# Patient Record
Sex: MALE | Race: WHITE | URBAN - METROPOLITAN AREA
[De-identification: names, ages, dates, MRNs, and addresses within clinical notes are randomized per-mention and may not be internally consistent; named-entity substitution may affect disease eponyms.]

---

## 2018-10-11 ENCOUNTER — EMERGENCY (EMERGENCY)
Facility: HOSPITAL | Age: 83
LOS: 0 days | Discharge: HOME | End: 2018-10-12
Attending: EMERGENCY MEDICINE | Admitting: EMERGENCY MEDICINE
Payer: MEDICARE

## 2018-10-11 VITALS
SYSTOLIC BLOOD PRESSURE: 168 MMHG | DIASTOLIC BLOOD PRESSURE: 69 MMHG | WEIGHT: 149.91 LBS | TEMPERATURE: 98 F | HEIGHT: 70 IN | RESPIRATION RATE: 16 BRPM | HEART RATE: 45 BPM | OXYGEN SATURATION: 96 %

## 2018-10-11 DIAGNOSIS — M41.9 SCOLIOSIS, UNSPECIFIED: ICD-10-CM

## 2018-10-11 DIAGNOSIS — R00.1 BRADYCARDIA, UNSPECIFIED: ICD-10-CM

## 2018-10-11 DIAGNOSIS — S39.82XA OTHER SPECIFIED INJURIES OF LOWER BACK, INITIAL ENCOUNTER: ICD-10-CM
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
## 2018-10-11 DIAGNOSIS — S32.059A UNSPECIFIED FRACTURE OF FIFTH LUMBAR VERTEBRA, INITIAL ENCOUNTER FOR CLOSED FRACTURE: ICD-10-CM

## 2018-10-11 DIAGNOSIS — R05 COUGH: ICD-10-CM
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
## 2018-10-11 LAB
ALBUMIN SERPL ELPH-MCNC: 3.6 G/DL — SIGNIFICANT CHANGE UP (ref 3.3–5)
ALP SERPL-CCNC: 120 U/L — SIGNIFICANT CHANGE UP (ref 40–120)
ALT FLD-CCNC: 32 U/L — SIGNIFICANT CHANGE UP (ref 12–78)
ANION GAP SERPL CALC-SCNC: 7 MMOL/L — SIGNIFICANT CHANGE UP (ref 5–17)
APTT BLD: 28.7 SEC — SIGNIFICANT CHANGE UP (ref 27.5–37.4)
AST SERPL-CCNC: 22 U/L — SIGNIFICANT CHANGE UP (ref 15–37)
BILIRUB SERPL-MCNC: 0.7 MG/DL — SIGNIFICANT CHANGE UP (ref 0.2–1.2)
BUN SERPL-MCNC: 24 MG/DL — HIGH (ref 7–23)
CALCIUM SERPL-MCNC: 9 MG/DL — SIGNIFICANT CHANGE UP (ref 8.5–10.1)
CHLORIDE SERPL-SCNC: 101 MMOL/L — SIGNIFICANT CHANGE UP (ref 96–108)
CO2 SERPL-SCNC: 28 MMOL/L — SIGNIFICANT CHANGE UP (ref 22–31)
CREAT SERPL-MCNC: 0.96 MG/DL — SIGNIFICANT CHANGE UP (ref 0.5–1.3)
GLUCOSE SERPL-MCNC: 89 MG/DL — SIGNIFICANT CHANGE UP (ref 70–99)
HCT VFR BLD CALC: 46.3 % — SIGNIFICANT CHANGE UP (ref 39–50)
HGB BLD-MCNC: 15.4 G/DL — SIGNIFICANT CHANGE UP (ref 13–17)
INR BLD: 0.96 RATIO — SIGNIFICANT CHANGE UP (ref 0.88–1.16)
MAGNESIUM SERPL-MCNC: 2 MG/DL — SIGNIFICANT CHANGE UP (ref 1.6–2.6)
MCHC RBC-ENTMCNC: 31.4 PG — SIGNIFICANT CHANGE UP (ref 27–34)
MCHC RBC-ENTMCNC: 33.3 GM/DL — SIGNIFICANT CHANGE UP (ref 32–36)
MCV RBC AUTO: 94.5 FL — SIGNIFICANT CHANGE UP (ref 80–100)
NRBC # BLD: 0 /100 WBCS — SIGNIFICANT CHANGE UP (ref 0–0)
PLATELET # BLD AUTO: 230 K/UL — SIGNIFICANT CHANGE UP (ref 150–400)
POTASSIUM SERPL-MCNC: 4.7 MMOL/L — SIGNIFICANT CHANGE UP (ref 3.5–5.3)
POTASSIUM SERPL-SCNC: 4.7 MMOL/L — SIGNIFICANT CHANGE UP (ref 3.5–5.3)
PROT SERPL-MCNC: 7.4 GM/DL — SIGNIFICANT CHANGE UP (ref 6–8.3)
PROTHROM AB SERPL-ACNC: 10.4 SEC — SIGNIFICANT CHANGE UP (ref 9.8–12.7)
RBC # BLD: 4.9 M/UL — SIGNIFICANT CHANGE UP (ref 4.2–5.8)
RBC # FLD: 13 % — SIGNIFICANT CHANGE UP (ref 10.3–14.5)
SODIUM SERPL-SCNC: 136 MMOL/L — SIGNIFICANT CHANGE UP (ref 135–145)
TROPONIN I SERPL-MCNC: <0.015 NG/ML — SIGNIFICANT CHANGE UP (ref 0.01–0.04)
WBC # BLD: 5.88 K/UL — SIGNIFICANT CHANGE UP (ref 3.8–10.5)
WBC # FLD AUTO: 5.88 K/UL — SIGNIFICANT CHANGE UP (ref 3.8–10.5)

## 2018-10-11 PROCEDURE — 72125 CT NECK SPINE W/O DYE: CPT | Mod: 26

## 2018-10-11 PROCEDURE — 70450 CT HEAD/BRAIN W/O DYE: CPT | Mod: 26

## 2018-10-11 PROCEDURE — 99285 EMERGENCY DEPT VISIT HI MDM: CPT | Mod: 25

## 2018-10-11 PROCEDURE — 71260 CT THORAX DX C+: CPT | Mod: 26

## 2018-10-11 PROCEDURE — 93010 ELECTROCARDIOGRAM REPORT: CPT

## 2018-10-11 PROCEDURE — 71045 X-RAY EXAM CHEST 1 VIEW: CPT | Mod: 26

## 2018-10-11 PROCEDURE — 74177 CT ABD & PELVIS W/CONTRAST: CPT | Mod: 26

## 2018-10-11 NOTE — ED PROVIDER NOTE - MEDICAL DECISION MAKING DETAILS
82 y/o male with a PMHx of scoliosis, central tremor, short term memory problems, chronic cough presents to the ED s/p unwitnessed mechanical fall today now c/o new right lower back pain that radiates to groin. Workup for traumatic injury.

## 2018-10-11 NOTE — ED PROVIDER NOTE - NS_ ATTENDINGSCRIBEDETAILS _ED_A_ED_FT
The scribe's documentation has been prepared under my direction and personally reviewed by me in its entirety.  I confirm that the note above accurately reflects all my work, treatment, procedures, and decision making except where otherwise noted or amended by me.  Francisco Kilgore M.D.

## 2018-10-11 NOTE — ED ADULT TRIAGE NOTE - CHIEF COMPLAINT QUOTE
Pt fell, c/o back pain, denies head injury. Pt also c/o palpitations earlier today, EMS states pt has bradycardia in 40s.

## 2018-10-11 NOTE — ED ADULT NURSE NOTE - NSIMPLEMENTINTERV_GEN_ALL_ED
Implemented All Fall Risk Interventions:  [place] to call system. Call bell, personal items and telephone within reach. Instruct patient to call for assistance. Room bathroom lighting operational. Non-slip footwear when patient is off stretcher. Physically safe environment: no spills, clutter or unnecessary equipment. Stretcher in lowest position, wheels locked, appropriate side rails in place. Provide visual cue, wrist band, yellow gown, etc. Monitor gait and stability. Monitor for mental status changes and reorient to person, place, and time. Review medications for side effects contributing to fall risk. Reinforce activity limits and safety measures with patient and family.

## 2018-10-11 NOTE — ED PROVIDER NOTE - OBJECTIVE STATEMENT
82 y/o male with a PMHx of scoliosis, central tremor, short term memory problems, chronic cough presents to the ED s/p unwitnessed mechanical fall today now c/o new right lower back pain that radiates to groin. Pt slipped backwards while standing up from EZ chair. Pt's wife called EMS because pt expressed back pain that was not in its usual spot. Denies head injury or LOC. Pt states that earlier today he was experiencing palpitations, upon arrival to ED EMS states that pt is bradycardic. Pt has been taking prednisone for back pain. MRI done yesterday. Denies CP, SOB. On beta-blockers. Home meds- Topamax, propanolol, Mysoline, Bacid, pravastatin, Flomax, Xalatan.

## 2019-07-30 NOTE — ED ADULT NURSE NOTE - CAS ELECT INFOMATION PROVIDED
Rasagiline (By mouth)   Rasagiline ([id number]-lino)  Treats Parkinson disease. This medicine is an MAOI. Brand Name(s): Azilect   There may be other brand names for this medicine. When This Medicine Should Not Be Used: This medicine is not right for everyone. Do not use it if you had an allergic reaction to rasagiline. How to Use This Medicine:   Tablet  · Your doctor will tell you how much medicine to use. Do not use more than directed. · Missed dose: Skip the missed dose and go back to your regular dosing schedule. Do not double doses. · Store the medicine in a closed container at room temperature, away from heat, moisture, and direct light. Drugs and Foods to Avoid:   Ask your doctor or pharmacist before using any other medicine, including over-the-counter medicines, vitamins, and herbal products. · You must avoid many other medicines while you are using rasagiline. These medicines used together could cause serious health problems, including death. Ask your doctor before you use any other medicine. You may need to wait 1 to 5 weeks before you can use the other medicine. · Do not use rasagiline if you have used an MAOI within the past 14 days. Do not use this medicine if you are using Garret's wort, cyclobenzaprine, dextromethorphan, meperidine, methadone, propoxyphene, or tramadol. · Some other medicines that can interact with rasagiline include ciprofloxacin, metoclopramide, cough and cold medicines (such as dextromethorphan), medicine to treat depression or mental health issues (such as a TCA, SSRI, or SSNRI). · Avoid foods and drinks that are high in tyramine, because your blood pressure could get dangerously high. Your doctor should give you a complete list. In general, do not eat anything aged or fermented, such as most cheese, most alcohol, cured meat (such as salami), sauerkraut, and soy sauce. Check the expiration dates on packages.  Tyramine levels get higher as food gets older or if it has not been refrigerated properly. · Tell your doctor if you use anything else that makes you sleepy. Some examples are allergy medicine, narcotic pain medicine, and alcohol. Warnings While Using This Medicine:   · Tell your doctor if you are pregnant or breastfeeding, or if you have kidney disease, liver disease, high or low blood pressure, muscle problems, or a history of mental illness. · This medicine may cause the following problems:  ¨ Serotonin syndrome (when used with antidepressant medicine)  ¨ Low or high blood pressure  ¨ Unusual changes in mood or behavior, compulsive behavior, hallucinations  ¨ Possible increased risk of skin cancer  · This medicine may make you dizzy or drowsy. It may even cause you to fall asleep without warning. Do not drive or do anything that could be dangerous until you know how this medicine affects you. Stand up slowly if you feel dizzy or lightheaded. · Do not stop using this medicine suddenly. Your doctor will need to slowly decrease your dose before you stop it completely. · Your doctor will check your progress and the effects of this medicine at regular visits. Keep all appointments. · Keep all medicine out of the reach of children. Never share your medicine with anyone.   Possible Side Effects While Using This Medicine:   Call your doctor right away if you notice any of these side effects:  · Allergic reaction: Itching or hives, swelling in your face or hands, swelling or tingling in your mouth or throat, chest tightness, trouble breathing  · Anxiety, restlessness, fast heartbeat, fever, sweating, muscle spasms, twitching, nausea, vomiting, diarrhea, seeing or hearing things that are not there  · Extreme sleepiness or drowsiness  · Confusion, unusual changes in mood or behavior, behaviors you cannot control  · Lightheadedness, dizziness, or fainting  · Skin changes or growths  · Twitching or muscle movements you cannot control, tremors, problems with balance or walking  If you notice these less serious side effects, talk with your doctor:   · Joint or muscle pain  · Mild nausea or vomiting, constipation, upset stomach  · Weight loss  If you notice other side effects that you think are caused by this medicine, tell your doctor. Call your doctor for medical advice about side effects. You may report side effects to FDA at 7-220-GEB-8584  © 2017 [facility] Information is for End User's use only and may not be sold, redistributed or otherwise used for commercial purposes. The above information is an  only. It is not intended as medical advice for individual conditions or treatments. Talk to your doctor, nurse or pharmacist before following any medical regimen to see if it is safe and effective for you. DC instructions

## 2021-01-01 NOTE — ED ADULT NURSE NOTE - OBJECTIVE STATEMENT
Patient presents to ED following fall. Patient states he fell backwards, denies hitting head and LOC. Patient states he has been experiencing back pain related to scoliosis. Patient states since fall he has increase in pain on lower right back. Patient was recently prescribed prednisone but MD DCed today after patient was experiencing palpitations. Patient states he has experienced increased weakness and fatigue with back pain. Patient denies SOB, CP, fever, chills, NVD. Patient had MRI yesterday. Patient A&0x3, wife at bedside. Blood Typing (ABO + Rho D + Direct Jenna), Cord Blood (11.17.21 @ 05:58)    Rh Interpretation: Positive    Direct Jenna IgG: Positive    ABO Interpretation: B

## 2021-08-22 ENCOUNTER — TRANSCRIPTION ENCOUNTER (OUTPATIENT)
Age: 86
End: 2021-08-22

## 2021-08-22 ENCOUNTER — INPATIENT (INPATIENT)
Facility: HOSPITAL | Age: 86
LOS: 4 days | Discharge: NURSING, REHAB OR LONG-TERM CARE | DRG: 480 | End: 2021-08-27
Attending: FAMILY MEDICINE | Admitting: STUDENT IN AN ORGANIZED HEALTH CARE EDUCATION/TRAINING PROGRAM
Payer: MEDICARE

## 2021-08-22 VITALS
OXYGEN SATURATION: 91 % | SYSTOLIC BLOOD PRESSURE: 159 MMHG | TEMPERATURE: 99 F | RESPIRATION RATE: 18 BRPM | HEART RATE: 103 BPM | WEIGHT: 164.91 LBS | HEIGHT: 70 IN | DIASTOLIC BLOOD PRESSURE: 69 MMHG
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
## 2021-08-22 DIAGNOSIS — S72.002A FRACTURE OF UNSPECIFIED PART OF NECK OF LEFT FEMUR, INITIAL ENCOUNTER FOR CLOSED FRACTURE: ICD-10-CM

## 2021-08-22 DIAGNOSIS — E78.5 HYPERLIPIDEMIA, UNSPECIFIED: ICD-10-CM

## 2021-08-22 DIAGNOSIS — J44.9 CHRONIC OBSTRUCTIVE PULMONARY DISEASE, UNSPECIFIED: ICD-10-CM

## 2021-08-22 DIAGNOSIS — S72.009A FRACTURE OF UNSPECIFIED PART OF NECK OF UNSPECIFIED FEMUR, INITIAL ENCOUNTER FOR CLOSED FRACTURE: ICD-10-CM

## 2021-08-22 DIAGNOSIS — F03.90 UNSPECIFIED DEMENTIA WITHOUT BEHAVIORAL DISTURBANCE: ICD-10-CM

## 2021-08-22 DIAGNOSIS — Z90.49 ACQUIRED ABSENCE OF OTHER SPECIFIED PARTS OF DIGESTIVE TRACT: Chronic | ICD-10-CM

## 2021-08-22 DIAGNOSIS — K76.89 OTHER SPECIFIED DISEASES OF LIVER: ICD-10-CM

## 2021-08-22 DIAGNOSIS — Z29.9 ENCOUNTER FOR PROPHYLACTIC MEASURES, UNSPECIFIED: ICD-10-CM

## 2021-08-22 DIAGNOSIS — G25.0 ESSENTIAL TREMOR: ICD-10-CM

## 2021-08-22 DIAGNOSIS — R00.1 BRADYCARDIA, UNSPECIFIED: ICD-10-CM

## 2021-08-22 LAB
ALBUMIN SERPL ELPH-MCNC: 2.9 G/DL — LOW (ref 3.3–5)
ALP SERPL-CCNC: 132 U/L — HIGH (ref 40–120)
ALT FLD-CCNC: 26 U/L — SIGNIFICANT CHANGE UP (ref 12–78)
ANION GAP SERPL CALC-SCNC: 3 MMOL/L — LOW (ref 5–17)
APPEARANCE UR: ABNORMAL
APTT BLD: 31.7 SEC — SIGNIFICANT CHANGE UP (ref 27.5–35.5)
AST SERPL-CCNC: 18 U/L — SIGNIFICANT CHANGE UP (ref 15–37)
BACTERIA # UR AUTO: ABNORMAL
BASOPHILS # BLD AUTO: 0.03 K/UL — SIGNIFICANT CHANGE UP (ref 0–0.2)
BASOPHILS NFR BLD AUTO: 0.4 % — SIGNIFICANT CHANGE UP (ref 0–2)
BILIRUB SERPL-MCNC: 0.5 MG/DL — SIGNIFICANT CHANGE UP (ref 0.2–1.2)
BILIRUB UR-MCNC: NEGATIVE — SIGNIFICANT CHANGE UP
BLD GP AB SCN SERPL QL: SIGNIFICANT CHANGE UP
BUN SERPL-MCNC: 26 MG/DL — HIGH (ref 7–23)
CALCIUM SERPL-MCNC: 8.4 MG/DL — LOW (ref 8.5–10.1)
CHLORIDE SERPL-SCNC: 110 MMOL/L — HIGH (ref 96–108)
CK SERPL-CCNC: 46 U/L — SIGNIFICANT CHANGE UP (ref 26–308)
CO2 SERPL-SCNC: 27 MMOL/L — SIGNIFICANT CHANGE UP (ref 22–31)
COLOR SPEC: YELLOW — SIGNIFICANT CHANGE UP
COMMENT - URINE: SIGNIFICANT CHANGE UP
COMMENT - URINE: SIGNIFICANT CHANGE UP
CREAT SERPL-MCNC: 0.85 MG/DL — SIGNIFICANT CHANGE UP (ref 0.5–1.3)
DIFF PNL FLD: ABNORMAL
EOSINOPHIL # BLD AUTO: 0.32 K/UL — SIGNIFICANT CHANGE UP (ref 0–0.5)
EOSINOPHIL NFR BLD AUTO: 4.7 % — SIGNIFICANT CHANGE UP (ref 0–6)
EPI CELLS # UR: SIGNIFICANT CHANGE UP
GLUCOSE SERPL-MCNC: 87 MG/DL — SIGNIFICANT CHANGE UP (ref 70–99)
GLUCOSE UR QL: NEGATIVE — SIGNIFICANT CHANGE UP
HCT VFR BLD CALC: 44.8 % — SIGNIFICANT CHANGE UP (ref 39–50)
HGB BLD-MCNC: 14.6 G/DL — SIGNIFICANT CHANGE UP (ref 13–17)
IMM GRANULOCYTES NFR BLD AUTO: 0.9 % — SIGNIFICANT CHANGE UP (ref 0–1.5)
INR BLD: 1.05 RATIO — SIGNIFICANT CHANGE UP (ref 0.88–1.16)
KETONES UR-MCNC: ABNORMAL
LEUKOCYTE ESTERASE UR-ACNC: ABNORMAL
LYMPHOCYTES # BLD AUTO: 1.22 K/UL — SIGNIFICANT CHANGE UP (ref 1–3.3)
LYMPHOCYTES # BLD AUTO: 18.1 % — SIGNIFICANT CHANGE UP (ref 13–44)
MCHC RBC-ENTMCNC: 30.2 PG — SIGNIFICANT CHANGE UP (ref 27–34)
MCHC RBC-ENTMCNC: 32.6 GM/DL — SIGNIFICANT CHANGE UP (ref 32–36)
MCV RBC AUTO: 92.8 FL — SIGNIFICANT CHANGE UP (ref 80–100)
MONOCYTES # BLD AUTO: 0.93 K/UL — HIGH (ref 0–0.9)
MONOCYTES NFR BLD AUTO: 13.8 % — SIGNIFICANT CHANGE UP (ref 2–14)
NEUTROPHILS # BLD AUTO: 4.19 K/UL — SIGNIFICANT CHANGE UP (ref 1.8–7.4)
NEUTROPHILS NFR BLD AUTO: 62.1 % — SIGNIFICANT CHANGE UP (ref 43–77)
NITRITE UR-MCNC: POSITIVE
NRBC # BLD: 0 /100 WBCS — SIGNIFICANT CHANGE UP (ref 0–0)
PH UR: 6.5 — SIGNIFICANT CHANGE UP (ref 5–8)
PLATELET # BLD AUTO: 233 K/UL — SIGNIFICANT CHANGE UP (ref 150–400)
POTASSIUM SERPL-MCNC: 4.7 MMOL/L — SIGNIFICANT CHANGE UP (ref 3.5–5.3)
POTASSIUM SERPL-SCNC: 4.7 MMOL/L — SIGNIFICANT CHANGE UP (ref 3.5–5.3)
PROT SERPL-MCNC: 7.2 G/DL — SIGNIFICANT CHANGE UP (ref 6–8.3)
PROT UR-MCNC: 30 MG/DL
PROTHROM AB SERPL-ACNC: 12.3 SEC — SIGNIFICANT CHANGE UP (ref 10.6–13.6)
RBC # BLD: 4.83 M/UL — SIGNIFICANT CHANGE UP (ref 4.2–5.8)
RBC # FLD: 13.2 % — SIGNIFICANT CHANGE UP (ref 10.3–14.5)
RBC CASTS # UR COMP ASSIST: ABNORMAL /HPF (ref 0–4)
SODIUM SERPL-SCNC: 140 MMOL/L — SIGNIFICANT CHANGE UP (ref 135–145)
SP GR SPEC: 1.01 — SIGNIFICANT CHANGE UP (ref 1.01–1.02)
UROBILINOGEN FLD QL: NEGATIVE — SIGNIFICANT CHANGE UP
WBC # BLD: 6.75 K/UL — SIGNIFICANT CHANGE UP (ref 3.8–10.5)
WBC # FLD AUTO: 6.75 K/UL — SIGNIFICANT CHANGE UP (ref 3.8–10.5)
WBC UR QL: ABNORMAL

## 2021-08-22 PROCEDURE — 70450 CT HEAD/BRAIN W/O DYE: CPT | Mod: 26,MH

## 2021-08-22 PROCEDURE — 73502 X-RAY EXAM HIP UNI 2-3 VIEWS: CPT | Mod: 26,LT

## 2021-08-22 PROCEDURE — 93010 ELECTROCARDIOGRAM REPORT: CPT

## 2021-08-22 PROCEDURE — 99223 1ST HOSP IP/OBS HIGH 75: CPT | Mod: GC

## 2021-08-22 PROCEDURE — 71045 X-RAY EXAM CHEST 1 VIEW: CPT | Mod: 26

## 2021-08-22 PROCEDURE — 71260 CT THORAX DX C+: CPT | Mod: 26,ME

## 2021-08-22 PROCEDURE — 99285 EMERGENCY DEPT VISIT HI MDM: CPT

## 2021-08-22 PROCEDURE — 72125 CT NECK SPINE W/O DYE: CPT | Mod: 26,MH

## 2021-08-22 PROCEDURE — G1004: CPT

## 2021-08-22 PROCEDURE — 73552 X-RAY EXAM OF FEMUR 2/>: CPT | Mod: 26,LT

## 2021-08-22 PROCEDURE — 74177 CT ABD & PELVIS W/CONTRAST: CPT | Mod: 26,ME

## 2021-08-22 RX ORDER — CEFTRIAXONE 500 MG/1
1000 INJECTION, POWDER, FOR SOLUTION INTRAMUSCULAR; INTRAVENOUS ONCE
Refills: 0 | Status: COMPLETED | OUTPATIENT
Start: 2021-08-22 | End: 2021-08-22

## 2021-08-22 RX ORDER — ONDANSETRON 8 MG/1
4 TABLET, FILM COATED ORAL ONCE
Refills: 0 | Status: COMPLETED | OUTPATIENT
Start: 2021-08-22 | End: 2021-08-22

## 2021-08-22 RX ORDER — MORPHINE SULFATE 50 MG/1
2 CAPSULE, EXTENDED RELEASE ORAL ONCE
Refills: 0 | Status: DISCONTINUED | OUTPATIENT
Start: 2021-08-22 | End: 2021-08-22

## 2021-08-22 RX ORDER — LATANOPROST 0.05 MG/ML
1 SOLUTION/ DROPS OPHTHALMIC; TOPICAL AT BEDTIME
Refills: 0 | Status: DISCONTINUED | OUTPATIENT
Start: 2021-08-22 | End: 2021-08-23

## 2021-08-22 RX ORDER — CEFTRIAXONE 500 MG/1
1000 INJECTION, POWDER, FOR SOLUTION INTRAMUSCULAR; INTRAVENOUS EVERY 24 HOURS
Refills: 0 | Status: DISCONTINUED | OUTPATIENT
Start: 2021-08-23 | End: 2021-08-23

## 2021-08-22 RX ORDER — DONEPEZIL HYDROCHLORIDE 10 MG/1
5 TABLET, FILM COATED ORAL AT BEDTIME
Refills: 0 | Status: DISCONTINUED | OUTPATIENT
Start: 2021-08-22 | End: 2021-08-23

## 2021-08-22 RX ORDER — LACTOBACILLUS ACIDOPHILUS 100MM CELL
1 CAPSULE ORAL DAILY
Refills: 0 | Status: DISCONTINUED | OUTPATIENT
Start: 2021-08-22 | End: 2021-08-23

## 2021-08-22 RX ORDER — CEFTRIAXONE 500 MG/1
INJECTION, POWDER, FOR SOLUTION INTRAMUSCULAR; INTRAVENOUS
Refills: 0 | Status: DISCONTINUED | OUTPATIENT
Start: 2021-08-22 | End: 2021-08-23

## 2021-08-22 RX ORDER — TOPIRAMATE 25 MG
25 TABLET ORAL
Refills: 0 | Status: DISCONTINUED | OUTPATIENT
Start: 2021-08-22 | End: 2021-08-23

## 2021-08-22 RX ORDER — PRIMIDONE 250 MG/1
50 TABLET ORAL
Refills: 0 | Status: DISCONTINUED | OUTPATIENT
Start: 2021-08-22 | End: 2021-08-23

## 2021-08-22 RX ORDER — TIMOLOL 0.5 %
1 DROPS OPHTHALMIC (EYE) DAILY
Refills: 0 | Status: DISCONTINUED | OUTPATIENT
Start: 2021-08-22 | End: 2021-08-23

## 2021-08-22 RX ORDER — TAMSULOSIN HYDROCHLORIDE 0.4 MG/1
0.4 CAPSULE ORAL AT BEDTIME
Refills: 0 | Status: DISCONTINUED | OUTPATIENT
Start: 2021-08-22 | End: 2021-08-23

## 2021-08-22 RX ORDER — SODIUM CHLORIDE 9 MG/ML
1000 INJECTION INTRAMUSCULAR; INTRAVENOUS; SUBCUTANEOUS ONCE
Refills: 0 | Status: COMPLETED | OUTPATIENT
Start: 2021-08-22 | End: 2021-08-22

## 2021-08-22 RX ADMIN — ONDANSETRON 4 MILLIGRAM(S): 8 TABLET, FILM COATED ORAL at 19:55

## 2021-08-22 RX ADMIN — SODIUM CHLORIDE 1000 MILLILITER(S): 9 INJECTION INTRAMUSCULAR; INTRAVENOUS; SUBCUTANEOUS at 19:57

## 2021-08-22 RX ADMIN — SODIUM CHLORIDE 1000 MILLILITER(S): 9 INJECTION INTRAMUSCULAR; INTRAVENOUS; SUBCUTANEOUS at 22:02

## 2021-08-22 RX ADMIN — MORPHINE SULFATE 2 MILLIGRAM(S): 50 CAPSULE, EXTENDED RELEASE ORAL at 19:56

## 2021-08-22 RX ADMIN — MORPHINE SULFATE 2 MILLIGRAM(S): 50 CAPSULE, EXTENDED RELEASE ORAL at 22:00

## 2021-08-22 RX ADMIN — MORPHINE SULFATE 2 MILLIGRAM(S): 50 CAPSULE, EXTENDED RELEASE ORAL at 22:02

## 2021-08-22 NOTE — H&P ADULT - NSHPPHYSICALEXAM_GEN_ALL_CORE
Vital Signs Last 24 Hrs  T(C): 36.5 (22 Aug 2021 23:11), Max: 37.5 (22 Aug 2021 20:11)  T(F): 97.7 (22 Aug 2021 23:11), Max: 99.5 (22 Aug 2021 20:11)  HR: 48 (22 Aug 2021 23:11) (44 - 103)  BP: 169/73 (22 Aug 2021 23:11) (155/71 - 169/73)  BP(mean): --  RR: 18 (22 Aug 2021 23:11) (18 - 18)  SpO2: 94% (22 Aug 2021 23:11) (91% - 98%)    Physical Exam:  General: pleasant, in pain with movement   HEENT: Normocephallic Atraumatic, PERRLA, EOMI bl, moist mucous membranes   Neck: Supple, nontender, no cervical lymphadenopathy  Neurology: A&Ox2, confused in the setting of dementia, no focal neurological deficits: CNII-XII intact  Respiratory: Clear To Auscultation B/L, No Wheezes, rhonchi or rales  CV: Regular Rate and Rhythm, +S1/S2, no murmurs, rubs or gallops  Abdominal: Soft, Non-Tender, Non-Distended +Bowel Soundsx4  MSK: left hip and left leg TTP, decreased ROM in left hip flexion and extension, left ribs 6-9 TTP   Extremities: No Clubbing, cyanosis or edema, + peripheral pulses: cap refill <2 secs LE bilaterally  Skin: no visible eccymosis of left hip/leg/ribs T(C): 36.5 (22 Aug 2021 23:11), Max: 37.5 (22 Aug 2021 20:11)  T(F): 97.7 (22 Aug 2021 23:11), Max: 99.5 (22 Aug 2021 20:11)  HR: 48 (22 Aug 2021 23:11) (44 - 103)  BP: 169/73 (22 Aug 2021 23:11) (155/71 - 169/73)  RR: 18 (22 Aug 2021 23:11) (18 - 18)  SpO2: 94% (22 Aug 2021 23:11) (91% - 98%)    General: pleasant, in pain with movement   HEENT: Normocephalic Atraumatic, PERRLA, EOMI bl, moist mucous membranes   Neck: Supple, nontender, no cervical lymphadenopathy  Neurology: A&Ox2, confused in the setting of dementia, no focal neurological deficits: CNII-XII intact  Respiratory: coarse, decreased breath sounds bilaterally  CV: Regular Rate and Rhythm, +S1/S2, no murmurs, rubs or gallops  Abdominal: Soft, Non-Tender, Non-Distended +Bowel Soundsx4  MSK: left hip and left leg TTP, decreased ROM in left hip flexion and extension, left ribs 6-9 TTP   Extremities: No Clubbing, cyanosis or edema, + peripheral pulses: cap refill <2 secs LE bilaterally  Skin: no visible ecchymosis of left hip/leg/ribs

## 2021-08-22 NOTE — H&P ADULT - ATTENDING COMMENTS
87 y/o M PMHx of dementia, essential tremor, COPD not on home O2, severe scoliosis, HLD, hx of partial colon resection, admit for comminuted intertrochanteric fracture of the proximal left femur s/p suspected mechanical fall.    admit to medicine  plan for operative fixation in AM w/ Dr. Henriquez  on rocephin for UTI, follow up culture data  cardio Dr. Mathis called for cardiac optimization; f/u 2D ECHO  pulm Dr. Bolton called for hx of COPD and likely chronic abnormal lung sounds  CT with mucoid impaction w/ likely chronic aspiration - coarse BS on exam  No absolute medical contraindication to proceeding w/ surgery with routine monitoring.  DR. GARCIA to assume care 8/23/21    Agree with H&P as outlined above, edited where appropriate.

## 2021-08-22 NOTE — H&P ADULT - NSICDXPASTMEDICALHX_GEN_ALL_CORE_FT
PAST MEDICAL HISTORY:  Chronic cough     COPD, severe     HLD (hyperlipidemia)     Memory loss dementia    Occasional tremors severe essential tremors    Osteoporosis     Scoliosis

## 2021-08-22 NOTE — H&P ADULT - NSHPREVIEWOFSYSTEMS_GEN_ALL_CORE
CONSTITUTIONAL: denies fever, chills, fatigue, weakness  HEENT: denies blurred visions, sore throat  SKIN: denies new lesions, rash  CARDIOVASCULAR: denies chest pain, chest pressure, palpitations  RESPIRATORY: denies shortness of breath, sputum production  GASTROINTESTINAL: denies nausea, vomiting, diarrhea, abdominal pain  GENITOURINARY: denies dysuria, discharge  NEUROLOGICAL: denies numbness, headache, focal weakness  MUSCULOSKELETAL: admits to left leg and hip pain  HEMATOLOGIC: denies gross bleeding, bruising  LYMPHATICS: denies enlarged lymph nodes, extremity swelling unable to obtain ROS due to mental status  admits to left leg and hip pain, denies all other complaints

## 2021-08-22 NOTE — ED ADULT NURSE NOTE - NSIMPLEMENTINTERV_GEN_ALL_ED
Implemented All Fall with Harm Risk Interventions:  [place] to call system. Call bell, personal items and telephone within reach. Instruct patient to call for assistance. Room bathroom lighting operational. Non-slip footwear when patient is off stretcher. Physically safe environment: no spills, clutter or unnecessary equipment. Stretcher in lowest position, wheels locked, appropriate side rails in place. Provide visual cue, wrist band, yellow gown, etc. Monitor gait and stability. Monitor for mental status changes and reorient to person, place, and time. Review medications for side effects contributing to fall risk. Reinforce activity limits and safety measures with patient and family. Provide visual clues: red socks.

## 2021-08-22 NOTE — H&P ADULT - ASSESSMENT
86 M PMHX dementia, essential tremor, COPD not on home O2, severe scoliosis, HLD, hx of partial colon resection, admit for mechanical fall, r/o cardiac etiology.  85 y/o M PMHx of dementia, essential tremor, COPD not on home O2, severe scoliosis, HLD, hx of partial colon resection, admit for comminuted intertrochanteric fracture of the proximal left femur s/p suspected mechanical fall.

## 2021-08-22 NOTE — ED ADULT NURSE NOTE - OBJECTIVE STATEMENT
patient came in ED from home, BIBA with c/o left hip / groin pain s/p unwitnessed fall. patient stated he was in his room, normally walks with a cane lost his balance and fell. denies head injury. denies LOC. as per patient spouse called 911. alert and oriented X 4. non-labored respiration noted. + moist sounding cough. abdomen nondistended, nontender. left lower leg noted bent and internally rotated. +bruise bilateral knee. +1 pitting edema bilateral feet.

## 2021-08-22 NOTE — ED PROVIDER NOTE - CARE PLAN
Principal Discharge DX:	Hip fracture, left, closed, initial encounter  Secondary Diagnosis:	Rib contusion, left, initial encounter  Secondary Diagnosis:	Fall, initial encounter   1

## 2021-08-22 NOTE — ED ADULT NURSE NOTE - NSICDXPASTMEDICALHX_GEN_ALL_CORE_FT
PAST MEDICAL HISTORY:  Chronic cough     COPD, mild     Memory loss     Occasional tremors     Osteoporosis     Scoliosis

## 2021-08-22 NOTE — H&P ADULT - PROBLEM SELECTOR PLAN 5
-continue home pravastatin -hx of severe essential tremor  -continue home topamax, Primidone,   -hold home propanolol given sinus shanti and syncope with possible cardiac etiology

## 2021-08-22 NOTE — ED PROVIDER NOTE - OBJECTIVE STATEMENT
87 yo M p/w  mechanical fall today at home, landed on L side. Pt co L rib pain and L hip pain and deformity. pt unable to stand after fall. NO head inj / loc. No HA/n/v/dizzy. no neck / back pain. No SOB. pt states inc pain with deep insp.  NO recent illness. Pt fully vaccinated for covid. no agg/allev factors. No other inj or co. No other inj or co.

## 2021-08-22 NOTE — H&P ADULT - PROBLEM SELECTOR PLAN 1
-fall suspect in setting of sinus bradycardia cardiac etiology vs mechanical, comminuted intertrochanteric fracture of the proximal left femur  -CT abd and pelvis: Comminuted intertrochanteric fracture of the proximal left femur  -bradycardia in setting of propanolol for essential tremors, hold home propanolol   -monitor on tele   -NPO for OR tomorrow   -check orthostatics  -echo, carotid dopplers  -lipid panel, TSH, A1c  -fall precautions   -PT   -Dr. Henriquez consulted, plan for OR tomorrow  -consult cardio Dr. Mercado's group comminuted intertrochanteric fracture of the proximal left femur  -s/p suspected mechanical fall although pt is a poor historian and is bradycardic  -CT abd and pelvis: Comminuted intertrochanteric fracture of the proximal left femur  -bradycardia likely worsened by propanolol for essential tremors, hold home propanolol   -monitor on remote telemetry  -NPO for OR tomorrow   -fall precautions   -Dr. Henriquez consulted, plan for OR tomorrow  -Cardio consult Dr. Mercado's group for cardiac optimization, no absolute medical contraindications

## 2021-08-22 NOTE — H&P ADULT - PROBLEM SELECTOR PLAN 2
-sinus shanti   -suspect sinus shanti in stenting of propanolol use for essential tremors   -EKG rate 48 bpm, no ischemic changes  -consult cardio -sinus shanti   -suspect sinus shanti in setting of propanolol use for essential tremors   -EKG rate 48 bpm, no ischemic changes  -check 2D ECHO  -consult cardio

## 2021-08-22 NOTE — H&P ADULT - HISTORY OF PRESENT ILLNESS
86 M PMHX dementia, essential tremor, COPD not on home O2, severe scoliosis, here for mechanical fall at 18:30. Pt was walking from the bathroom to the kitchen and tripped. Occurred on carpeted floor, using his cane, landed on left side. No head trauma, no LOC. Pt denies palpitations, CP, SOB, dizziness, lightheadedness prior to fall. As per wife, pt has been his usual self. Pt has slow heart rate at rest, on propanolol for essential tremor.     Ed vitals afebrile, , bp 159/69, RR 18, 91% on RA.  Labs significant for alk p 132.   CT abdomen CT chest: Comminuted intertrochanteric fracture of the proximal left femur. Mucoid impacted airways with chronic aspiration in the right lower lobe. Chronic stable similar to prior imaging: simple hepatic cysts largest measuring 5.7 x 5.2 cm in size. Liver hemangioma 2.3 x 1.9 cm. 6 mm nonobstructing calculus in the lower pole of the left kidney and midpole of the left kidney. Diffuse bladder wall thickening of the of the urinary bladder most prominent at the posterior wall, and numerous bladder diverticulum again demonstrated. Small calcification at the posterior left bladder wall again demonstrated.  EKG sinus shanti rate 48 bpm.  86 M PMHX dementia, essential tremor, COPD not on home O2, severe scoliosis, HLD, hx of partial colon resection, here for mechanical fall at 18:30. Pt was walking from the bathroom to the kitchen and tripped. Occurred on carpeted floor, using his cane, landed on left side. No head trauma, no LOC. Pt denies palpitations, CP, SOB, dizziness, lightheadedness prior to fall. As per wife, pt has been his usual self. Pt has slow heart rate at rest, on propanolol for essential tremor.     Ed vitals afebrile, , bp 159/69, RR 18, 91% on RA. Labs significant for alk p 132.   CT abdomen CT chest: Comminuted intertrochanteric fracture of the proximal left femur. Mucoid impacted airways with chronic aspiration in the right lower lobe. Chronic stable similar to prior imaging: simple hepatic cysts largest measuring 5.7 x 5.2 cm in size. Liver hemangioma 2.3 x 1.9 cm. 6 mm nonobstructing calculus in the lower pole of the left kidney and midpole of the left kidney. Diffuse bladder wall thickening of the of the urinary bladder most prominent at the posterior wall, and numerous bladder diverticulum again demonstrated. Small calcification at the posterior left bladder wall again demonstrated.  EKG sinus shanti rate 48 bpm, no ischemic changes.  86 M PMHX dementia, essential tremor, COPD not on home O2, severe scoliosis, HLD, hx of partial colon resection, here for mechanical fall at 18:30. Pt was walking from the bathroom to the kitchen and tripped. Occurred on carpeted floor, using his cane, landed on left side. No head trauma, no LOC. Pt denies palpitations, CP, SOB, dizziness, lightheadedness prior to fall. As per wife, pt has been his usual self. Pt has slow heart rate at rest, on propanolol for essential tremor. Patient is a poor historian and history is limited.    In the ED,  VS: Tmax 99.5 , bp 159/69, RR 18, 91% on RA. Labs significant for alk p 132.   CT C/A/P: Comminuted intertrochanteric fracture of the proximal left femur. Mucoid impacted airways with chronic aspiration in the right lower lobe. Chronic stable similar to prior imaging: simple hepatic cysts largest measuring 5.7 x 5.2 cm in size. Liver hemangioma 2.3 x 1.9 cm. 6 mm non-obstructing calculus in the lower pole of the left kidney and midpole of the left kidney. Diffuse bladder wall thickening of the of the urinary bladder most prominent at the posterior wall, and numerous bladder diverticulum again demonstrated. Small calcification at the posterior left bladder wall again demonstrated.  EKG sinus shanti rate 48 bpm, no ischemic changes.

## 2021-08-22 NOTE — H&P ADULT - PROBLEM SELECTOR PLAN 4
-hx of severe essential tremor  -continue home topamax, Primidone,   -hold home propanolol given sinus shanti and syncope with possible cardiac etiology -hx of dementia  -as per wife pt may have sundowning   -will get speech and swallow day after surgery to assess for diet reccs   -continue home Aricept -hx of dementia  -as per wife pt may have sundowning   -will get speech and swallow day after surgery to assess for diet recs  -continue home Aricept

## 2021-08-22 NOTE — H&P ADULT - PROBLEM SELECTOR PLAN 7
-multiple chronic findings on imaging   -Chronic stable similar to prior imaging: simple hepatic cysts largest measuring 5.7 x 5.2 cm in size.   -Liver hemangioma 2.3 x 1.9 cm.   -6 mm nonobstructing calculus in the lower pole of the left kidney and midpole of the left kidney.   -Diffuse bladder wall thickening of the of the urinary bladder most prominent at the posterior wall, and numerous bladder diverticulum again demonstrated.   -Small calcification at the posterior left bladder wall again demonstrated.  -f/u outpatient -severe COPD   -former smoker 50 years x 1-2 ppd  -CT chest mucoid impacted airways with chronic aspiration in the right lower lobe  -monitor -COPD not on home O2  -former smoker 50 years x 1-2 ppd  -CT chest mucoid impacted airways with chronic aspiration in the right lower lobe  -may benefit from pulm VENUS Bolton

## 2021-08-22 NOTE — H&P ADULT - NSHPSOCIALHISTORY_GEN_ALL_CORE
former smoker 50 years 1-2 ppd, quit 20 years ago  no etoh or other drug use   lives at home with wife, performs ADLs  dementia wife helps with his meds  full code  COVID vaccines 2 of 2: moderna

## 2021-08-22 NOTE — ED PROVIDER NOTE - ENMT, MLM
Airway patent, Nasal mucosa clear. Mouth with normal mucosa. Throat has no vesicles, no oropharyngeal exudates and uvula is midline. MM Moist. neck supple. no meningeal signs. no ext signs of head trauma.

## 2021-08-22 NOTE — ED PROVIDER NOTE - RESPIRATORY, MLM
Breath sounds clear and equal bilaterally. nl resp effort. no w/r/r. No acc muscle use. Mild tend to Lat lower ant ax riibs. No crepitus. no obj deformity.

## 2021-08-22 NOTE — H&P ADULT - NSICDXFAMILYHX_GEN_ALL_CORE_FT
FAMILY HISTORY:  Father  Still living? No  FH: colon cancer, Age at diagnosis: Age Unknown    Mother  Still living? No  FHx: dementia, Age at diagnosis: Age Unknown    Sibling  Still living? No  FH: Parkinson's disease, Age at diagnosis: Age Unknown  FH: stroke, Age at diagnosis: Age Unknown

## 2021-08-22 NOTE — H&P ADULT - PROBLEM SELECTOR PLAN 3
-hx of dementia  -as per wife pt may have sundowning   -continue home Aricept -hx of dementia  -as per wife pt may have sundowning   -will get speech and swallow day after surgery to assess for diet reccs   -continue home Aricept UA bacteria too numerous to count, 26-50 wbcs, +nitrite, moderate leuk esterase   start ceftriaxone   f/u urine cx, blood cx UA bacteria too numerous to count, 26-50 wbcs, +nitrite, moderate leuk esterase   -fall may have been a consequence of UTI  -start ceftriaxone q24  -f/u urine cx, blood cx

## 2021-08-22 NOTE — H&P ADULT - PROBLEM SELECTOR PLAN 8
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   -Liver hemangioma 2.3 x 1.9 cm.   -6 mm nonobstructing calculus in the lower pole of the left kidney and midpole of the left kidney.   -Diffuse bladder wall thickening of the of the urinary bladder most prominent at the posterior wall, and numerous bladder diverticulum again demonstrated.   -Small calcification at the posterior left bladder wall again demonstrated.  -f/u outpatient

## 2021-08-22 NOTE — ED PROVIDER NOTE - PROGRESS NOTE DETAILS
Dw Pt and wife re all findings, need for admission. Des Snyder, [facility], Dr Henriquez will consult. Des Elias ([facility] will see pt to admit.

## 2021-08-22 NOTE — H&P ADULT - PROBLEM SELECTOR PLAN 6
-severe COPD   -former smoker 50 years x 1-2 ppd  -CT chest mucoid impacted airways with chronic aspiration in the right lower lobe  -monitor -continue home pravastatin

## 2021-08-22 NOTE — H&P ADULT - PROBLEM SELECTOR PLAN 9
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 Henriquez    BPH - continue home flomax     Glaucoma - continue home timolol and Latanoprost eye drops BPH - continue home flomax   monitor for post-op retention

## 2021-08-22 NOTE — H&P ADULT - PROBLEM SELECTOR PLAN 10
VTE ppx: SCDs, plan for OR w/ Dr. Henriquez    Glaucoma - continue home timolol and Latanoprost eye drops

## 2021-08-23 DIAGNOSIS — J44.9 CHRONIC OBSTRUCTIVE PULMONARY DISEASE, UNSPECIFIED: ICD-10-CM
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
## 2021-08-23 DIAGNOSIS — N39.0 URINARY TRACT INFECTION, SITE NOT SPECIFIED: ICD-10-CM

## 2021-08-23 DIAGNOSIS — Z74.09 OTHER REDUCED MOBILITY: ICD-10-CM

## 2021-08-23 DIAGNOSIS — G89.18 OTHER ACUTE POSTPROCEDURAL PAIN: ICD-10-CM

## 2021-08-23 DIAGNOSIS — N40.0 BENIGN PROSTATIC HYPERPLASIA WITHOUT LOWER URINARY TRACT SYMPTOMS: ICD-10-CM

## 2021-08-23 DIAGNOSIS — S72.142A DISPLACED INTERTROCHANTERIC FRACTURE OF LEFT FEMUR, INITIAL ENCOUNTER FOR CLOSED FRACTURE: ICD-10-CM

## 2021-08-23 PROBLEM — M41.9 SCOLIOSIS, UNSPECIFIED: Chronic | Status: ACTIVE | Noted: 2018-10-11

## 2021-08-23 PROBLEM — R05 COUGH: Chronic | Status: ACTIVE | Noted: 2018-10-11

## 2021-08-23 LAB
A1C WITH ESTIMATED AVERAGE GLUCOSE RESULT: 5.5 % — SIGNIFICANT CHANGE UP (ref 4–5.6)
ALBUMIN SERPL ELPH-MCNC: 2.7 G/DL — LOW (ref 3.3–5)
ALP SERPL-CCNC: 120 U/L — SIGNIFICANT CHANGE UP (ref 40–120)
ALT FLD-CCNC: 23 U/L — SIGNIFICANT CHANGE UP (ref 12–78)
ANION GAP SERPL CALC-SCNC: 5 MMOL/L — SIGNIFICANT CHANGE UP (ref 5–17)
ANION GAP SERPL CALC-SCNC: 9 MMOL/L — SIGNIFICANT CHANGE UP (ref 5–17)
APTT BLD: 30.9 SEC — SIGNIFICANT CHANGE UP (ref 27.5–35.5)
AST SERPL-CCNC: 16 U/L — SIGNIFICANT CHANGE UP (ref 15–37)
BASOPHILS # BLD AUTO: 0.01 K/UL — SIGNIFICANT CHANGE UP (ref 0–0.2)
BASOPHILS NFR BLD AUTO: 0.1 % — SIGNIFICANT CHANGE UP (ref 0–2)
BILIRUB SERPL-MCNC: 0.6 MG/DL — SIGNIFICANT CHANGE UP (ref 0.2–1.2)
BUN SERPL-MCNC: 22 MG/DL — SIGNIFICANT CHANGE UP (ref 7–23)
BUN SERPL-MCNC: 23 MG/DL — SIGNIFICANT CHANGE UP (ref 7–23)
CALCIUM SERPL-MCNC: 7.9 MG/DL — LOW (ref 8.5–10.1)
CALCIUM SERPL-MCNC: 8.2 MG/DL — LOW (ref 8.5–10.1)
CHLORIDE SERPL-SCNC: 106 MMOL/L — SIGNIFICANT CHANGE UP (ref 96–108)
CHLORIDE SERPL-SCNC: 108 MMOL/L — SIGNIFICANT CHANGE UP (ref 96–108)
CHOLEST SERPL-MCNC: 134 MG/DL — SIGNIFICANT CHANGE UP
CO2 SERPL-SCNC: 26 MMOL/L — SIGNIFICANT CHANGE UP (ref 22–31)
CO2 SERPL-SCNC: 26 MMOL/L — SIGNIFICANT CHANGE UP (ref 22–31)
CREAT SERPL-MCNC: 0.68 MG/DL — SIGNIFICANT CHANGE UP (ref 0.5–1.3)
CREAT SERPL-MCNC: 0.78 MG/DL — SIGNIFICANT CHANGE UP (ref 0.5–1.3)
EOSINOPHIL # BLD AUTO: 0.01 K/UL — SIGNIFICANT CHANGE UP (ref 0–0.5)
EOSINOPHIL NFR BLD AUTO: 0.1 % — SIGNIFICANT CHANGE UP (ref 0–6)
ESTIMATED AVERAGE GLUCOSE: 111 MG/DL — SIGNIFICANT CHANGE UP (ref 68–114)
GLUCOSE SERPL-MCNC: 108 MG/DL — HIGH (ref 70–99)
GLUCOSE SERPL-MCNC: 96 MG/DL — SIGNIFICANT CHANGE UP (ref 70–99)
HCT VFR BLD CALC: 35.9 % — LOW (ref 39–50)
HCT VFR BLD CALC: 40.6 % — SIGNIFICANT CHANGE UP (ref 39–50)
HDLC SERPL-MCNC: 51 MG/DL — SIGNIFICANT CHANGE UP
HGB BLD-MCNC: 12 G/DL — LOW (ref 13–17)
HGB BLD-MCNC: 13.4 G/DL — SIGNIFICANT CHANGE UP (ref 13–17)
IMM GRANULOCYTES NFR BLD AUTO: 0.5 % — SIGNIFICANT CHANGE UP (ref 0–1.5)
INR BLD: 1.06 RATIO — SIGNIFICANT CHANGE UP (ref 0.88–1.16)
LIPID PNL WITH DIRECT LDL SERPL: 72 MG/DL — SIGNIFICANT CHANGE UP
LYMPHOCYTES # BLD AUTO: 0.84 K/UL — LOW (ref 1–3.3)
LYMPHOCYTES # BLD AUTO: 10.4 % — LOW (ref 13–44)
MCHC RBC-ENTMCNC: 30.6 PG — SIGNIFICANT CHANGE UP (ref 27–34)
MCHC RBC-ENTMCNC: 30.9 PG — SIGNIFICANT CHANGE UP (ref 27–34)
MCHC RBC-ENTMCNC: 33 GM/DL — SIGNIFICANT CHANGE UP (ref 32–36)
MCHC RBC-ENTMCNC: 33.4 GM/DL — SIGNIFICANT CHANGE UP (ref 32–36)
MCV RBC AUTO: 92.5 FL — SIGNIFICANT CHANGE UP (ref 80–100)
MCV RBC AUTO: 92.7 FL — SIGNIFICANT CHANGE UP (ref 80–100)
MONOCYTES # BLD AUTO: 1 K/UL — HIGH (ref 0–0.9)
MONOCYTES NFR BLD AUTO: 12.4 % — SIGNIFICANT CHANGE UP (ref 2–14)
NEUTROPHILS # BLD AUTO: 6.16 K/UL — SIGNIFICANT CHANGE UP (ref 1.8–7.4)
NEUTROPHILS NFR BLD AUTO: 76.5 % — SIGNIFICANT CHANGE UP (ref 43–77)
NON HDL CHOLESTEROL: 83 MG/DL — SIGNIFICANT CHANGE UP
NRBC # BLD: 0 /100 WBCS — SIGNIFICANT CHANGE UP (ref 0–0)
NRBC # BLD: 0 /100 WBCS — SIGNIFICANT CHANGE UP (ref 0–0)
PLATELET # BLD AUTO: 149 K/UL — LOW (ref 150–400)
PLATELET # BLD AUTO: 176 K/UL — SIGNIFICANT CHANGE UP (ref 150–400)
POTASSIUM SERPL-MCNC: 3.8 MMOL/L — SIGNIFICANT CHANGE UP (ref 3.5–5.3)
POTASSIUM SERPL-MCNC: 4.1 MMOL/L — SIGNIFICANT CHANGE UP (ref 3.5–5.3)
POTASSIUM SERPL-SCNC: 3.8 MMOL/L — SIGNIFICANT CHANGE UP (ref 3.5–5.3)
POTASSIUM SERPL-SCNC: 4.1 MMOL/L — SIGNIFICANT CHANGE UP (ref 3.5–5.3)
PROT SERPL-MCNC: 6.6 G/DL — SIGNIFICANT CHANGE UP (ref 6–8.3)
PROTHROM AB SERPL-ACNC: 12.4 SEC — SIGNIFICANT CHANGE UP (ref 10.6–13.6)
RBC # BLD: 3.88 M/UL — LOW (ref 4.2–5.8)
RBC # BLD: 4.38 M/UL — SIGNIFICANT CHANGE UP (ref 4.2–5.8)
RBC # FLD: 13.2 % — SIGNIFICANT CHANGE UP (ref 10.3–14.5)
RBC # FLD: 13.2 % — SIGNIFICANT CHANGE UP (ref 10.3–14.5)
SARS-COV-2 RNA SPEC QL NAA+PROBE: SIGNIFICANT CHANGE UP
SODIUM SERPL-SCNC: 139 MMOL/L — SIGNIFICANT CHANGE UP (ref 135–145)
SODIUM SERPL-SCNC: 141 MMOL/L — SIGNIFICANT CHANGE UP (ref 135–145)
TRIGL SERPL-MCNC: 52 MG/DL — SIGNIFICANT CHANGE UP
TSH SERPL-MCNC: 1.35 UIU/ML — SIGNIFICANT CHANGE UP (ref 0.36–3.74)
WBC # BLD: 11.15 K/UL — HIGH (ref 3.8–10.5)
WBC # BLD: 8.06 K/UL — SIGNIFICANT CHANGE UP (ref 3.8–10.5)
WBC # FLD AUTO: 11.15 K/UL — HIGH (ref 3.8–10.5)
WBC # FLD AUTO: 8.06 K/UL — SIGNIFICANT CHANGE UP (ref 3.8–10.5)

## 2021-08-23 PROCEDURE — 93306 TTE W/DOPPLER COMPLETE: CPT | Mod: 26

## 2021-08-23 PROCEDURE — 99222 1ST HOSP IP/OBS MODERATE 55: CPT

## 2021-08-23 RX ORDER — OXYCODONE HYDROCHLORIDE 5 MG/1
5 TABLET ORAL
Refills: 0 | Status: DISCONTINUED | OUTPATIENT
Start: 2021-08-23 | End: 2021-08-23

## 2021-08-23 RX ORDER — ACETAMINOPHEN 500 MG
650 TABLET ORAL EVERY 6 HOURS
Refills: 0 | Status: DISCONTINUED | OUTPATIENT
Start: 2021-08-23 | End: 2021-08-27

## 2021-08-23 RX ORDER — SENNA PLUS 8.6 MG/1
2 TABLET ORAL AT BEDTIME
Refills: 0 | Status: DISCONTINUED | OUTPATIENT
Start: 2021-08-23 | End: 2021-08-27

## 2021-08-23 RX ORDER — BUDESONIDE AND FORMOTEROL FUMARATE DIHYDRATE 160; 4.5 UG/1; UG/1
2 AEROSOL RESPIRATORY (INHALATION)
Refills: 0 | Status: DISCONTINUED | OUTPATIENT
Start: 2021-08-23 | End: 2021-08-27

## 2021-08-23 RX ORDER — IPRATROPIUM/ALBUTEROL SULFATE 18-103MCG
3 AEROSOL WITH ADAPTER (GRAM) INHALATION EVERY 6 HOURS
Refills: 0 | Status: DISCONTINUED | OUTPATIENT
Start: 2021-08-23 | End: 2021-08-23

## 2021-08-23 RX ORDER — TIOTROPIUM BROMIDE 18 UG/1
1 CAPSULE ORAL; RESPIRATORY (INHALATION) DAILY
Refills: 0 | Status: DISCONTINUED | OUTPATIENT
Start: 2021-08-23 | End: 2021-08-23

## 2021-08-23 RX ORDER — MAGNESIUM HYDROXIDE 400 MG/1
30 TABLET, CHEWABLE ORAL DAILY
Refills: 0 | Status: DISCONTINUED | OUTPATIENT
Start: 2021-08-23 | End: 2021-08-27

## 2021-08-23 RX ORDER — ONDANSETRON 8 MG/1
4 TABLET, FILM COATED ORAL EVERY 6 HOURS
Refills: 0 | Status: DISCONTINUED | OUTPATIENT
Start: 2021-08-23 | End: 2021-08-27

## 2021-08-23 RX ORDER — SODIUM CHLORIDE 9 MG/ML
1000 INJECTION, SOLUTION INTRAVENOUS
Refills: 0 | Status: DISCONTINUED | OUTPATIENT
Start: 2021-08-23 | End: 2021-08-24

## 2021-08-23 RX ORDER — SODIUM CHLORIDE 9 MG/ML
1000 INJECTION, SOLUTION INTRAVENOUS
Refills: 0 | Status: DISCONTINUED | OUTPATIENT
Start: 2021-08-23 | End: 2021-08-23

## 2021-08-23 RX ORDER — LATANOPROST 0.05 MG/ML
1 SOLUTION/ DROPS OPHTHALMIC; TOPICAL AT BEDTIME
Refills: 0 | Status: DISCONTINUED | OUTPATIENT
Start: 2021-08-23 | End: 2021-08-27

## 2021-08-23 RX ORDER — TIOTROPIUM BROMIDE 18 UG/1
1 CAPSULE ORAL; RESPIRATORY (INHALATION) DAILY
Refills: 0 | Status: DISCONTINUED | OUTPATIENT
Start: 2021-08-23 | End: 2021-08-27

## 2021-08-23 RX ORDER — TRAMADOL HYDROCHLORIDE 50 MG/1
50 TABLET ORAL
Refills: 0 | Status: DISCONTINUED | OUTPATIENT
Start: 2021-08-23 | End: 2021-08-23

## 2021-08-23 RX ORDER — OXYCODONE HYDROCHLORIDE 5 MG/1
10 TABLET ORAL EVERY 4 HOURS
Refills: 0 | Status: DISCONTINUED | OUTPATIENT
Start: 2021-08-23 | End: 2021-08-27

## 2021-08-23 RX ORDER — SENNA PLUS 8.6 MG/1
2 TABLET ORAL AT BEDTIME
Refills: 0 | Status: DISCONTINUED | OUTPATIENT
Start: 2021-08-23 | End: 2021-08-23

## 2021-08-23 RX ORDER — OXYCODONE HYDROCHLORIDE 5 MG/1
5 TABLET ORAL EVERY 4 HOURS
Refills: 0 | Status: DISCONTINUED | OUTPATIENT
Start: 2021-08-23 | End: 2021-08-27

## 2021-08-23 RX ORDER — PRIMIDONE 250 MG/1
50 TABLET ORAL
Refills: 0 | Status: DISCONTINUED | OUTPATIENT
Start: 2021-08-23 | End: 2021-08-27

## 2021-08-23 RX ORDER — RIVAROXABAN 15 MG-20MG
10 KIT ORAL DAILY
Refills: 0 | Status: DISCONTINUED | OUTPATIENT
Start: 2021-08-24 | End: 2021-08-27

## 2021-08-23 RX ORDER — FOLIC ACID 0.8 MG
1 TABLET ORAL DAILY
Refills: 0 | Status: DISCONTINUED | OUTPATIENT
Start: 2021-08-23 | End: 2021-08-27

## 2021-08-23 RX ORDER — TOPIRAMATE 25 MG
25 TABLET ORAL
Refills: 0 | Status: DISCONTINUED | OUTPATIENT
Start: 2021-08-23 | End: 2021-08-27

## 2021-08-23 RX ORDER — CEFAZOLIN SODIUM 1 G
2000 VIAL (EA) INJECTION ONCE
Refills: 0 | Status: DISCONTINUED | OUTPATIENT
Start: 2021-08-23 | End: 2021-08-23

## 2021-08-23 RX ORDER — CEFAZOLIN SODIUM 1 G
2000 VIAL (EA) INJECTION EVERY 8 HOURS
Refills: 0 | Status: COMPLETED | OUTPATIENT
Start: 2021-08-23 | End: 2021-08-24

## 2021-08-23 RX ORDER — MORPHINE SULFATE 50 MG/1
2 CAPSULE, EXTENDED RELEASE ORAL EVERY 4 HOURS
Refills: 0 | Status: DISCONTINUED | OUTPATIENT
Start: 2021-08-23 | End: 2021-08-23

## 2021-08-23 RX ORDER — HYDROMORPHONE HYDROCHLORIDE 2 MG/ML
0.5 INJECTION INTRAMUSCULAR; INTRAVENOUS; SUBCUTANEOUS
Refills: 0 | Status: DISCONTINUED | OUTPATIENT
Start: 2021-08-23 | End: 2021-08-23

## 2021-08-23 RX ORDER — LACTOBACILLUS ACIDOPHILUS 100MM CELL
1 CAPSULE ORAL DAILY
Refills: 0 | Status: DISCONTINUED | OUTPATIENT
Start: 2021-08-23 | End: 2021-08-27

## 2021-08-23 RX ORDER — PANTOPRAZOLE SODIUM 20 MG/1
40 TABLET, DELAYED RELEASE ORAL
Refills: 0 | Status: DISCONTINUED | OUTPATIENT
Start: 2021-08-23 | End: 2021-08-27

## 2021-08-23 RX ORDER — TIMOLOL 0.5 %
1 DROPS OPHTHALMIC (EYE) DAILY
Refills: 0 | Status: DISCONTINUED | OUTPATIENT
Start: 2021-08-23 | End: 2021-08-27

## 2021-08-23 RX ORDER — ASCORBIC ACID 60 MG
500 TABLET,CHEWABLE ORAL
Refills: 0 | Status: DISCONTINUED | OUTPATIENT
Start: 2021-08-23 | End: 2021-08-27

## 2021-08-23 RX ORDER — BUDESONIDE AND FORMOTEROL FUMARATE DIHYDRATE 160; 4.5 UG/1; UG/1
2 AEROSOL RESPIRATORY (INHALATION)
Refills: 0 | Status: DISCONTINUED | OUTPATIENT
Start: 2021-08-23 | End: 2021-08-23

## 2021-08-23 RX ORDER — TAMSULOSIN HYDROCHLORIDE 0.4 MG/1
0.4 CAPSULE ORAL AT BEDTIME
Refills: 0 | Status: DISCONTINUED | OUTPATIENT
Start: 2021-08-23 | End: 2021-08-27

## 2021-08-23 RX ADMIN — PRIMIDONE 50 MILLIGRAM(S): 250 TABLET ORAL at 05:33

## 2021-08-23 RX ADMIN — Medication 25 MILLIGRAM(S): at 05:33

## 2021-08-23 RX ADMIN — PRIMIDONE 50 MILLIGRAM(S): 250 TABLET ORAL at 00:28

## 2021-08-23 RX ADMIN — LATANOPROST 1 DROP(S): 0.05 SOLUTION/ DROPS OPHTHALMIC; TOPICAL at 23:01

## 2021-08-23 RX ADMIN — HYDROMORPHONE HYDROCHLORIDE 0.5 MILLIGRAM(S): 2 INJECTION INTRAMUSCULAR; INTRAVENOUS; SUBCUTANEOUS at 19:40

## 2021-08-23 RX ADMIN — TRAMADOL HYDROCHLORIDE 50 MILLIGRAM(S): 50 TABLET ORAL at 13:36

## 2021-08-23 RX ADMIN — Medication 25 MILLIGRAM(S): at 00:28

## 2021-08-23 RX ADMIN — SENNA PLUS 2 TABLET(S): 8.6 TABLET ORAL at 23:01

## 2021-08-23 RX ADMIN — SODIUM CHLORIDE 75 MILLILITER(S): 9 INJECTION, SOLUTION INTRAVENOUS at 10:26

## 2021-08-23 RX ADMIN — Medication 3 MILLILITER(S): at 04:23

## 2021-08-23 RX ADMIN — Medication 3 MILLILITER(S): at 13:07

## 2021-08-23 RX ADMIN — MORPHINE SULFATE 2 MILLIGRAM(S): 50 CAPSULE, EXTENDED RELEASE ORAL at 05:48

## 2021-08-23 RX ADMIN — Medication 3 MILLILITER(S): at 06:51

## 2021-08-23 RX ADMIN — Medication 25 MILLIGRAM(S): at 23:01

## 2021-08-23 RX ADMIN — TAMSULOSIN HYDROCHLORIDE 0.4 MILLIGRAM(S): 0.4 CAPSULE ORAL at 23:01

## 2021-08-23 RX ADMIN — CEFTRIAXONE 1000 MILLIGRAM(S): 500 INJECTION, POWDER, FOR SOLUTION INTRAMUSCULAR; INTRAVENOUS at 00:30

## 2021-08-23 RX ADMIN — MORPHINE SULFATE 2 MILLIGRAM(S): 50 CAPSULE, EXTENDED RELEASE ORAL at 05:33

## 2021-08-23 RX ADMIN — TAMSULOSIN HYDROCHLORIDE 0.4 MILLIGRAM(S): 0.4 CAPSULE ORAL at 00:29

## 2021-08-23 RX ADMIN — HYDROMORPHONE HYDROCHLORIDE 0.5 MILLIGRAM(S): 2 INJECTION INTRAMUSCULAR; INTRAVENOUS; SUBCUTANEOUS at 19:25

## 2021-08-23 RX ADMIN — DONEPEZIL HYDROCHLORIDE 5 MILLIGRAM(S): 10 TABLET, FILM COATED ORAL at 00:29

## 2021-08-23 RX ADMIN — PRIMIDONE 50 MILLIGRAM(S): 250 TABLET ORAL at 23:01

## 2021-08-23 RX ADMIN — SODIUM CHLORIDE 75 MILLILITER(S): 9 INJECTION, SOLUTION INTRAVENOUS at 23:01

## 2021-08-23 RX ADMIN — TRAMADOL HYDROCHLORIDE 50 MILLIGRAM(S): 50 TABLET ORAL at 12:47

## 2021-08-23 NOTE — CONSULT NOTE ADULT - SUBJECTIVE AND OBJECTIVE BOX
Pt is an 85 yo M with a left subtrochanteric femoral shaft fracture, BIBEMS, after sustaining a mechanical fall while leaving the bathroom last night. Pt states he tripped on a rug. Pt denies head strike, LOC and other injury. Pt has constant, nonradiated pain over the proximal femur.     Pt ambulates occasionally with a cane. Pt lives at home with wife.     PMH: Dementia, essential tremor, COPD, Scoliosis, HLD, partial colon resection    VITAL SIGNS:  T(C): 36.7 (21 @ 07:23), Max: 37.5 (21 @ 20:11)  HR: 56 (21 @ 07:23) (44 - 103)  BP: 121/59 (21 @ 07:23) (121/59 - 169/73)  RR: 18 (21 @ 07:23) (17 - 18)  SpO2: 100% (21 @ 07:23) (91% - 100%)    LABS:                        13.4   8.06  )-----------( 176      ( 23 Aug 2021 06:55 )             40.6         141  |  106  |  23  ----------------------------<  96  4.1   |  26  |  0.68    Ca    8.2<L>      23 Aug 2021 06:55    TPro  6.6  /  Alb  2.7<L>  /  TBili  0.6  /  DBili  x   /  AST  16  /  ALT  23  /  AlkPhos  120  08    PT/INR - ( 23 Aug 2021 08:41 )   PT: 12.4 sec;   INR: 1.06 ratio         PTT - ( 23 Aug 2021 08:41 )  PTT:30.9 sec  Urinalysis Basic - ( 22 Aug 2021 23:19 )    Color: Yellow / Appearance: Slightly Turbid / S.010 / pH: x  Gluc: x / Ketone: Small  / Bili: Negative / Urobili: Negative   Blood: x / Protein: 30 mg/dL / Nitrite: Positive   Leuk Esterase: Moderate / RBC: 3-5 /HPF / WBC 26-50   Sq Epi: x / Non Sq Epi: Occasional / Bacteria: TNTC    PE:   GEN: NAD, resting in bed    LLE: Skin intact, no gross deformity,   TTP over the bony prominences of the proximal femur  NTTP over knee/ankle/foot  painless active ROM of the ankle/foot,   L2-S1 SILT  motor grossly intact throughout hip TA/EHL/FHL/GSC  Unable to assess flexors/quads/hams 2/2 pain  + DP pulses  compartments soft and compressible b/l  calves nontender b/l    RLE:  No tenderness over mary prominences no pain with log roll, no pain on axial loading    SPINE: Skin intact, no bony tenderness or step-offs appreciated throughout cervical/thoracic/lumbar/sacral spine    SECONDARY EXAM: Benign, Skin intact, SILT throughout, motor grossly intact throughout, no other orthopedic injuries at this time, compartments soft and compressible.     Imaging reviewed: Nondisplaced Left subtrochanteric femur fx

## 2021-08-23 NOTE — CONSULT NOTE ADULT - PROBLEM SELECTOR PROBLEM 1
Fracture of femur, intertrochanteric, left, closed, initial encounter
COPD (chronic obstructive pulmonary disease)

## 2021-08-23 NOTE — ED ADULT NURSE REASSESSMENT NOTE - NS ED NURSE REASSESS COMMENT FT1
spouse took bilateral hearing aid and dentures home.
Received pt alert with dementia pt knows himself. Pt denies SOB abd pain and chest pain. Pt is on stretcher. Pt is now in echo. Calling for report. Safety/Comfort maintained. Will continue to monitor. Freq rounding performed.

## 2021-08-23 NOTE — CHART NOTE - NSCHARTNOTEFT_GEN_A_CORE
Called to speak with patient's wife, Felicia Landrum, about care plan for the patient and procedure. Wife is HCP and make's patient's decisions. The best way to reach her is [phone]. Additional number given by wife: [phone]

## 2021-08-23 NOTE — BRIEF OPERATIVE NOTE - NSICDXBRIEFPOSTOP_GEN_ALL_CORE_FT
POST-OP DIAGNOSIS:  Fracture of femur, intertrochanteric, left, closed, initial encounter 23-Aug-2021 18:46:03  Orlando Mancini

## 2021-08-23 NOTE — PROGRESS NOTE ADULT - SUBJECTIVE AND OBJECTIVE BOX
Orthopaedic PA    Procedure: s/p Intramedullary nailing of L IT fracture  Surgeon: Florence    86y Male comfortable, somnolent and without complaints.     Denies chest pain, shortness of breath, palpitations, nausea, vomiting, dizziness, fevers, chills    PE:  Vital Signs Last 24 Hrs  T(C): 36.4 (23 Aug 2021 21:05), Max: 37.1 (23 Aug 2021 12:00)  T(F): 97.5 (23 Aug 2021 21:05), Max: 98.7 (23 Aug 2021 12:00)  HR: 60 (23 Aug 2021 21:05) (56 - 80)  BP: 127/71 (23 Aug 2021 21:05) (100/74 - 152/66)  BP(mean): --  RR: 16 (23 Aug 2021 21:05) (10 - 20)  SpO2: 91% (23 Aug 2021 21:05) (91% - 100%)  General:  A + O x 3 in NAD    Left Hip Aquacel Dressing: C/D/I     Lower Extremity Exam:         5/5 Tibialis Anterior ( dorsiflexion )      5/5 Gastrocsoleus ( plantarflexion )      5/5 Extensor Hallucis Longus ( toe extension )      5/5  Flexor Hallucis Longus ( toe flexion)            Sensation intact to Light touch L2-S1    +2 DP/PT Pulses  Compartments soft and compressible  No calf tenderness bilaterally                          12.0   11.15 )-----------( 149      ( 23 Aug 2021 19:23 )             35.9       08-23    139  |  108  |  22  ----------------------------<  108<H>  3.8   |  26  |  0.78    Ca    7.9<L>      23 Aug 2021 19:23    TPro  6.6  /  Alb  2.7<L>  /  TBili  0.6  /  DBili  x   /  AST  16  /  ALT  23  /  AlkPhos  120  08-23

## 2021-08-23 NOTE — CONSULT NOTE ADULT - ASSESSMENT
85 yo M s/p MF w/ Left Subtrochanteric femur fx    -Plan or OR today  -NPO for OR  -Hold AC for OR   -DVT ppx w/ SCDs  -Strict bedrest  -Please continue to document Medical/Pulm/Cardio clearance/optimization for OR  -Medical management appreciated 87 yo M s/p MF w/ Left Subtrochanteric femur fx    -Medical management appreciated - Please continue to document Medical/Pulm/Cardio clearance/optimization for OR  -Imaging reviewed w L Subtroch Femur Fx  -NPO for OR except medications; IVF while NPO  -Hold DVT ppx for OR today; SCDs okay  -Strict bedrest/NWB  -Preop labs/EKG/CXR/COVID obtained and reviewed  -Plan for OR today with [facility] attending for IMN  -Discussed with on call attending Dr. Winters who agrees with plan

## 2021-08-23 NOTE — SWALLOW BEDSIDE ASSESSMENT ADULT - COMMENTS
Per charting, the patient is a "86 M PMHX dementia, essential tremor, COPD not on home O2, severe scoliosis, HLD, hx of partial colon resection, here for mechanical fall at 18:30. Pt was walking from the bathroom to the kitchen and tripped. Occurred on carpeted floor, using his cane, landed on left side. No head trauma, no LOC. Pt denies palpitations, CP, SOB, dizziness, lightheadedness prior to fall. As per wife, pt has been his usual self. Pt has slow heart rate at rest, on propanolol for essential tremor. Patient is a poor historian and history is limited."    CT CHEST 8/22: "Comminuted intertrochanteric fracture of the proximal left femur. Mucoid impacted airways with chronic aspiration in the right lower lobe."    Consult received and chart reviewed. Patient seen at bedside this afternoon for an assessment of swallow function, at which time he was awake/alert and oriented X3. The patient was able to follow directives, answer yes/no questions and communicate wants/needs. He reported wearing dentures at home, however they are not present in the hospital.

## 2021-08-23 NOTE — CONSULT NOTE ADULT - ASSESSMENT
Pt. with left hip fx.  COPD with some mucus plugging.   Suggest chest percussion, inhalers.  DVT prophylaxis.  Moderate pulmonary risk for surgery. Pt. with left hip fx.  COPD with some mucus plugging.   Has UTI.   Dementia  Suggest chest percussion, inhalers.  DVT prophylaxis.  Moderate pulmonary risk for surgery.

## 2021-08-23 NOTE — CONSULT NOTE ADULT - ASSESSMENT
Patient is a 86 year old male with PMH of dementia, essential tremor, COPD (not on home O2), severe scoliosis, HLD, hx of partial colon resection. He was BIBA s/p suspected mechanical fall and was found to have left proximal femur fracture, is being evaluated for Sinus shanti and surgical clearance    # Sinus Bradycardia:   - Patent is S/P mechanical fall and was found to have left proxima femur fracture  - Admission EKG shows sinus Shanti with HR of 48 with no ischemic changes.  - Patient remained asymptomatic  - chart review shows that patient was Sinus shanti back in 2018, likely chronic   - ECHO: preliminary read shows very limited acoustic window, technically difficult study,   - Further cardiac work up depending on clinical course  - Continue to monitor routine hemodynamics  - On Propranolol 80mg bid for intentional termers  - Will recommend to resume Propranolol pre op  - Will follow    # PreOp Latrell:  - Patient is scheduled for left femur fracture repair later today  - No significant cardiac history   - Unknown functional capacity, likely limited as patient uses cane at home for ambulation   - RCRI score 3.9%  - On Propranolol 80mg bid for intentional termers  - Will recommend to resume Propranolol pre op  - Patient is medically optimized and clinically stable and is intermediate risk for this intermediate risk procedure   - May proceed with the surgery with routine hemodynamic monitoring   - will continue to follow.    Patient is a 86 year old male with PMH of dementia, essential tremor, COPD (not on home O2), severe scoliosis, HLD, hx of partial colon resection. He was BIBA s/p suspected mechanical fall and was found to have left proximal femur fracture, is being evaluated for Sinus shanti and surgical clearance    # Sinus Bradycardia:   - Patent is S/P mechanical fall and was found to have left proxima femur fracture  - Admission EKG shows sinus Shanti with HR of 48 with no ischemic changes.  - Patient remained asymptomatic  - chart review shows that patient was Sinus shanti back in 2018, likely chronic   - ECHO: preliminary read shows very limited acoustic window, technically difficult study,   - Further cardiac work up depending on clinical course  - Continue to monitor routine hemodynamics  - On Propranolol 80mg bid for intention tremors  - Will recommend to resume Propranolol pre op  - Will follow    # PreOp Latrell:  - Patient is scheduled for left femur fracture repair later today  - No significant cardiac history   - Unknown functional capacity, likely limited as patient uses cane at home for ambulation   - RCRI score 3.9%  - On Propranolol 80mg bid for intentional termers  - Will recommend to resume Propranolol pre op  - Patient is medically optimized and clinically stable and is intermediate risk for this intermediate risk procedure   - May proceed with the surgery with routine hemodynamic monitoring   - will continue to follow.

## 2021-08-23 NOTE — CONSULT NOTE ADULT - SUBJECTIVE AND OBJECTIVE BOX
Physical Medicine and Rehabilitation Initial Evaluation    Patients acute care records reviewed and are summarized as follows:     Patient is an 86M with past medical history including COPD, dementia, essential tremor, scoliosis, HLD who is admitted to acute care following a mechanical fall during which he sustained a comminuted intertrochanteric fracture of the left femur. Patient now s/p intramedullary nailing of the left femur, with subsequent postoperative pain.    Radiological studies reviewed, including CT of the head as well as CT of the abd/pelvis which reveals no acute intracranial pathology, and positive left intertrochanteric comminuted fx of proximal femur.    Medical studies/laboratory studies reviewed, includin.0   1115 )-----------( 149      ( 23 Aug 2021 19:23 )             35.9   08-23    139  |  108  |  22  ----------------------------<  108<H>  3.8   |  26  |  0.78    Ca    7.9<L>      23 Aug 2021 19:23    TPro  6.6  /  Alb  2.7<L>  /  TBili  0.6  /  DBili  x   /  AST  16  /  ALT  23  /  AlkPhos  120  08-23    Collateral history obtained from patients wife Felicia as patient not able to provide sufficient history at this time. Patients prior functional ability was modified independent with ambulation with single point cane, required assistance with lower body dressing, was able to shower independently. Patient not yet to formally assessed by PT, anticipate tomorrow however given his history of tremor, COPD, dementia, which at baseline patient can have short term memory difficulty, and at times is confused with regard to orientation/time of day etc, I anticipate he is likely to be a candidate for subacute rehabilitation.     ROS: Patient not able to provide full ROS at this time     PM, Social, Family Hx: as above in HPI, Family history reviewed and found to be non pertinent to patients current disposition, denies history of alcohol, illicit drug use,  pack year history of cigarette use.    Physical Exam:   Vitals: Vital Signs Last 24 Hrs  T(C): 36.4 (23 Aug 2021 20:37), Max: 37.1 (23 Aug 2021 12:00)  T(F): 97.5 (23 Aug 2021 20:37), Max: 98.7 (23 Aug 2021 12:00)  HR: 65 (23 Aug 2021 20:37) (47 - 80)  BP: 120/58 (23 Aug 2021 20:37) (100/74 - 169/73)  BP(mean): --  RR: 12 (23 Aug 2021 20:37) (10 - 20)  SpO2: 100% (23 Aug 2021 20:37) (92% - 100%)    Constitutional: Gen: In no acute distress, cooperative with exam and questioning   Eyes: PERRL, no erythema of conjunctivae  Ears/Nose/Mouth/Throat: Mucous membranes moist, no thrush, no rhinorrhea  CV: Regular rate and rhythm, S1 S2, trace bilateral lower extremity pitting peripheral edema, pedal pulses intact  Resp: Good respiratory effort, Good air movement all lung fields  GI: Nontender, normoactive bowel sounds  Neuro: Cranial Nerves II-XII intact, sensation intact to light touch in peripheral upper and lower extremities  MSK: No cyanosis or clubbing of nails, strength 4- to 4/5 in bilateral upper and lower extremities with exception of left hip limited by pain/swelling, ROM full in all extremities passively with exception of L hip not fully tested due to pain  Neck: No midline tenderness to palpation, supple  Skin: No rashes on limbs, normal temperature on palpation  Psychiatric: Drowsy, oriented to self

## 2021-08-23 NOTE — SWALLOW BEDSIDE ASSESSMENT ADULT - SWALLOW EVAL: DIAGNOSIS
1. The patient demonstrated a mild oral dysphagia for puree, honey thick liquid, nectar thick liquid and thin liquid textures marked by adequate bolus collection, delayed transfer and posterior transport. 2. Mild pharyngeal dysphagia for all consistencies trialed marked by suspected delayed initiation of pharyngeal swallow trigger with + hyolaryngeal elevation noted upon digital palpation without evidence of airway penetration.

## 2021-08-23 NOTE — BRIEF OPERATIVE NOTE - NSICDXBRIEFPREOP_GEN_ALL_CORE_FT
PRE-OP DIAGNOSIS:  Fracture of femur, intertrochanteric, left, closed, initial encounter 23-Aug-2021 18:45:03  Orlando Mancini

## 2021-08-23 NOTE — CONSULT NOTE ADULT - ATTENDING COMMENTS
Optimized for the OR from a cardiac point of view with no evidence of active ischemic heart disease, decompensated heart failure, severe obstructive valvular disease, or uncontrolled arrhythmia.

## 2021-08-23 NOTE — CONSULT NOTE ADULT - SUBJECTIVE AND OBJECTIVE BOX
CHARTING IN Progress      [facility]         Leonard Mercado, Shaquille, Deana, Beau Mathis Savella        [phone] (office)    CHIEF COMPLAINT: Patient is a 86y old  Male who presents with a chief complaint of left hip fracture (22 Aug 2021 22:57)      HPI:  86 M PMHX dementia, essential tremor, COPD not on home O2, severe scoliosis, HLD, hx of partial colon resection, here for mechanical fall at 18:30. Pt was walking from the bathroom to the kitchen and tripped. Occurred on carpeted floor, using his cane, landed on left side. No head trauma, no LOC. Pt denies palpitations, CP, SOB, dizziness, lightheadedness prior to fall. As per wife, pt has been his usual self. Pt has slow heart rate at rest, on propanolol for essential tremor. Patient is a poor historian and history is limited.    In the ED,  VS: Tmax 99.5 , bp 159/69, RR 18, 91% on RA. Labs significant for alk p 132.   CT C/A/P: Comminuted intertrochanteric fracture of the proximal left femur. Mucoid impacted airways with chronic aspiration in the right lower lobe. Chronic stable similar to prior imaging: simple hepatic cysts largest measuring 5.7 x 5.2 cm in size. Liver hemangioma 2.3 x 1.9 cm. 6 mm non-obstructing calculus in the lower pole of the left kidney and midpole of the left kidney. Diffuse bladder wall thickening of the of the urinary bladder most prominent at the posterior wall, and numerous bladder diverticulum again demonstrated. Small calcification at the posterior left bladder wall again demonstrated.  EKG sinus shanti rate 48 bpm, no ischemic changes.  (22 Aug 2021 22:57)      PAST MEDICAL & SURGICAL HISTORY:  Scoliosis    Chronic cough    Osteoporosis    Occasional tremors  severe essential tremors    Memory loss  dementia    COPD, severe    HLD (hyperlipidemia)    H/O partial resection of colon        SOCIAL HISTORY: No active tobacco, alcohol or illicit drug use    FAMILY HISTORY:  FHx: dementia (Mother)    FH: colon cancer (Father)    FH: Parkinson&#x27;s disease (Sibling)    FH: stroke (Sibling)        Outpatient medications:    MEDICATIONS  (STANDING):  albuterol/ipratropium for Nebulization 3 milliLiter(s) Nebulizer every 6 hours  cefTRIAXone   IVPB 1000 milliGRAM(s) IV Intermittent every 24 hours  cefTRIAXone   IVPB      donepezil 5 milliGRAM(s) Oral at bedtime  lactated ringers. 1000 milliLiter(s) (75 mL/Hr) IV Continuous <Continuous>  lactobacillus acidophilus 1 Tablet(s) Oral daily  latanoprost 0.005% Ophthalmic Solution 1 Drop(s) Both EYES at bedtime  primidone 50 milliGRAM(s) Oral two times a day  senna 2 Tablet(s) Oral at bedtime  tamsulosin 0.4 milliGRAM(s) Oral at bedtime  timolol 0.5% Solution 1 Drop(s) Both EYES daily  topiramate 25 milliGRAM(s) Oral two times a day    MEDICATIONS  (PRN):  morphine  - Injectable 2 milliGRAM(s) IV Push every 4 hours PRN Severe Pain (7 - 10)      Allergies    azithromycin (Rash)    Intolerances        REVIEW OF SYSTEMS: Is negative for eye, ENT, GI, , allergic, dermatologic, musculoskeletal and neurologic, except as described above.    VITAL SIGNS:   Vital Signs Last 24 Hrs  T(C): 36.7 (23 Aug 2021 07:23), Max: 37.5 (22 Aug 2021 20:11)  T(F): 98 (23 Aug 2021 07:23), Max: 99.5 (22 Aug 2021 20:11)  HR: 56 (23 Aug 2021 07:23) (44 - 103)  BP: 121/59 (23 Aug 2021 07:23) (121/59 - 169/73)  BP(mean): --  RR: 18 (23 Aug 2021 07:23) (17 - 18)  SpO2: 100% (23 Aug 2021 07:23) (91% - 100%)    I&O's Summary      PHYSICAL EXAM:  Constitutional: NAD, awake and alert, well-developed  Eyes:  EOMI, no oral cyanosis, conjunctivae clear, anicteric  Pulmonary: Non-labored, breath sounds are clear bilaterally, no wheezing, rales or rhonchi  Cardiovascular:  irregular S1 and S2, in af, tachy normal rate. No murmur.  No rubs, gallops or clicks  Gastrointestinal: Bowel Sounds present, soft, nontender  Lymph: No peripheral edema   Neurological: Alert, strength and sensitivity are grossly intact  Skin: No obvious lesions/rashes  Psych:  Mood & affect appropriate    LABS: All Labs Reviewed:                        13.4   8.06  )-----------( 176      ( 23 Aug 2021 06:55 )             40.6                         14.6   6.75  )-----------( 233      ( 22 Aug 2021 20:15 )             44.8     23 Aug 2021 06:55    141    |  106    |  23     ----------------------------<  96     4.1     |  26     |  0.68   22 Aug 2021 20:15    140    |  110    |  26     ----------------------------<  87     4.7     |  27     |  0.85     Ca    8.2        23 Aug 2021 06:55  Ca    8.4        22 Aug 2021 20:15    TPro  6.6    /  Alb  2.7    /  TBili  0.6    /  DBili  x      /  AST  16     /  ALT  23     /  AlkPhos  120    23 Aug 2021 06:55  TPro  7.2    /  Alb  2.9    /  TBili  0.5    /  DBili  x      /  AST  18     /  ALT  26     /  AlkPhos  132    22 Aug 2021 20:15    PT/INR - ( 22 Aug 2021 20:15 )   PT: 12.3 sec;   INR: 1.05 ratio         PTT - ( 22 Aug 2021 20:15 )  PTT:31.7 sec  CARDIAC MARKERS ( 22 Aug 2021 20:15 )  x     / x     / 46 U/L / x     / x          Blood Culture:     08-23 @ 06:55  TSH: 1.35      RADIOLOGY:    EKG:                                        CHARTING IN Progress      [facility]         Leonard Mercado, Shaquille, Deana, Delfina, Mitchell Yanez        [phone] (office)    CHIEF COMPLAINT: Patient is a 86y old  Male who presents with a chief complaint of left hip fracture (22 Aug 2021 22:57)      HPI:  86 M PMHX dementia, essential tremor, COPD not on home O2, severe scoliosis, HLD, hx of partial colon resection, here for mechanical fall at 18:30. Pt was walking from the bathroom to the kitchen and tripped. Occurred on carpeted floor, using his cane, landed on left side. No head trauma, no LOC. Pt denies palpitations, CP, SOB, dizziness, lightheadedness prior to fall. As per wife, pt has been his usual self. Pt has slow heart rate at rest, on propanolol for essential tremor. Patient is a poor historian and history is limited.    In the ED,  VS: Tmax 99.5 , bp 159/69, RR 18, 91% on RA. Labs significant for alk p 132.   CT C/A/P: Comminuted intertrochanteric fracture of the proximal left femur. Mucoid impacted airways with chronic aspiration in the right lower lobe. Chronic stable similar to prior imaging: simple hepatic cysts largest measuring 5.7 x 5.2 cm in size. Liver hemangioma 2.3 x 1.9 cm. 6 mm non-obstructing calculus in the lower pole of the left kidney and midpole of the left kidney. Diffuse bladder wall thickening of the of the urinary bladder most prominent at the posterior wall, and numerous bladder diverticulum again demonstrated. Small calcification at the posterior left bladder wall again demonstrated.  EKG sinus shanti rate 48 bpm, no ischemic changes.  (22 Aug 2021 22:57)    Interval HPI: Patient is a 86 year old male with PMH of dementia, essential tremor, COPD (not on home O2), severe scoliosis, HLD, hx of partial colon resection. He was BIBA s/p suspected mechanical fall; pt states he was walking from the bathroom to the kitchen when he tripped. Pt landed on his left side injuring his L rib cage and L proximal femur; pt denies head trauma, LOC. Pt seen and evaluated at bedside at 8:15 today. HR at 57 bpm at time of exam. Pt rates pain of left hip at 8/10 and denies any pain of L ribs. EKG HR 48 bpm, no ischemic changes. Pt denies palpitations, CP, SOB, dizziness, lightheadedness. Pt denies any cardiac Hx and reports he has never seen a Cardiologist. Pt takes propranolol for essential tremor. Of note, pt presented to the [place] ED in October 2018 when marked sinus bradycardia of 43 bpm was noted on EKG.         PAST MEDICAL & SURGICAL HISTORY:  Scoliosis    Chronic cough    Osteoporosis    Occasional tremors  severe essential tremors    Memory loss  dementia    COPD, severe    HLD (hyperlipidemia)    H/O partial resection of colon        SOCIAL HISTORY: No active tobacco, alcohol or illicit drug use    FAMILY HISTORY:  FHx: dementia (Mother)    FH: colon cancer (Father)    FH: Parkinson&#x27;s disease (Sibling)    FH: stroke (Sibling)        Outpatient medications:    MEDICATIONS  (STANDING):  albuterol/ipratropium for Nebulization 3 milliLiter(s) Nebulizer every 6 hours  cefTRIAXone   IVPB 1000 milliGRAM(s) IV Intermittent every 24 hours  cefTRIAXone   IVPB      donepezil 5 milliGRAM(s) Oral at bedtime  lactated ringers. 1000 milliLiter(s) (75 mL/Hr) IV Continuous <Continuous>  lactobacillus acidophilus 1 Tablet(s) Oral daily  latanoprost 0.005% Ophthalmic Solution 1 Drop(s) Both EYES at bedtime  primidone 50 milliGRAM(s) Oral two times a day  senna 2 Tablet(s) Oral at bedtime  tamsulosin 0.4 milliGRAM(s) Oral at bedtime  timolol 0.5% Solution 1 Drop(s) Both EYES daily  topiramate 25 milliGRAM(s) Oral two times a day    MEDICATIONS  (PRN):  morphine  - Injectable 2 milliGRAM(s) IV Push every 4 hours PRN Severe Pain (7 - 10)      Allergies    azithromycin (Rash)    Intolerances        REVIEW OF SYSTEMS: Is negative for eye, ENT, GI, , allergic, dermatologic, musculoskeletal and neurologic, except as described above.    VITAL SIGNS:   Vital Signs Last 24 Hrs  T(C): 36.7 (23 Aug 2021 07:23), Max: 37.5 (22 Aug 2021 20:11)  T(F): 98 (23 Aug 2021 07:23), Max: 99.5 (22 Aug 2021 20:11)  HR: 56 (23 Aug 2021 07:23) (44 - 103)  BP: 121/59 (23 Aug 2021 07:23) (121/59 - 169/73)  BP(mean): --  RR: 18 (23 Aug 2021 07:23) (17 - 18)  SpO2: 100% (23 Aug 2021 07:23) (91% - 100%)    I&O's Summary      PHYSICAL EXAM:  Constitutional: NAD, awake and alert, well-developed  Eyes:  EOMI, no oral cyanosis, conjunctivae clear, anicteric  Pulmonary: Non-labored, breath sounds are clear bilaterally, no wheezing, rales or rhonchi  Cardiovascular:  irregular S1 and S2, in af, tachy normal rate. No murmur.  No rubs, gallops or clicks  Gastrointestinal: Bowel Sounds present, soft, nontender  Lymph: No peripheral edema   Neurological: Alert, strength and sensitivity are grossly intact  Skin: No obvious lesions/rashes  Psych:  Mood & affect appropriate    LABS: All Labs Reviewed:                        13.4   8.06  )-----------( 176      ( 23 Aug 2021 06:55 )             40.6                         14.6   6.75  )-----------( 233      ( 22 Aug 2021 20:15 )             44.8     23 Aug 2021 06:55    141    |  106    |  23     ----------------------------<  96     4.1     |  26     |  0.68   22 Aug 2021 20:15    140    |  110    |  26     ----------------------------<  87     4.7     |  27     |  0.85     Ca    8.2        23 Aug 2021 06:55  Ca    8.4        22 Aug 2021 20:15    TPro  6.6    /  Alb  2.7    /  TBili  0.6    /  DBili  x      /  AST  16     /  ALT  23     /  AlkPhos  120    23 Aug 2021 06:55  TPro  7.2    /  Alb  2.9    /  TBili  0.5    /  DBili  x      /  AST  18     /  ALT  26     /  AlkPhos  132    22 Aug 2021 20:15    PT/INR - ( 22 Aug 2021 20:15 )   PT: 12.3 sec;   INR: 1.05 ratio         PTT - ( 22 Aug 2021 20:15 )  PTT:31.7 sec  CARDIAC MARKERS ( 22 Aug 2021 20:15 )  x     / x     / 46 U/L / x     / x          Blood Culture:     08-23 @ 06:55  TSH: 1.35      RADIOLOGY:    EKG:                      [facility]         Leonard Mercado, Shaquille, Deana, Delfina, Beau, Mitchell        [phone] (office)    CHIEF COMPLAINT: Patient is a 86y old  Male who presents with a chief complaint of left hip fracture (22 Aug 2021 22:57)      HPI:  86 M PMHX dementia, essential tremor, COPD not on home O2, severe scoliosis, HLD, hx of partial colon resection, here for mechanical fall at 18:30. Pt was walking from the bathroom to the kitchen and tripped. Occurred on carpeted floor, using his cane, landed on left side. No head trauma, no LOC. Pt denies palpitations, CP, SOB, dizziness, lightheadedness prior to fall. As per wife, pt has been his usual self. Pt has slow heart rate at rest, on propanolol for essential tremor. Patient is a poor historian and history is limited.    In the ED,  VS: Tmax 99.5 , bp 159/69, RR 18, 91% on RA. Labs significant for alk p 132.   CT C/A/P: Comminuted intertrochanteric fracture of the proximal left femur. Mucoid impacted airways with chronic aspiration in the right lower lobe. Chronic stable similar to prior imaging: simple hepatic cysts largest measuring 5.7 x 5.2 cm in size. Liver hemangioma 2.3 x 1.9 cm. 6 mm non-obstructing calculus in the lower pole of the left kidney and midpole of the left kidney. Diffuse bladder wall thickening of the of the urinary bladder most prominent at the posterior wall, and numerous bladder diverticulum again demonstrated. Small calcification at the posterior left bladder wall again demonstrated.  EKG sinus shanti rate 48 bpm, no ischemic changes.  (22 Aug 2021 22:57)    Interval HPI: Patient is a 86 year old male with PMH of dementia, essential tremor, COPD (not on home O2), severe scoliosis, HLD, hx of partial colon resection. He was BIBA s/p suspected mechanical fall; pt states he was walking from the bathroom to the kitchen when he tripped. Pt landed on his left side injuring his L rib cage and L proximal femur; pt denies head trauma, LOC. Pt seen and evaluated at bedside at 8:15 today. HR at 57 bpm at time of exam. Pt rates pain of left hip at 8/10 and denies any pain of L ribs. EKG HR 48 bpm, no ischemic changes. Pt denies palpitations, CP, SOB, dizziness, lightheadedness. Pt denies any cardiac Hx and reports he has never seen a Cardiologist. Pt takes propranolol for essential tremor. Of note, pt presented to the [place] ED in October 2018 when marked sinus bradycardia of 43 bpm was noted on EKG.         PAST MEDICAL & SURGICAL HISTORY:  Scoliosis    Chronic cough    Osteoporosis    Occasional tremors  severe essential tremors    Memory loss  dementia    COPD, severe    HLD (hyperlipidemia)    H/O partial resection of colon      SOCIAL HISTORY: No active tobacco, alcohol or illicit drug use    FAMILY HISTORY:  FHx: dementia (Mother)    FH: colon cancer (Father)    FH: Parkinson&#x27;s disease (Sibling)    FH: stroke (Sibling)        Outpatient medications:    MEDICATIONS  (STANDING):  albuterol/ipratropium for Nebulization 3 milliLiter(s) Nebulizer every 6 hours  cefTRIAXone   IVPB 1000 milliGRAM(s) IV Intermittent every 24 hours  cefTRIAXone   IVPB      donepezil 5 milliGRAM(s) Oral at bedtime  lactated ringers. 1000 milliLiter(s) (75 mL/Hr) IV Continuous <Continuous>  lactobacillus acidophilus 1 Tablet(s) Oral daily  latanoprost 0.005% Ophthalmic Solution 1 Drop(s) Both EYES at bedtime  primidone 50 milliGRAM(s) Oral two times a day  senna 2 Tablet(s) Oral at bedtime  tamsulosin 0.4 milliGRAM(s) Oral at bedtime  timolol 0.5% Solution 1 Drop(s) Both EYES daily  topiramate 25 milliGRAM(s) Oral two times a day    MEDICATIONS  (PRN):  morphine  - Injectable 2 milliGRAM(s) IV Push every 4 hours PRN Severe Pain (7 - 10)      Allergies    azithromycin (Rash)    Intolerances        REVIEW OF SYSTEMS:  CONSTITUTIONAL: denies fever, chills, fatigue, weakness  HEENT: denies blurred vision, sore throat  SKIN: denies new lesions, rash  CARDIOVASCULAR: denies chest pain, chest pressure, palpitations  RESPIRATORY: admits to shortness of breath and sputum production  GASTROINTESTINAL: denies nausea, vomiting, diarrhea, abdominal pain  GENITOURINARY: denies dysuria, discharge  NEUROLOGICAL: denies numbness, headache, focal weakness  MUSCULOSKELETAL: admits new joint pain, muscle aches, left leg pain  HEMATOLOGIC: denies gross bleeding, bruising  LYMPHATICS: denies enlarged lymph nodes, extremity swelling  PSYCHIATRIC: denies recent changes in anxiety, depression  ENDOCRINOLOGIC: denies sweating, cold or heat intolerance    VITAL SIGNS:   Vital Signs Last 24 Hrs  T(C): 36.7 (23 Aug 2021 07:23), Max: 37.5 (22 Aug 2021 20:11)  T(F): 98 (23 Aug 2021 07:23), Max: 99.5 (22 Aug 2021 20:11)  HR: 56 (23 Aug 2021 07:23) (44 - 103)  BP: 121/59 (23 Aug 2021 07:23) (121/59 - 169/73)  BP(mean): --  RR: 18 (23 Aug 2021 07:23) (17 - 18)  SpO2: 100% (23 Aug 2021 07:23) (91% - 100%)    I&O's Summary      PHYSICAL EXAM:  Constitutional: Well developed male who is laying in be d and is in NAD  Eyes:  EOMI, no oral cyanosis, conjunctivae clear, anicteric  Pulmonary: Non-labored, breath sounds with B/L crackles and congestion, no wheezing, rales  Cardiovascular: RRR, Normal S1 and S2,  No murmur.  No rubs, gallops or clicks  Gastrointestinal: Bowel Sounds present, soft, nontender  Lymph: Mild left LE peripheral edema   Neurological: Alert, strength and sensitivity are grossly intact  Skin: No obvious lesions/rashes  Psych:  Mood & affect appropriate    LABS: All Labs Reviewed:                        13.4   8.06  )-----------( 176      ( 23 Aug 2021 06:55 )             40.6                         14.6   6.75  )-----------( 233      ( 22 Aug 2021 20:15 )             44.8     23 Aug 2021 06:55    141    |  106    |  23     ----------------------------<  96     4.1     |  26     |  0.68   22 Aug 2021 20:15    140    |  110    |  26     ----------------------------<  87     4.7     |  27     |  0.85     Ca    8.2        23 Aug 2021 06:55  Ca    8.4        22 Aug 2021 20:15    TPro  6.6    /  Alb  2.7    /  TBili  0.6    /  DBili  x      /  AST  16     /  ALT  23     /  AlkPhos  120    23 Aug 2021 06:55  TPro  7.2    /  Alb  2.9    /  TBili  0.5    /  DBili  x      /  AST  18     /  ALT  26     /  AlkPhos  132    22 Aug 2021 20:15    PT/INR - ( 22 Aug 2021 20:15 )   PT: 12.3 sec;   INR: 1.05 ratio         PTT - ( 22 Aug 2021 20:15 )  PTT:31.7 sec  CARDIAC MARKERS ( 22 Aug 2021 20:15 )  x     / x     / 46 U/L / x     / x          Blood Culture:     08-23 @ 06:55  TSH: 1.35      RADIOLOGY:    EKG:

## 2021-08-23 NOTE — PROGRESS NOTE ADULT - ASSESSMENT
87 y/o M PMHx of dementia, essential tremor, COPD not on home O2, severe scoliosis, HLD, hx of partial colon resection, admit for comminuted intertrochanteric fracture of the proximal left femur s/p suspected mechanical fall.

## 2021-08-23 NOTE — CONSULT NOTE ADULT - PROBLEM SELECTOR PROBLEM 2
Patient confirms she is not breast feeding. Advised script was sent to [facility] per request. Patient is agreeable and verbalizes complete understanding.  
Fall, subsequent encounter
Sinus bradycardia

## 2021-08-23 NOTE — CONSULT NOTE ADULT - SUBJECTIVE AND OBJECTIVE BOX
PULMONARY/CRITICAL CARE  CLEARANCE      Patient is a 86y old  Male who presents with a chief complaint of left hip fracture (23 Aug 2021 08:25)    BRIEF HOSPITAL COURSE: ***86 M PMHX dementia, essential tremor, COPD not on home O2, severe scoliosis, HLD, hx of partial colon resection, here for mechanical fall at 18:30. Pt was walking from the bathroom to the kitchen and tripped. Occurred on carpeted floor, using his cane, landed on left side. No head trauma, no LOC. Pt denies palpitations, CP, SOB, dizziness, lightheadedness prior to fall. As per wife, pt has been his usual self. Pt has slow heart rate at rest, on propanolol for essential tremor. Patient is a poor historian and history is limited.    In the ED,  VS: Tmax 99.5 , bp 159/69, RR 18, 91% on RA. Labs significant for alk p 132.   CT C/A/P: Comminuted intertrochanteric fracture of the proximal left femur. Mucoid impacted airways with chronic aspiration in the right lower lobe. Chronic stable similar to prior imaging: simple hepatic cysts largest measuring 5.7 x 5.2 cm in size. Liver hemangioma 2.3 x 1.9 cm. 6 mm non-obstructing calculus in the lower pole of the left kidney and midpole of the left kidney. Diffuse bladder wall thickening of the of the urinary bladder most prominent at the posterior wall, and numerous bladder diverticulum again demonstrated. Small calcification at the posterior left bladder wall again demonstrated.  EKG sinus shanti rate 48 bpm, no ischemic changes.  (22 Aug 2021 22:57)    Interval HPI: Patient is a 86 year old male with PMH of dementia, essential tremor, COPD (not on home O2), severe scoliosis, HLD, hx of partial colon resection. He was BIBA s/p suspected mechanical fall; pt states he was walking from the bathroom to the kitchen when he tripped. Pt landed on his left side injuring his L rib cage and L proximal femur; pt denies head trauma, LOC. Pt seen and evaluated at bedside at 8:15 today. HR at 57 bpm at time of exam. Pt rates pain of left hip at 8/10 and denies any pain of L ribs. EKG HR 48 bpm, no ischemic changes. Pt denies palpitations, CP, SOB, dizziness, lightheadedness. Pt denies any cardiac Hx and reports he has never seen a Cardiologist. Pt takes propranolol for essential tremor. Of note, pt presented to the [facility] ED in 2018 when marked sinus bradycardia of 43 bpm was noted on EKG.       Events last 24 hours: ***    PAST MEDICAL & SURGICAL HISTORY:  Scoliosis    Chronic cough    Osteoporosis    Occasional tremors  severe essential tremors    Memory loss  dementia    COPD, severe    HLD (hyperlipidemia)    H/O partial resection of colon      Allergies    azithromycin (Rash)    Intolerances      FAMILY HISTORY: distant smoker, no etoh  FHx: dementia (Mother)    FH: colon cancer (Father)    FH: Parkinson&#x27;s disease (Sibling)    FH: stroke (Sibling)        Review of Systems:  CONSTITUTIONAL: No fever, chills, or fatigue  EYES: No eye pain, visual disturbances, or discharge  ENMT:  No difficulty hearing, tinnitus, vertigo; No sinus or throat pain  NECK: No pain or stiffness  RESPIRATORY: No cough, wheezing, chills or hemoptysis; No shortness of breath  CARDIOVASCULAR: No chest pain, palpitations, dizziness, or leg swelling  GASTROINTESTINAL: No abdominal or epigastric pain. No nausea, vomiting, or hematemesis; No diarrhea or constipation. No melena or hematochezia.  GENITOURINARY: No dysuria, frequency, hematuria, or incontinence  NEUROLOGICAL: No headaches, memory loss, loss of strength, numbness, or tremors  SKIN: No itching, burning, rashes, or lesions   MUSCULOSKELETAL: left hip extremity pain  PSYCHIATRIC: No depression, anxiety, mood swings, or difficulty sleeping      Medications:  cefTRIAXone   IVPB 1000 milliGRAM(s) IV Intermittent every 24 hours  cefTRIAXone   IVPB        tamsulosin 0.4 milliGRAM(s) Oral at bedtime    albuterol/ipratropium for Nebulization 3 milliLiter(s) Nebulizer every 6 hours    donepezil 5 milliGRAM(s) Oral at bedtime  morphine  - Injectable 2 milliGRAM(s) IV Push every 4 hours PRN  primidone 50 milliGRAM(s) Oral two times a day  topiramate 25 milliGRAM(s) Oral two times a day        senna 2 Tablet(s) Oral at bedtime        lactated ringers. 1000 milliLiter(s) IV Continuous <Continuous>      latanoprost 0.005% Ophthalmic Solution 1 Drop(s) Both EYES at bedtime  timolol 0.5% Solution 1 Drop(s) Both EYES daily    lactobacillus acidophilus 1 Tablet(s) Oral daily          ICU Vital Signs Last 24 Hrs  T(C): 36.7 (23 Aug 2021 07:23), Max: 37.5 (22 Aug 2021 20:11)  T(F): 98 (23 Aug 2021 07:23), Max: 99.5 (22 Aug 2021 20:11)  HR: 56 (23 Aug 2021 07:23) (44 - 103)  BP: 121/59 (23 Aug 2021 07:23) (121/59 - 169/73)  BP(mean): --  ABP: --  ABP(mean): --  RR: 18 (23 Aug 2021 07:23) (17 - 18)  SpO2: 100% (23 Aug 2021 07:23) (91% - 100%)    Vital Signs Last 24 Hrs  T(C): 36.7 (23 Aug 2021 07:23), Max: 37.5 (22 Aug 2021 20:11)  T(F): 98 (23 Aug 2021 07:23), Max: 99.5 (22 Aug 2021 20:11)  HR: 56 (23 Aug 2021 07:23) (44 - 103)  BP: 121/59 (23 Aug 2021 07:23) (121/59 - 169/73)  BP(mean): --  RR: 18 (23 Aug 2021 07:23) (17 - 18)  SpO2: 100% (23 Aug 2021 07:23) (91% - 100%)        I&O's Detail        LABS:                        13.4   8.06  )-----------( 176      ( 23 Aug 2021 06:55 )             40.6     08-23    141  |  106  |  23  ----------------------------<  96  4.1   |  26  |  0.68    Ca    8.2<L>      23 Aug 2021 06:55    TPro  6.6  /  Alb  2.7<L>  /  TBili  0.6  /  DBili  x   /  AST  16  /  ALT  23  /  AlkPhos  120  08-23      CARDIAC MARKERS ( 22 Aug 2021 20:15 )  x     / x     / 46 U/L / x     / x          CAPILLARY BLOOD GLUCOSE        PT/INR - ( 23 Aug 2021 08:41 )   PT: 12.4 sec;   INR: 1.06 ratio         PTT - ( 23 Aug 2021 08:41 )  PTT:30.9 sec  Urinalysis Basic - ( 22 Aug 2021 23:19 )    Color: Yellow / Appearance: Slightly Turbid / S.010 / pH: x  Gluc: x / Ketone: Small  / Bili: Negative / Urobili: Negative   Blood: x / Protein: 30 mg/dL / Nitrite: Positive   Leuk Esterase: Moderate / RBC: 3-5 /HPF / WBC 26-50   Sq Epi: x / Non Sq Epi: Occasional / Bacteria: TNTC      CULTURES:      Physical Examination:    General: No acute distress.  elderly male lying uncomfortably in bed    HEENT: Pupils equal, reactive to light.  Symmetric.    PULM: few rhonchi bilat good excursion no change percussion    CVS: Regular rate and rhythm, no murmurs, rubs, or gallops    ABD: Soft, nondistended, nontender, normoactive bowel sounds, no masses    EXT: No edema, tender left hip    SKIN: Warm and well perfused, no rashes noted.    NEURO: Alert, moves all ext, confused, interactive, nonfocal    RADIOLOGY: ***< from: CT Chest w/ IV Cont (21 @ 21:33) >  EXAM:  CT ABDOMEN AND PELVIS IC                          EXAM:  CT CHEST IC                            PROCEDURE DATE:  2021          INTERPRETATION:  CLINICAL INFORMATION: Abdominal trauma    COMPARISON: Comparison to numerous prior examinations most recent on 2021    CONTRAST/COMPLICATIONS:  IV Contrast: Omnipaque 350 (accession [id number]), IV contrast documented in associated exam (accession [id number])  90 cc administered (accession [id number]), 0 cc administered (accession [id number]) 10 cc discarded (accession [id number]), 0 cc discarded (accession [id number])  Oral Contrast: NONE  Complications: None reported at time of study completion    PROCEDURE:  CT of the Chest, Abdomen and Pelvis was performed.  Sagittal and coronal reformats were performed.    FINDINGS:  CHEST:  LUNGS AND LARGE AIRWAYS: Bronchial thickening the bronchus intermedius extending into the right lower lobe bronchus. Secretions identified at the lower trachea and left mainstem bronchus. Centrilobular emphysema. Mucoid impacted airways in the right lower lobe. Dependent subsegmental atelectasis in both lobes.  PLEURA: No pleural effusion.  VESSELS: Atherosclerotic changes of the aorta and coronary arteries. Aberrant left subclavian artery.  HEART: Heart size is normal. No pericardial effusion.  MEDIASTINUM AND EMMA: No lymphadenopathy.  CHEST WALL AND LOWER NECK: Within normal limits.    ABDOMEN AND PELVIS:  LIVER: Multiple simple hepatic cysts largest measuring 5.7 x 5.2 cm in size. Heterogeneous hypoattenuating lesion is again demonstrated at the inferior aspect of segment VI measuring 2.3 x 1.9 cm, stable from prior examination likely representing hemangioma.  BILE DUCTS: Normal caliber.  GALLBLADDER: Within normal limits.  SPLEEN: Within normal limits.  PANCREAS: Within normal limits.  ADRENALS: Within normal limits.  KIDNEYS/URETERS: Symmetrical enhancement without hydronephrosis. 6 mm nonobstructing calculus in the lower pole of the left kidney. 6 mm nonobstructing calculus in the midpole of the left kidney.    BLADDER: Diffuse wall thickening of the of the urinary bladder most prominent at the posterior wall, and numerous bladder diverticulum again demonstrated. Small calcification at the posterior left bladder wall again demonstrated.  REPRODUCTIVE ORGANS: Fiducial markers versus surgical anchors identified within the prostate.    BOWEL: No bowel obstruction. Appendix is not visualized. No evidence of inflammation in the pericecal region.There is diverticulosis without evidence of diverticulitis.  PERITONEUM: No ascites.  VESSELS: Atherosclerotic changes.  RETROPERITONEUM/LYMPH NODES: No lymphadenopathy.  ABDOMINAL WALL: Within normal limits.  BONES: Comminuted intertrochanteric fracture of the proximal left femur. Degenerative changes and severe levoscoliosis.    IMPRESSION:    Comminuted intertrochanteric fracture of the proximal left femur.    Mucoid impacted airways with chronic aspiration in the right lower lobe.    Other chronic ancillary findings as above without change from prior exams.        --- End of Report ---            KENY FARLEY   This document has been electronically signed. Aug 22 2021 10:22PM    < end of copied text >      CRITICAL CARE TIME SPENT: ***

## 2021-08-23 NOTE — CONSULT NOTE ADULT - ASSESSMENT
86M s/p fall and subsequent L intertrochanteric femur fx now s/p IM nailing    Functionally patient previously independent however given his multiple medical comorbidities, dementia, essential tremor, patient likely will be a subacute candidate. Will follow and reassess after formal PT evaluation.  Postoperative pain recommend a standing tylenol schedule 500mg TID, oxycodone 2.5mg PRN Q6-Q8H, lidocaine patch flanking surgical site  Cryotherapy, Incentive spirometry  Aspiration precautions, SLP recommended puree and thin liquids  Spoke with patients wife Felicia regarding potential rehabilitation settings, factors influencing favorable rehabilitation outcomes. She is understanding that he will likely require an inpatient rehabilitation setting following discharge.

## 2021-08-23 NOTE — SWALLOW BEDSIDE ASSESSMENT ADULT - SLP PATIENT PROFILE REVIEW
Patient called and stated she has an appointment with Darcy Casanova tomorrow and wants to know if she can possible see [facility] after her visit.
yes

## 2021-08-23 NOTE — PROGRESS NOTE ADULT - SUBJECTIVE AND OBJECTIVE BOX
Patient is a 86y old  Male who presents with a chief complaint of left hip fracture (23 Aug 2021 10:09)      INTERVAL /OVERNIGHT EVENTS: alert awake confused    MEDICATIONS  (STANDING):  albuterol/ipratropium for Nebulization 3 milliLiter(s) Nebulizer every 6 hours  budesonide 160 MICROgram(s)/formoterol 4.5 MICROgram(s) Inhaler 2 Puff(s) Inhalation two times a day  cefTRIAXone   IVPB 1000 milliGRAM(s) IV Intermittent every 24 hours  cefTRIAXone   IVPB      lactated ringers. 1000 milliLiter(s) (75 mL/Hr) IV Continuous <Continuous>  lactobacillus acidophilus 1 Tablet(s) Oral daily  latanoprost 0.005% Ophthalmic Solution 1 Drop(s) Both EYES at bedtime  primidone 50 milliGRAM(s) Oral two times a day  senna 2 Tablet(s) Oral at bedtime  tamsulosin 0.4 milliGRAM(s) Oral at bedtime  timolol 0.5% Solution 1 Drop(s) Both EYES daily  tiotropium 18 MICROgram(s) Capsule 1 Capsule(s) Inhalation daily  topiramate 25 milliGRAM(s) Oral two times a day    MEDICATIONS  (PRN):  morphine  - Injectable 2 milliGRAM(s) IV Push every 4 hours PRN Severe Pain (7 - 10)  oxyCODONE    IR 5 milliGRAM(s) Oral four times a day PRN Moderate Pain (4 - 6)  traMADol 50 milliGRAM(s) Oral four times a day PRN Severe Pain (7 - 10)      Allergies    azithromycin (Rash)    Intolerances        REVIEW OF SYSTEMS:  denies    Vital Signs Last 24 Hrs  T(C): 36.6 (23 Aug 2021 12:00), Max: 37.5 (22 Aug 2021 20:11)  T(F): 97.9 (23 Aug 2021 12:00), Max: 99.5 (22 Aug 2021 20:11)  HR: 57 (23 Aug 2021 10:00) (44 - 103)  BP: 152/66 (23 Aug 2021 12:00) (121/59 - 169/73)  BP(mean): --  RR: 19 (23 Aug 2021 12:00) (17 - 19)  SpO2: 92% (23 Aug 2021 12:00) (91% - 100%)    PHYSICAL EXAM:  GENERAL: NAD, well-groomed, well-developed  HEAD:  Atraumatic, Normocephalic  EYES: EOMI, PERRLA, conjunctiva and sclera clear  ENMT: No tonsillar erythema, exudates, or enlargement; Moist mucous membranes, Good dentition, No lesions  NECK: Supple, No JVD, Normal thyroid  NERVOUS SYSTEM:  Alert & awake; Motor Strength 5/5 B/L upper and lower extremities; DTRs 2+ intact and symmetric  CHEST/LUNG: Clear to auscultation bilaterally; No rales, rhonchi, wheezing, or rubs  HEART: Regular rate and rhythm; No murmurs, rubs, or gallops  ABDOMEN: Soft, Nontender, Nondistended; Bowel sounds present  EXTREMITIES:  2+ Peripheral Pulses, No clubbing, cyanosis, or edema  LYMPH: No lymphadenopathy noted  SKIN: No rashes or lesions    LABS:                        13.4   8.06  )-----------( 176      ( 23 Aug 2021 06:55 )             40.6     23 Aug 2021 06:55    141    |  106    |  23     ----------------------------<  96     4.1     |  26     |  0.68     Ca    8.2        23 Aug 2021 06:55    TPro  6.6    /  Alb  2.7    /  TBili  0.6    /  DBili  x      /  AST  16     /  ALT  23     /  AlkPhos  120    23 Aug 2021 06:55    PT/INR - ( 23 Aug 2021 08:41 )   PT: 12.4 sec;   INR: 1.06 ratio         PTT - ( 23 Aug 2021 08:41 )  PTT:30.9 sec  Urinalysis Basic - ( 22 Aug 2021 23:19 )    Color: Yellow / Appearance: Slightly Turbid / S.010 / pH: x  Gluc: x / Ketone: Small  / Bili: Negative / Urobili: Negative   Blood: x / Protein: 30 mg/dL / Nitrite: Positive   Leuk Esterase: Moderate / RBC: 3-5 /HPF / WBC 26-50   Sq Epi: x / Non Sq Epi: Occasional / Bacteria: TNTC      CAPILLARY BLOOD GLUCOSE          RADIOLOGY & ADDITIONAL TESTS:    Notes Reviewed:  [x ] YES  [ ] NO    Care Discussed with Consultants/Other Providers [ x] YES  [ ] NO

## 2021-08-24 ENCOUNTER — TRANSCRIPTION ENCOUNTER (OUTPATIENT)
Age: 86
End: 2021-08-24

## 2021-08-24 DIAGNOSIS — F03.90 UNSPECIFIED DEMENTIA WITHOUT BEHAVIORAL DISTURBANCE: ICD-10-CM

## 2021-08-24 LAB
AMMONIA BLD-MCNC: 38 UMOL/L — HIGH (ref 11–32)
ANION GAP SERPL CALC-SCNC: 8 MMOL/L — SIGNIFICANT CHANGE UP (ref 5–17)
BUN SERPL-MCNC: 19 MG/DL — SIGNIFICANT CHANGE UP (ref 7–23)
CALCIUM SERPL-MCNC: 8.2 MG/DL — LOW (ref 8.5–10.1)
CHLORIDE SERPL-SCNC: 103 MMOL/L — SIGNIFICANT CHANGE UP (ref 96–108)
CO2 SERPL-SCNC: 28 MMOL/L — SIGNIFICANT CHANGE UP (ref 22–31)
COVID-19 SPIKE DOMAIN AB INTERP: POSITIVE
COVID-19 SPIKE DOMAIN ANTIBODY RESULT: 109 U/ML — HIGH
CREAT SERPL-MCNC: 0.82 MG/DL — SIGNIFICANT CHANGE UP (ref 0.5–1.3)
GLUCOSE SERPL-MCNC: 120 MG/DL — HIGH (ref 70–99)
HCT VFR BLD CALC: 38.1 % — LOW (ref 39–50)
HGB BLD-MCNC: 12.6 G/DL — LOW (ref 13–17)
MAGNESIUM SERPL-MCNC: 1.8 MG/DL — SIGNIFICANT CHANGE UP (ref 1.6–2.6)
MCHC RBC-ENTMCNC: 30 PG — SIGNIFICANT CHANGE UP (ref 27–34)
MCHC RBC-ENTMCNC: 33.1 GM/DL — SIGNIFICANT CHANGE UP (ref 32–36)
MCV RBC AUTO: 90.7 FL — SIGNIFICANT CHANGE UP (ref 80–100)
NRBC # BLD: 0 /100 WBCS — SIGNIFICANT CHANGE UP (ref 0–0)
PHOSPHATE SERPL-MCNC: 2.6 MG/DL — SIGNIFICANT CHANGE UP (ref 2.5–4.5)
PLATELET # BLD AUTO: 179 K/UL — SIGNIFICANT CHANGE UP (ref 150–400)
POTASSIUM SERPL-MCNC: 3.8 MMOL/L — SIGNIFICANT CHANGE UP (ref 3.5–5.3)
POTASSIUM SERPL-SCNC: 3.8 MMOL/L — SIGNIFICANT CHANGE UP (ref 3.5–5.3)
RBC # BLD: 4.2 M/UL — SIGNIFICANT CHANGE UP (ref 4.2–5.8)
RBC # FLD: 13.2 % — SIGNIFICANT CHANGE UP (ref 10.3–14.5)
SARS-COV-2 IGG+IGM SERPL QL IA: 109 U/ML — HIGH
SARS-COV-2 IGG+IGM SERPL QL IA: POSITIVE
SODIUM SERPL-SCNC: 139 MMOL/L — SIGNIFICANT CHANGE UP (ref 135–145)
WBC # BLD: 11.33 K/UL — HIGH (ref 3.8–10.5)
WBC # FLD AUTO: 11.33 K/UL — HIGH (ref 3.8–10.5)

## 2021-08-24 PROCEDURE — 99233 SBSQ HOSP IP/OBS HIGH 50: CPT

## 2021-08-24 PROCEDURE — 93010 ELECTROCARDIOGRAM REPORT: CPT

## 2021-08-24 PROCEDURE — 71045 X-RAY EXAM CHEST 1 VIEW: CPT | Mod: 26

## 2021-08-24 RX ORDER — PIPERACILLIN AND TAZOBACTAM 4; .5 G/20ML; G/20ML
3.38 INJECTION, POWDER, LYOPHILIZED, FOR SOLUTION INTRAVENOUS EVERY 8 HOURS
Refills: 0 | Status: DISCONTINUED | OUTPATIENT
Start: 2021-08-25 | End: 2021-08-25

## 2021-08-24 RX ORDER — LANOLIN ALCOHOL/MO/W.PET/CERES
3 CREAM (GRAM) TOPICAL AT BEDTIME
Refills: 0 | Status: DISCONTINUED | OUTPATIENT
Start: 2021-08-24 | End: 2021-08-24

## 2021-08-24 RX ORDER — FUROSEMIDE 40 MG
40 TABLET ORAL ONCE
Refills: 0 | Status: COMPLETED | OUTPATIENT
Start: 2021-08-24 | End: 2021-08-24

## 2021-08-24 RX ORDER — LANOLIN ALCOHOL/MO/W.PET/CERES
3 CREAM (GRAM) TOPICAL ONCE
Refills: 0 | Status: COMPLETED | OUTPATIENT
Start: 2021-08-24 | End: 2021-08-24

## 2021-08-24 RX ORDER — PIPERACILLIN AND TAZOBACTAM 4; .5 G/20ML; G/20ML
3.38 INJECTION, POWDER, LYOPHILIZED, FOR SOLUTION INTRAVENOUS ONCE
Refills: 0 | Status: COMPLETED | OUTPATIENT
Start: 2021-08-24 | End: 2021-08-24

## 2021-08-24 RX ADMIN — Medication 500 MILLIGRAM(S): at 17:47

## 2021-08-24 RX ADMIN — Medication 100 MILLIGRAM(S): at 13:08

## 2021-08-24 RX ADMIN — BUDESONIDE AND FORMOTEROL FUMARATE DIHYDRATE 2 PUFF(S): 160; 4.5 AEROSOL RESPIRATORY (INHALATION) at 06:42

## 2021-08-24 RX ADMIN — OXYCODONE HYDROCHLORIDE 10 MILLIGRAM(S): 5 TABLET ORAL at 06:42

## 2021-08-24 RX ADMIN — TIOTROPIUM BROMIDE 1 CAPSULE(S): 18 CAPSULE ORAL; RESPIRATORY (INHALATION) at 06:42

## 2021-08-24 RX ADMIN — PRIMIDONE 50 MILLIGRAM(S): 250 TABLET ORAL at 17:47

## 2021-08-24 RX ADMIN — Medication 25 MILLIGRAM(S): at 06:42

## 2021-08-24 RX ADMIN — Medication 3 MILLIGRAM(S): at 21:38

## 2021-08-24 RX ADMIN — Medication 1 TABLET(S): at 11:00

## 2021-08-24 RX ADMIN — PIPERACILLIN AND TAZOBACTAM 200 GRAM(S): 4; .5 INJECTION, POWDER, LYOPHILIZED, FOR SOLUTION INTRAVENOUS at 21:38

## 2021-08-24 RX ADMIN — Medication 500 MILLIGRAM(S): at 06:42

## 2021-08-24 RX ADMIN — BUDESONIDE AND FORMOTEROL FUMARATE DIHYDRATE 2 PUFF(S): 160; 4.5 AEROSOL RESPIRATORY (INHALATION) at 17:57

## 2021-08-24 RX ADMIN — Medication 1 DROP(S): at 11:00

## 2021-08-24 RX ADMIN — PANTOPRAZOLE SODIUM 40 MILLIGRAM(S): 20 TABLET, DELAYED RELEASE ORAL at 06:42

## 2021-08-24 RX ADMIN — Medication 40 MILLIGRAM(S): at 09:25

## 2021-08-24 RX ADMIN — OXYCODONE HYDROCHLORIDE 10 MILLIGRAM(S): 5 TABLET ORAL at 14:45

## 2021-08-24 RX ADMIN — Medication 1 MILLIGRAM(S): at 11:00

## 2021-08-24 RX ADMIN — PRIMIDONE 50 MILLIGRAM(S): 250 TABLET ORAL at 06:42

## 2021-08-24 RX ADMIN — Medication 25 MILLIGRAM(S): at 17:47

## 2021-08-24 RX ADMIN — OXYCODONE HYDROCHLORIDE 10 MILLIGRAM(S): 5 TABLET ORAL at 13:47

## 2021-08-24 RX ADMIN — Medication 100 MILLIGRAM(S): at 06:43

## 2021-08-24 RX ADMIN — SENNA PLUS 2 TABLET(S): 8.6 TABLET ORAL at 21:39

## 2021-08-24 RX ADMIN — TAMSULOSIN HYDROCHLORIDE 0.4 MILLIGRAM(S): 0.4 CAPSULE ORAL at 21:39

## 2021-08-24 RX ADMIN — RIVAROXABAN 10 MILLIGRAM(S): KIT at 11:00

## 2021-08-24 RX ADMIN — LATANOPROST 1 DROP(S): 0.05 SOLUTION/ DROPS OPHTHALMIC; TOPICAL at 21:39

## 2021-08-24 NOTE — DISCHARGE NOTE PROVIDER - CARE PROVIDER_API CALL
Carlos Henriquez  ORTHOPAEDIC SURGERY  [address] [place] [address]  Phone: [phone]  Fax: [phone]  Follow Up Time:    Carlos Henriquez  ORTHOPAEDIC SURGERY  [address] [place] [address]  Phone: [phone]  Fax: [phone]  Follow Up Time:     Felipe Nichols)  Internal Medicine  [address] [place] [address]  Phone: [phone]  Fax: [phone]  Follow Up Time:     Tone Bolton)  Critical Care Medicine; Internal Medicine; Pulmonary Disease  [address] [place] [address]  Phone: [phone]  Fax: [phone]  Follow Up Time:

## 2021-08-24 NOTE — PROGRESS NOTE ADULT - SUBJECTIVE AND OBJECTIVE BOX
The patient was interviewed and evaluated  86y Male s/p left hip orif    T(C): 36.9 (08-24-21 @ 10:29), Max: 37.1 (08-23-21 @ 12:00)  HR: 86 (08-24-21 @ 10:29) (60 - 108)  BP: 117/68 (08-24-21 @ 10:29) (100/74 - 137/57)  RR: 18 (08-24-21 @ 10:29) (10 - 20)  SpO2: 90% (08-24-21 @ 10:29) (90% - 100%)  Wt(kg): --    Pt seen, no anesthesia complications or complaints noted or reported.   No Nausea and pain controlled.   Had RRT called this am for tachycardia and hypotension which spontaneously broke; further workup per primary team

## 2021-08-24 NOTE — OCCUPATIONAL THERAPY INITIAL EVALUATION ADULT - ADDITIONAL COMMENTS
pt is a poor historian, h/o dementia. Pt is a poor historian, h/o dementia. Per care coordinator note, pt lives with spouse in a townhouse with no steps. Pt was independent with ADLs and functional mobility without AD PTA.

## 2021-08-24 NOTE — PROGRESS NOTE ADULT - ASSESSMENT
86 year old male with PMH of dementia, essential tremor, COPD (not on home O2), severe scoliosis, HLD, hx of partial colon resection. He was BIBA s/p suspected mechanical fall and was found to have left proximal femur fracture, is being evaluated for Sinus shanti and surgical clearance    SVT  -  S/P mechanical fall and was found to have left proxima femur fracture  - Admission EKG shows sinus Shanti with HR of 48 with no ischemic changes.  - this am with SVT spontaneously broke   -please resume home propanolol 80mg Po BID  -continue tele monitoring   - ECHO: preliminary read shows very limited acoustic window, technically difficult study, fu offical read   -diffuse rhonchi on exam , check chest xray , bnp, consider speech and swallow eval ? aspiration PNA  -give lasix 40mg IV today and reassess daily     Ortho  -post op intramedullary nailing of femur  -fu ortho recs     Monitor and replete electrolytes. Keep K>4.0 and Mg>2.0.  Brandie Burkett FNP-C  Cardiology NP  [id number] [phone]

## 2021-08-24 NOTE — OCCUPATIONAL THERAPY INITIAL EVALUATION ADULT - ADL RETRAINING, OT EVAL
Patient will dress upper body with minimal assistance within 1 week. Patient will dress lower body with maximal assistance, AE as needed within 1 week.

## 2021-08-24 NOTE — DISCHARGE NOTE PROVIDER - HOSPITAL COURSE
admitted for fall with hip fracture  underwent IMN  post op hypoxia - likely copd exacerbation  periop tachycardia - beta blocker dose adjusted  Dc after ORTHO clearance

## 2021-08-24 NOTE — PROGRESS NOTE ADULT - SUBJECTIVE AND OBJECTIVE BOX
PULMONARY/CRITICAL CARE    DOS 21  Tolerated surgery. Denies sob. Confused.   Patient is a 86y old  Male who presents with a chief complaint of left hip fracture (23 Aug 2021 08:25)    BRIEF HOSPITAL COURSE: ***86 M PMHX dementia, essential tremor, COPD not on home O2, severe scoliosis, HLD, hx of partial colon resection, here for mechanical fall at 18:30. Pt was walking from the bathroom to the kitchen and tripped. Occurred on carpeted floor, using his cane, landed on left side. No head trauma, no LOC. Pt denies palpitations, CP, SOB, dizziness, lightheadedness prior to fall. As per wife, pt has been his usual self. Pt has slow heart rate at rest, on propanolol for essential tremor. Patient is a poor historian and history is limited.    In the ED,  VS: Tmax 99.5 , bp 159/69, RR 18, 91% on RA. Labs significant for alk p 132.   CT C/A/P: Comminuted intertrochanteric fracture of the proximal left femur. Mucoid impacted airways with chronic aspiration in the right lower lobe. Chronic stable similar to prior imaging: simple hepatic cysts largest measuring 5.7 x 5.2 cm in size. Liver hemangioma 2.3 x 1.9 cm. 6 mm non-obstructing calculus in the lower pole of the left kidney and midpole of the left kidney. Diffuse bladder wall thickening of the of the urinary bladder most prominent at the posterior wall, and numerous bladder diverticulum again demonstrated. Small calcification at the posterior left bladder wall again demonstrated.  EKG sinus shanti rate 48 bpm, no ischemic changes.  (22 Aug 2021 22:57)    Interval HPI: Patient is a 86 year old male with PMH of dementia, essential tremor, COPD (not on home O2), severe scoliosis, HLD, hx of partial colon resection. He was BIBA s/p suspected mechanical fall; pt states he was walking from the bathroom to the kitchen when he tripped. Pt landed on his left side injuring his L rib cage and L proximal femur; pt denies head trauma, LOC. Pt seen and evaluated at bedside at 8:15 today. HR at 57 bpm at time of exam. Pt rates pain of left hip at 8/10 and denies any pain of L ribs. EKG HR 48 bpm, no ischemic changes. Pt denies palpitations, CP, SOB, dizziness, lightheadedness. Pt denies any cardiac Hx and reports he has never seen a Cardiologist. Pt takes propranolol for essential tremor. Of note, pt presented to the [facility] ED in 2018 when marked sinus bradycardia of 43 bpm was noted on EKG.       Events last 24 hours: ***    PAST MEDICAL & SURGICAL HISTORY:  Scoliosis    Chronic cough    Osteoporosis    Occasional tremors  severe essential tremors    Memory loss  dementia    COPD, severe    HLD (hyperlipidemia)    H/O partial resection of colon      Allergies    azithromycin (Rash)    Intolerances      FAMILY HISTORY: distant smoker, no etoh  FHx: dementia (Mother)    FH: colon cancer (Father)    FH: Parkinson&#x27;s disease (Sibling)    FH: stroke (Sibling)          Medications:  cefTRIAXone   IVPB 1000 milliGRAM(s) IV Intermittent every 24 hours  cefTRIAXone   IVPB        tamsulosin 0.4 milliGRAM(s) Oral at bedtime    albuterol/ipratropium for Nebulization 3 milliLiter(s) Nebulizer every 6 hours    donepezil 5 milliGRAM(s) Oral at bedtime  morphine  - Injectable 2 milliGRAM(s) IV Push every 4 hours PRN  primidone 50 milliGRAM(s) Oral two times a day  topiramate 25 milliGRAM(s) Oral two times a day        senna 2 Tablet(s) Oral at bedtime        lactated ringers. 1000 milliLiter(s) IV Continuous <Continuous>      latanoprost 0.005% Ophthalmic Solution 1 Drop(s) Both EYES at bedtime  timolol 0.5% Solution 1 Drop(s) Both EYES daily    lactobacillus acidophilus 1 Tablet(s) Oral daily          ICU Vital Signs Last 24 Hrs  T(C): 36.7 (23 Aug 2021 07:23), Max: 37.5 (22 Aug 2021 20:11)  T(F): 98 (23 Aug 2021 07:23), Max: 99.5 (22 Aug 2021 20:11)  HR: 56 (23 Aug 2021 07:23) (44 - 103)  BP: 121/59 (23 Aug 2021 07:23) (121/59 - 169/73)  BP(mean): --  ABP: --  ABP(mean): --  RR: 18 (23 Aug 2021 07:23) (17 - 18)  SpO2: 100% (23 Aug 2021 07:23) (91% - 100%)    Vital Signs Last 24 Hrs  T(C): 36.7 (23 Aug 2021 07:23), Max: 37.5 (22 Aug 2021 20:11)  T(F): 98 (23 Aug 2021 07:23), Max: 99.5 (22 Aug 2021 20:11)  HR: 56 (23 Aug 2021 07:23) (44 - 103)  BP: 121/59 (23 Aug 2021 07:23) (121/59 - 169/73)  BP(mean): --  RR: 18 (23 Aug 2021 07:23) (17 - 18)  SpO2: 100% (23 Aug 2021 07:23) (91% - 100%)        I&O's Detail        LABS:                        13.4   8.06  )-----------( 176      ( 23 Aug 2021 06:55 )             40.6     08-23    141  |  106  |  23  ----------------------------<  96  4.1   |  26  |  0.68    Ca    8.2<L>      23 Aug 2021 06:55    TPro  6.6  /  Alb  2.7<L>  /  TBili  0.6  /  DBili  x   /  AST  16  /  ALT  23  /  AlkPhos  120  08-23      CARDIAC MARKERS ( 22 Aug 2021 20:15 )  x     / x     / 46 U/L / x     / x          CAPILLARY BLOOD GLUCOSE        PT/INR - ( 23 Aug 2021 08:41 )   PT: 12.4 sec;   INR: 1.06 ratio         PTT - ( 23 Aug 2021 08:41 )  PTT:30.9 sec  Urinalysis Basic - ( 22 Aug 2021 23:19 )    Color: Yellow / Appearance: Slightly Turbid / S.010 / pH: x  Gluc: x / Ketone: Small  / Bili: Negative / Urobili: Negative   Blood: x / Protein: 30 mg/dL / Nitrite: Positive   Leuk Esterase: Moderate / RBC: 3-5 /HPF / WBC 26-50   Sq Epi: x / Non Sq Epi: Occasional / Bacteria: TNTC      CULTURES:      Physical Examination:    General: No acute distress.  elderly male lying uncomfortably in bed    HEENT: Pupils equal, reactive to light.  Symmetric.    PULM: few rhonchi bilat good excursion no change percussion    CVS: Regular rate and rhythm, no murmurs, rubs, or gallops    ABD: Soft, nondistended, nontender, normoactive bowel sounds, no masses    EXT: No edema, tender left hip/ bandaged    SKIN: Warm and well perfused, no rashes noted.    NEURO: Alert, moves all ext, confused, interactive, nonfocal    RADIOLOGY: ***< from: CT Chest w/ IV Cont (21 @ 21:33) >  EXAM:  CT ABDOMEN AND PELVIS IC                          EXAM:  CT CHEST IC                            PROCEDURE DATE:  2021          INTERPRETATION:  CLINICAL INFORMATION: Abdominal trauma    COMPARISON: Comparison to numerous prior examinations most recent on 2021    CONTRAST/COMPLICATIONS:  IV Contrast: Omnipaque 350 (accession [id number]), IV contrast documented in associated exam (accession [id number])  90 cc administered (accession [id number]), 0 cc administered (accession [id number]) 10 cc discarded (accession [id number]), 0 cc discarded (accession [id number])  Oral Contrast: NONE  Complications: None reported at time of study completion    PROCEDURE:  CT of the Chest, Abdomen and Pelvis was performed.  Sagittal and coronal reformats were performed.    FINDINGS:  CHEST:  LUNGS AND LARGE AIRWAYS: Bronchial thickening the bronchus intermedius extending into the right lower lobe bronchus. Secretions identified at the lower trachea and left mainstem bronchus. Centrilobular emphysema. Mucoid impacted airways in the right lower lobe. Dependent subsegmental atelectasis in both lobes.  PLEURA: No pleural effusion.  VESSELS: Atherosclerotic changes of the aorta and coronary arteries. Aberrant left subclavian artery.  HEART: Heart size is normal. No pericardial effusion.  MEDIASTINUM AND EMMA: No lymphadenopathy.  CHEST WALL AND LOWER NECK: Within normal limits.    ABDOMEN AND PELVIS:  LIVER: Multiple simple hepatic cysts largest measuring 5.7 x 5.2 cm in size. Heterogeneous hypoattenuating lesion is again demonstrated at the inferior aspect of segment VI measuring 2.3 x 1.9 cm, stable from prior examination likely representing hemangioma.  BILE DUCTS: Normal caliber.  GALLBLADDER: Within normal limits.  SPLEEN: Within normal limits.  PANCREAS: Within normal limits.  ADRENALS: Within normal limits.  KIDNEYS/URETERS: Symmetrical enhancement without hydronephrosis. 6 mm nonobstructing calculus in the lower pole of the left kidney. 6 mm nonobstructing calculus in the midpole of the left kidney.    BLADDER: Diffuse wall thickening of the of the urinary bladder most prominent at the posterior wall, and numerous bladder diverticulum again demonstrated. Small calcification at the posterior left bladder wall again demonstrated.  REPRODUCTIVE ORGANS: Fiducial markers versus surgical anchors identified within the prostate.    BOWEL: No bowel obstruction. Appendix is not visualized. No evidence of inflammation in the pericecal region.There is diverticulosis without evidence of diverticulitis.  PERITONEUM: No ascites.  VESSELS: Atherosclerotic changes.  RETROPERITONEUM/LYMPH NODES: No lymphadenopathy.  ABDOMINAL WALL: Within normal limits.  BONES: Comminuted intertrochanteric fracture of the proximal left femur. Degenerative changes and severe levoscoliosis.    IMPRESSION:    Comminuted intertrochanteric fracture of the proximal left femur.    Mucoid impacted airways with chronic aspiration in the right lower lobe.    Other chronic ancillary findings as above without change from prior exams.        --- End of Report ---            KENY FARLEY   This document has been electronically signed. Aug 22 2021 10:22PM    < end of copied text >      CRITICAL CARE TIME SPENT: ***

## 2021-08-24 NOTE — CHART NOTE - NSCHARTNOTEFT_GEN_A_CORE
Called by RN for pt agitated, continuously removing pulse ox, repeated calls from tele.       T(C): 37.2 (08-24-21 @ 20:00), Max: 37.2 (08-24-21 @ 20:00)  HR: 67 (08-24-21 @ 20:00) (67 - 108)  BP: 139/73 (08-24-21 @ 20:00) (87/55 - 139/73)  RR: 18 (08-24-21 @ 20:00) (16 - 20)  SpO2: 94% (08-24-21 @ 20:00) (90% - 100%)  Wt(kg): --      Assessment/Plan  85 y/o M PMHx of dementia, essential tremor, COPD not on home O2, severe scoliosis, HLD, hx of partial colon resection, admit for comminuted intertrochanteric fracture of the proximal left femur s/p suspected mechanical fall.  - Will give melatonin 3mg x 1 for agitation and compliance with continuous pulse ox   - continue to monitor  - RN to notify of any changes

## 2021-08-24 NOTE — DISCHARGE NOTE PROVIDER - NSDCMRMEDTOKEN_GEN_ALL_CORE_FT
Aricept 5 mg oral tablet: 1 tab(s) orally once a day (at bedtime)  Bacid (LAC) oral tablet: 1 tab(s) orally once a day  pravastatin 40 mg oral tablet: 1 tab(s) orally once a day  primidone 50 mg oral tablet: 2 tab(s) orally 2 times a day  propranolol 80 mg oral tablet: 1 tab(s) orally 2 times a day  tamsulosin 0.4 mg oral capsule: 1 cap(s) orally once a day  timolol hemihydrate 0.5% ophthalmic solution: 1 drop(s) to each affected eye once a day  Topamax 25 mg oral tablet: 1 tab(s) orally 2 times a day  Vitamin D3 50 mcg (2000 intl units) oral capsule: 1 cap(s) orally once a day  Xalatan 0.005% ophthalmic solution: 1 drop(s) to each affected eye once (at bedtime)   acetaminophen 325 mg oral tablet: 2 tab(s) orally every 6 hours, As needed, Temp greater or equal to 38C (100.4F), Mild Pain (1 - 3)  albuterol 90 mcg/inh inhalation aerosol: 1 puff(s) inhaled every 4 hours  Aricept 5 mg oral tablet: 1 tab(s) orally once a day (at bedtime)  ascorbic acid 500 mg oral tablet: 1 tab(s) orally 2 times a day  Bacid (LAC) oral tablet: 1 tab(s) orally once a day  budesonide-formoterol 160 mcg-4.5 mcg/inh inhalation aerosol: inhaled 2 times a day  folic acid 1 mg oral tablet: 1 tab(s) orally once a day  magnesium hydroxide 8% oral suspension: 30 milliliter(s) orally once a day, As needed, Constipation  Medrol Dosepak 4 mg oral tablet:   Multiple Vitamins oral tablet: 1 tab(s) orally once a day  oxyCODONE 10 mg oral tablet: 1 tab(s) orally every 4 hours, As needed, Severe Pain (7 - 10)  oxyCODONE 5 mg oral tablet: 1 tab(s) orally every 4 hours, As needed, Moderate Pain (4 - 6)  pantoprazole 40 mg oral delayed release tablet: 1 tab(s) orally once a day (before a meal)  pravastatin 40 mg oral tablet: 1 tab(s) orally once a day  primidone 50 mg oral tablet: 2 tab(s) orally 2 times a day  propranolol 80 mg oral tablet: 1 tab(s) orally 2 times a day  rivaroxaban 10 mg oral tablet: 1 tab(s) orally once a day  senna oral tablet: 2 tab(s) orally once a day (at bedtime)  tamsulosin 0.4 mg oral capsule: 1 cap(s) orally once a day  timolol hemihydrate 0.5% ophthalmic solution: 1 drop(s) to each affected eye once a day  tiotropium 18 mcg inhalation capsule: 1 cap(s) inhaled once a day  Topamax 25 mg oral tablet: 1 tab(s) orally 2 times a day  Vitamin D3 50 mcg (2000 intl units) oral capsule: 1 cap(s) orally once a day  Xalatan 0.005% ophthalmic solution: 1 drop(s) to each affected eye once (at bedtime)

## 2021-08-24 NOTE — DISCHARGE NOTE PROVIDER - CARE PROVIDERS DIRECT ADDRESSES
,DirectAddress_Unknown ,DirectAddress_Unknown,[email or web address],DirectAddress_Unknown

## 2021-08-24 NOTE — PHYSICAL THERAPY INITIAL EVALUATION ADULT - RANGE OF MOTION EXAMINATION, REHAB EVAL
decreased left hip ROM 2/2 sx/bilateral upper extremity ROM was WFL (within functional limits)/Right LE ROM was WFL (within functional limits)

## 2021-08-24 NOTE — PROGRESS NOTE ADULT - ASSESSMENT
86M with L hip fx now s/p operative repair, now with delirium, functional deficits    Functionally patient is a good candidate for subacute rehabilitation as he is not likely to be able to tolerate the full 3 hours daily of PT/OT required for acute rehab. He will require PT and OT at [facility] to address ongoing functional deficits. May also need SLP for cognitive rehabilitation compensatory strategies.    Recommend addition of melatonin QHS to establish sleep/wake cycle, frequent redirection/orientation, trend electrolytes. Indwelling catheter in place watch for change in urine quality at this time draining clear yellow tinged urine.    Chest PT/Suctioning with respiratory, taught patients wife technique for incentive spirometry she acknowledged understanding and helped patient with performing, advised as frequently as possible once every hour. Consider duonebs/inhaled steroids.

## 2021-08-24 NOTE — PROGRESS NOTE ADULT - ASSESSMENT
85 yo M s/p MF w/ Left IT fx with subtrochanteric extension POD1    -Medical management appreciated  -DVT ppx w/ Xarelto & SCDs  -WBAT  -PT/OT  -Pain control PRN  -ICE, rest, elevation PRN  -IS  -Discussed with Dr. Henriquez who agrees with plan  -Will advise if plan changes 85 yo M s/p MF w/ Left IT fx with subtrochanteric extension POD1    -Medical management appreciated  -DVT ppx w/ Xarelto & SCDs  -WBAT  -PT/OT  -FU AM Labs  -Pain control PRN  -ICE, rest, elevation PRN  -Incentive spirometry  -Dispo: pending PT evaluation  -Will discuss with attending Dr. Henriquez and advise if any changes to plan

## 2021-08-24 NOTE — INPATIENT CERTIFICATION FOR MEDICARE PATIENTS - CURRENT MEDICAL NEEDS AND CARE PLANS
Continue Regimen: Ketoconazole 2 % topical cream: Apply to affected areas of face twice a day x 1 month
Detail Level: Zone
Possible Acute Rehab

## 2021-08-24 NOTE — OCCUPATIONAL THERAPY INITIAL EVALUATION ADULT - PERTINENT HX OF CURRENT PROBLEM, REHAB EVAL
86M PMHX dementia, essential tremor, COPD not on home O2, severe scoliosis, HLD, hx of partial colon resection, here for mechanical fall at 18:30. Pt was walking from the bathroom to the kitchen and tripped. S/p IMN for left hip fx 8/23/21.

## 2021-08-24 NOTE — CHART NOTE - NSCHARTNOTEFT_GEN_A_CORE
Resident Rapid Response Note    Rapid response was called at 8:50 for increase HR and decrease BP.    Events leading up to Rapid Response: Pt is MF w/ Left IT fx with subtrochanteric extension POD1 for Intramedullary nailing of L IT fracture . Per remote TELE, pt found to be in SVT w/ HR 180s and BP 76/55.     Patient was seen and examined at the bedside by the rapid response team. ICU PA @ bedside.     Rapid Response Vital Signs:  BP: 76/55  HR: 180  RR: 30  SpO2: % 79 on NC 6L   Temp: 98  FS: 113        Physical Exam:  Gen: Ill appearing male. Moderate Respiratory distress.   HEENT: NCAT, PEERLA b/l, EOMI b/l  Cardio: increased rate and rhythm, +s1s2, no murmurs, rubs, or gallops  Pulm: Rhochi b/l   Abdomen: soft, nontender, nondistended, +BS x4 quadrants, no guarding  Extremities: +1 pitting edema   Neuro: AAOx3  Skin: warm and dry        Assessment/Plan: 86 year old M with PMHx  dementia, essential tremor, COPD not on home O2, severe scoliosis, HLD, hx of partial colon resection, admitted with comminuted intertrochanteric fracture of the proximal left femur s/p suspected mechanical fall. POD1 for Intramedullary nailing of L IT fracture s/p MF w/ Left IT fx with subtrochanteric extension     . Rapid response called for tachycardia of 180 and hypotension 76/55 found to be in SVT WHICH Spontaneously broke likely 2/2 to recent operation. On PE, rochi present in b/l lung fields. Pt denies any SOB or chest pain.     Plan:   - EKG stat  - Lasix 40 x 1   - Chest X-RAY stat     - Dr. Temple notified   -RN to call if any changes.  - Wife updated by phone call Resident Rapid Response Note    Rapid response was called at 8:50 for increase HR and decrease BP.    Events leading up to Rapid Response: Pt is MF w/ Left IT fx with subtrochanteric extension POD1 for Intramedullary nailing of L IT fracture . Per remote TELE, pt found to be in SVT w/ HR 180s and BP 76/55.     Patient was seen and examined at the bedside by the rapid response team. ICU PA @ bedside.     Rapid Response Vital Signs:  BP: 76/55  HR: 180  RR: 30  SpO2: % 79 on NC 6L   Temp: 98  FS: 113        Physical Exam:  Gen: Ill appearing male. Moderate Respiratory distress.   HEENT: NCAT, PEERLA b/l, EOMI b/l  Cardio: increased rate and rhythm, +s1s2, no murmurs, rubs, or gallops  Pulm: Rhochi b/l   Abdomen: soft, nontender, nondistended, +BS x4 quadrants, no guarding  Extremities: +1 pitting edema   Neuro: AAOx3  Skin: warm and dry        Assessment/Plan: 86 year old M with PMHx  dementia, essential tremor, COPD not on home O2, severe scoliosis, HLD, hx of partial colon resection, admitted with comminuted intertrochanteric fracture of the proximal left femur s/p suspected mechanical fall. POD1 for Intramedullary nailing of L IT fracture s/p MF w/ Left IT fx with subtrochanteric extension     . Rapid response called for tachycardia of 180 and hypotension 76/55 found to be in SVT WHICH Spontaneously broke likely 2/2 to recent operation. On PE, rochi present in b/l lung fields. Pt denies any SOB or chest pain.     Plan:   - EKG stat  - Lasix 40 x 1   - Chest X-RAY stat     Addendum   2 hr follow up           - Dr. Temple notified   -RN to call if any changes.  - Wife updated by phone call Resident Rapid Response Note    Rapid response was called at 8:50 for increase HR and decrease BP.    Events leading up to Rapid Response: Pt is MF w/ Left IT fx with subtrochanteric extension POD1 for Intramedullary nailing of L IT fracture . Per remote TELE, pt found to be in SVT w/ HR 180s and BP 76/55.     Patient was seen and examined at the bedside by the rapid response team. ICU PA @ bedside.     Rapid Response Vital Signs:  BP: 76/55  HR: 180  RR: 30  SpO2: % 79 on NC 6L   Temp: 98  FS: 113        Physical Exam:  Gen: Ill appearing male. Moderate Respiratory distress.   HEENT: NCAT, PEERLA b/l, EOMI b/l  Cardio: increased rate and rhythm, +s1s2, no murmurs, rubs, or gallops  Pulm: Rhochi b/l   Abdomen: soft, nontender, nondistended, +BS x4 quadrants, no guarding  Extremities: +1 pitting edema   Neuro: AAOx3  Skin: warm and dry        Assessment/Plan: 86 year old M with PMHx  dementia, essential tremor, COPD not on home O2, severe scoliosis, HLD, hx of partial colon resection, admitted with comminuted intertrochanteric fracture of the proximal left femur s/p suspected mechanical fall. POD1 for Intramedullary nailing of L IT fracture s/p MF w/ Left IT fx with subtrochanteric extension     . Rapid response called for tachycardia of 180 and hypotension 76/55 found to be in SVT WHICH Spontaneously broke likely 2/2 to recent operation. On PE, rochi present in b/l lung fields. Pt denies any SOB or chest pain.     Plan:   - EKG stat  - Lasix 40 x 1   - Chest X-RAY stat     Addendum   2 hr follow up   Pt seen and examed at bedside    ROS:  CONSTITUTIONAL: No weakness, fevers or chills  EYES/ENT: No visual changes;  No vertigo or throat pain   NECK: No pain or stiffness  RESPIRATORY: + SOB   CARDIOVASCULAR: No chest pain or palpitations  GASTROINTESTINAL: No abdominal or epigastric pain. No nausea, vomiting, or hematemesis; No diarrhea or constipation. No melena or hematochezia.  GENITOURINARY: No dysuria, frequency or hematuria  NEUROLOGICAL: No numbness or weakness  SKIN: No itching, burning, rashes, or lesions   All other review of systems is negative unless indicated above.    PHYSICAL EXAM:  General: Ill appearing man. NAD  HEENT: NCAT, PERRLA, EOMI B/L, moist mucous membranes   Neurology: A&Ox3,   Respiratory: expiratory rhonchi   CV: RRR, +S1/S2, no murmurs, rubs or gallops  Abdominal: Soft, NT, ND +BSx4  Extremities: No clubbing, cyanosis, edema; + peripheral pulses  Skin: warm, dry, normal color, no rash or abnormal lesions      Vital Signs Last 24 Hrs  T(C): 36.7 (24 Aug 2021 11:45), Max: 37.1 (24 Aug 2021 05:18)  T(F): 98.1 (24 Aug 2021 11:45), Max: 98.8 (24 Aug 2021 05:18)  HR: 90 (24 Aug 2021 11:45) (60 - 108)  BP: 94/55 (24 Aug 2021 11:45) (94/55 - 137/57)  BP(mean): --  RR: 20 (24 Aug 2021 11:45) (10 - 20)  SpO2: 100% (24 Aug 2021 11:45) (90% - 100%)                            12.0   11.15 )-----------( 149      ( 23 Aug 2021 19:23 )             35.9     08-24    139  |  103  |  19  ----------------------------<  120<H>  3.8   |  28  |  0.82    Ca    8.2<L>      24 Aug 2021 07:42    TPro  6.6  /  Alb  2.7<L>  /  TBili  0.6  /  DBili  x   /  AST  16  /  ALT  23  /  AlkPhos  120  08-23    PT/INR - ( 23 Aug 2021 08:41 )   PT: 12.4 sec;   INR: 1.06 ratio         PTT - ( 23 Aug 2021 08:41 )  PTT:30.9 sec  Urinalysis Basic - ( 22 Aug 2021 23:19 )    Color: Yellow / Appearance: Slightly Turbid / S.010 / pH: x  Gluc: x / Ketone: Small  / Bili: Negative / Urobili: Negative   Blood: x / Protein: 30 mg/dL / Nitrite: Positive   Leuk Esterase: Moderate / RBC: 3-5 /HPF / WBC 26-50   Sq Epi: x / Non Sq Epi: Occasional / Bacteria: TNTC          A/P        - Dr. Temple notified   -RN to call if any changes.  - Wife updated by phone call Resident Rapid Response Note    Rapid response was called at 8:50 for increase HR and decrease BP.    Events leading up to Rapid Response: Pt came in w/ MF w/ Left IT fx with subtrochanteric extension POD1 for Intramedullary nailing of L IT fracture . Per remote TELE, pt found to be in SVT w/ HR 180s and BP 76/55. Adenosine was about to be given but patient spontaneously converted to normal sinus rhythm. EKG was done confirming sinus tachycardia and BP improved after HR improved. Patient is alert and responsive and answers all questions appropriately. Denies cp, and shortness of breath. No acute complaints.    Patient was seen and examined at the bedside by the rapid response team. ICU PA @ bedside.     Rapid Response Vital Signs:  BP: 76/55 -> 122/71  HR: 180  RR: 30  SpO2: % 79 on NC 6L -> 90% on 3LNC  Temp: 98  FS: 113        Physical Exam:  Gen: Ill appearing male, NAD  HEENT: NCAT, PEERLA b/l, EOMI b/l  Cardio: tachycardic, +s1s2, no murmurs, rubs, or gallops  Pulm: Rhochi b/l   Abdomen: soft, nontender, nondistended, +BS x4 quadrants, no guarding  Extremities: +1 pitting edema   Neuro: AAOx3  Skin: warm and dry        Assessment/Plan: 86 year old M with PMHx  dementia, essential tremor, COPD not on home O2, severe scoliosis, HLD, hx of partial colon resection, admitted with comminuted intertrochanteric fracture of the proximal left femur s/p suspected mechanical fall. POD1 for Intramedullary nailing of L IT fracture s/p MF w/ Left IT fx with subtrochanteric extension     . Rapid response called for tachycardia of 180 and hypotension 76/55 found to be in SVT WHICH Spontaneously broke likely 2/2 to recent operation. On PE, rhonchi present in b/l lung fields. Pt denies any SOB or chest pain.     Plan:   - EKG stat showing sinus tachycardia  - Lasix 40 IV x 1 pt appears to be volume overloaded   - Chest X-RAY stat showing: R pleural effusions and bibasilar opacities and consolidation suspicious for PNA   - Dr. Medrano @ bedside   - Dr. Temple notified   -RN to call if any changes.  - Wife updated by phone call      Addendum   2 hr follow up   Pt seen and examined at bedside. Pt reports SOB and denies chest pain. On PE, pt still has coarse breath sounds b/l, no pitting edema noted. Tachycardia improved. Plan per primary team.

## 2021-08-24 NOTE — DISCHARGE NOTE PROVIDER - NSDCCPCAREPLAN_GEN_ALL_CORE_FT
PRINCIPAL DISCHARGE DIAGNOSIS  Diagnosis: Hip fracture, left, closed, initial encounter  Assessment and Plan of Treatment: subtrochanteric      SECONDARY DISCHARGE DIAGNOSES  Diagnosis: Rib contusion, left, initial encounter  Assessment and Plan of Treatment:     Diagnosis: Fall, initial encounter  Assessment and Plan of Treatment:

## 2021-08-24 NOTE — PROGRESS NOTE ADULT - SUBJECTIVE AND OBJECTIVE BOX
[facility] -- Leonard Mercado, Shaquille, Deana, Beau Mathis Savella  Office # [phone]      Follow Up:    SVT  Subjective/Observations:   Seen at bedside, SP RRT earlier for SVT with hypotension was diaphoretic at time of exam   unable to provide any meaningful informaton    REVIEW OF SYSTEMS: All other review of systems is negative unless indicated above    PAST MEDICAL & SURGICAL HISTORY:  Scoliosis    Chronic cough    Osteoporosis    Occasional tremors  severe essential tremors    Memory loss  dementia    COPD, severe    HLD (hyperlipidemia)    H/O partial resection of colon        MEDICATIONS  (STANDING):  ascorbic acid 500 milliGRAM(s) Oral two times a day  budesonide 160 MICROgram(s)/formoterol 4.5 MICROgram(s) Inhaler 2 Puff(s) Inhalation two times a day  ceFAZolin   IVPB 2000 milliGRAM(s) IV Intermittent every 8 hours  folic acid 1 milliGRAM(s) Oral daily  lactated ringers. 1000 milliLiter(s) (75 mL/Hr) IV Continuous <Continuous>  lactobacillus acidophilus 1 Tablet(s) Oral daily  latanoprost 0.005% Ophthalmic Solution 1 Drop(s) Both EYES at bedtime  multivitamin 1 Tablet(s) Oral daily  pantoprazole    Tablet 40 milliGRAM(s) Oral before breakfast  primidone 50 milliGRAM(s) Oral two times a day  rivaroxaban 10 milliGRAM(s) Oral daily  senna 2 Tablet(s) Oral at bedtime  tamsulosin 0.4 milliGRAM(s) Oral at bedtime  timolol 0.5% Solution 1 Drop(s) Both EYES daily  tiotropium 18 MICROgram(s) Capsule 1 Capsule(s) Inhalation daily  topiramate 25 milliGRAM(s) Oral two times a day    MEDICATIONS  (PRN):  acetaminophen   Tablet .. 650 milliGRAM(s) Oral every 6 hours PRN Temp greater or equal to 38C (100.4F), Mild Pain (1 - 3)  magnesium hydroxide Suspension 30 milliLiter(s) Oral daily PRN Constipation  ondansetron Injectable 4 milliGRAM(s) IV Push every 6 hours PRN Nausea and/or Vomiting  oxyCODONE    IR 10 milliGRAM(s) Oral every 4 hours PRN Severe Pain (7 - 10)  oxyCODONE    IR 5 milliGRAM(s) Oral every 4 hours PRN Moderate Pain (4 - 6)      Allergies    azithromycin (Rash)    Intolerances    lactose (Diarrhea)      Vital Signs Last 24 Hrs  T(C): 36.7 (24 Aug 2021 09:20), Max: 37.1 (23 Aug 2021 12:00)  T(F): 98 (24 Aug 2021 09:20), Max: 98.8 (24 Aug 2021 05:18)  HR: 108 (24 Aug 2021 09:20) (57 - 108)  BP: 122/71 (24 Aug 2021 09:20) (100/74 - 152/66)  BP(mean): --  RR: 17 (24 Aug 2021 09:20) (10 - 20)  SpO2: 90% (24 Aug 2021 09:20) (90% - 100%)    I&O's Summary    23 Aug 2021 07:01  -  24 Aug 2021 07:00  --------------------------------------------------------  IN: 945 mL / OUT: 600 mL / NET: 345 mL          PHYSICAL EXAM:  TELE: ST   Constitutional: NAD, awake and alert, diaphoretic   HEENT: Moist Mucous Membranes, Anicteric  Pulmonary: Non-labored, diffuse rhonchi  Cardiovascular: tachy S1 and S2 nl, No murmurs, rubs, gallops or clicks  Gastrointestinal: Bowel Sounds present, soft, nontender.   Lymph: + peripheral edema.  Skin: No visible rashes or ulcers.  Psych:  Mood & affect appropriate    LABS: All Labs Reviewed:                        12.0   11.15 )-----------( 149      ( 23 Aug 2021 19:23 )             35.9                         13.4   8.06  )-----------( 176      ( 23 Aug 2021 06:55 )             40.6                         14.6   6.75  )-----------( 233      ( 22 Aug 2021 20:15 )             44.8     24 Aug 2021 07:42    139    |  103    |  19     ----------------------------<  120    3.8     |  28     |  0.82   23 Aug 2021 19:23    139    |  108    |  22     ----------------------------<  108    3.8     |  26     |  0.78   23 Aug 2021 06:55    141    |  106    |  23     ----------------------------<  96     4.1     |  26     |  0.68     Ca    8.2        24 Aug 2021 07:42  Ca    7.9        23 Aug 2021 19:23  Ca    8.2        23 Aug 2021 06:55    TPro  6.6    /  Alb  2.7    /  TBili  0.6    /  DBili  x      /  AST  16     /  ALT  23     /  AlkPhos  120    23 Aug 2021 06:55  TPro  7.2    /  Alb  2.9    /  TBili  0.5    /  DBili  x      /  AST  18     /  ALT  26     /  AlkPhos  132    22 Aug 2021 20:15    PT/INR - ( 23 Aug 2021 08:41 )   PT: 12.4 sec;   INR: 1.06 ratio         PTT - ( 23 Aug 2021 08:41 )  PTT:30.9 sec  CARDIAC MARKERS ( 22 Aug 2021 20:15 )  x     / x     / 46 U/L / x     / x

## 2021-08-24 NOTE — PROGRESS NOTE ADULT - SUBJECTIVE AND OBJECTIVE BOX
Pt seen and examined at bedside this AM. Pt tolerated procedure well. Pt states pain well controlled with medications, but has some soreness. States he has left sided soreness. Denies CP/SOB/fever/chills/n/v/numbness/tingling LLE. No acute events overnight.        PE:   GEN: NAD, resting in bed  LLE: 2x aquacel's in place over surgical incisions  L2-S1 SILT  motor grossly intact, +TA/EHL/FHL/GSC  + DP pulses  compartments soft and compressible b/l  calves nontender b/l Pt seen and examined at bedside this AM. Pt tolerated procedure well. Pt states pain well controlled with medications, but has left sided soreness. Denies CP/SOB/fever/chills/n/v/numbness/tingling LLE. No acute events overnight.     Vital Signs Last 24 Hrs  T(C): 37.1 (24 Aug 2021 05:18), Max: 37.1 (23 Aug 2021 12:00)  T(F): 98.8 (24 Aug 2021 05:18), Max: 98.8 (24 Aug 2021 05:18)  HR: 86 (24 Aug 2021 05:18) (57 - 86)  BP: 134/70 (24 Aug 2021 05:18) (100/74 - 152/66)  BP(mean): --  RR: 16 (24 Aug 2021 05:18) (10 - 20)  SpO2: 92% (24 Aug 2021 05:18) (91% - 100%)                            12.0   11.15 )-----------( 149      ( 23 Aug 2021 19:23 )             35.9          PE:   GEN: NAD, resting in bed  LLE: 2x aquacel's in place over surgical incisions  L2-S1 SILT  motor grossly intact, +TA/EHL/FHL/GSC  + DP pulses  compartments soft and compressible b/l  calves nontender b/l

## 2021-08-24 NOTE — PHYSICAL THERAPY INITIAL EVALUATION ADULT - PERTINENT HX OF CURRENT PROBLEM, REHAB EVAL
Pt is an 86 yo male PMHx of dementia, essential tremor, COPD not on home O2, severe scoliosis, HLD, hx of partial colon resection, admit 8/22 for comminuted intertrochanteric fracture of the proximal left femur s/p suspected mechanical fall.

## 2021-08-24 NOTE — DISCHARGE NOTE PROVIDER - NSDCFUADDINST_GEN_ALL_CORE_FT
1.	Pain Control as needed  2.	Walking with full weight bearing as tolerated, with assistive devices (walker/Cane as Needed)  3.	DVT Prophylaxis with Xarelto for 30 days  4.	PT as needed  5.	Follow up with Dr. Henriquez as Outpatient in 10-14 Days after Discharge from the Hospital or Rehab. Call Office For Appointment.  6.	Remove Staples Post-Op Day 14, and Remove Dressing Post-Op Day 10, with Daily Dressing Changes as Need.  7.	Ice affected area as Needed  8.	Keep Dressing  Clean and dry.

## 2021-08-24 NOTE — PROGRESS NOTE ADULT - ASSESSMENT
Pt. with left hip fx stable postop orif.   Needs extensive rehab.   COPD with some mucus plugging.   Has UTI.   Dementia  Suggest chest percussion, inhalers.  DVT prophylaxis.

## 2021-08-24 NOTE — PROGRESS NOTE ADULT - SUBJECTIVE AND OBJECTIVE BOX
Patient is a 86y old  Male who presents with a chief complaint of left hip fracture (24 Aug 2021 10:41)      INTERVAL /OVERNIGHT EVENTS: alert awake, ? confused    MEDICATIONS  (STANDING):  ascorbic acid 500 milliGRAM(s) Oral two times a day  budesonide 160 MICROgram(s)/formoterol 4.5 MICROgram(s) Inhaler 2 Puff(s) Inhalation two times a day  folic acid 1 milliGRAM(s) Oral daily  lactobacillus acidophilus 1 Tablet(s) Oral daily  latanoprost 0.005% Ophthalmic Solution 1 Drop(s) Both EYES at bedtime  multivitamin 1 Tablet(s) Oral daily  pantoprazole    Tablet 40 milliGRAM(s) Oral before breakfast  piperacillin/tazobactam IVPB. 3.375 Gram(s) IV Intermittent once  piperacillin/tazobactam IVPB.. 3.375 Gram(s) IV Intermittent every 8 hours  primidone 50 milliGRAM(s) Oral two times a day  propranolol 80 milliGRAM(s) Oral two times a day  rivaroxaban 10 milliGRAM(s) Oral daily  senna 2 Tablet(s) Oral at bedtime  tamsulosin 0.4 milliGRAM(s) Oral at bedtime  timolol 0.5% Solution 1 Drop(s) Both EYES daily  tiotropium 18 MICROgram(s) Capsule 1 Capsule(s) Inhalation daily  topiramate 25 milliGRAM(s) Oral two times a day    MEDICATIONS  (PRN):  acetaminophen   Tablet .. 650 milliGRAM(s) Oral every 6 hours PRN Temp greater or equal to 38C (100.4F), Mild Pain (1 - 3)  magnesium hydroxide Suspension 30 milliLiter(s) Oral daily PRN Constipation  ondansetron Injectable 4 milliGRAM(s) IV Push every 6 hours PRN Nausea and/or Vomiting  oxyCODONE    IR 10 milliGRAM(s) Oral every 4 hours PRN Severe Pain (7 - 10)  oxyCODONE    IR 5 milliGRAM(s) Oral every 4 hours PRN Moderate Pain (4 - 6)      Allergies    azithromycin (Rash)    Intolerances    lactose (Diarrhea)      REVIEW OF SYSTEMS:  CONSTITUTIONAL: No fever, weight loss, or fatigue  EYES: No eye pain, visual disturbances, or discharge  ENMT:  No difficulty hearing, tinnitus, vertigo; No sinus or throat pain  NECK: No pain or stiffness  RESPIRATORY: No cough, wheezing, chills or hemoptysis; No shortness of breath  CARDIOVASCULAR: No chest pain, palpitations, dizziness, or leg swelling  GASTROINTESTINAL: No abdominal or epigastric pain. No nausea, vomiting, or hematemesis; No diarrhea or constipation. No melena or hematochezia.  GENITOURINARY: No dysuria, frequency, hematuria, or incontinence  NEUROLOGICAL: No headaches, memory loss, loss of strength, numbness, or tremors  SKIN: No itching, burning, rashes, or lesions   LYMPH NODES: No enlarged glands  ENDOCRINE: No heat or cold intolerance; No hair loss; No polydipsia or polyuria  MUSCULOSKELETAL: + joint pain or swelling; No muscle, back, or extremity pain  PSYCHIATRIC: No depression, anxiety, mood swings, or difficulty sleeping  HEME/LYMPH: No easy bruising, or bleeding gums  ALLERGY AND IMMUNOLOGIC: No hives or eczema    Vital Signs Last 24 Hrs  T(C): 36.4 (24 Aug 2021 13:45), Max: 37.1 (24 Aug 2021 05:18)  T(F): 97.6 (24 Aug 2021 13:45), Max: 98.8 (24 Aug 2021 05:18)  HR: 68 (24 Aug 2021 17:30) (60 - 108)  BP: 105/59 (24 Aug 2021 17:30) (87/55 - 134/70)  BP(mean): --  RR: 19 (24 Aug 2021 17:30) (12 - 20)  SpO2: 93% (24 Aug 2021 17:30) (90% - 100%)    PHYSICAL EXAM:  GENERAL: NAD, well-groomed, well-developed  HEAD:  Atraumatic, Normocephalic  EYES: EOMI, PERRLA, conjunctiva and sclera clear  ENMT: No tonsillar erythema, exudates, or enlargement; Moist mucous membranes, Good dentition, No lesions  NECK: Supple, No JVD, Normal thyroid  NERVOUS SYSTEM:  Alert & Oriented X3, Good concentration; Motor Strength 5/5 B/L upper and lower extremities; DTRs 2+ intact and symmetric  CHEST/LUNG: Clear to auscultation bilaterally; No rales, rhonchi, wheezing, or rubs  HEART: Regular rate and rhythm; No murmurs, rubs, or gallops  ABDOMEN: Soft, Nontender, Nondistended; Bowel sounds present  EXTREMITIES:  2+ Peripheral Pulses, No clubbing, cyanosis, or edema  LYMPH: No lymphadenopathy noted  SKIN: No rashes or lesions    LABS:                        12.6   11.33 )-----------( 179      ( 24 Aug 2021 12:06 )             38.1     24 Aug 2021 07:42    139    |  103    |  19     ----------------------------<  120    3.8     |  28     |  0.82     Ca    8.2        24 Aug 2021 07:42  Phos  2.6       24 Aug 2021 12:06  Mg     1.8       24 Aug 2021 12:06      PT/INR - ( 23 Aug 2021 08:41 )   PT: 12.4 sec;   INR: 1.06 ratio         PTT - ( 23 Aug 2021 08:41 )  PTT:30.9 sec  Urinalysis Basic - ( 22 Aug 2021 23:19 )    Color: Yellow / Appearance: Slightly Turbid / S.010 / pH: x  Gluc: x / Ketone: Small  / Bili: Negative / Urobili: Negative   Blood: x / Protein: 30 mg/dL / Nitrite: Positive   Leuk Esterase: Moderate / RBC: 3-5 /HPF / WBC 26-50   Sq Epi: x / Non Sq Epi: Occasional / Bacteria: TNTC      CAPILLARY BLOOD GLUCOSE      POCT Blood Glucose.: 113 mg/dL (24 Aug 2021 08:56)      RADIOLOGY & ADDITIONAL TESTS:    Notes Reviewed:  [x ] YES  [ ] NO    Care Discussed with Consultants/Other Providers [x ] YES  [ ] NO

## 2021-08-24 NOTE — PROGRESS NOTE ADULT - SUBJECTIVE AND OBJECTIVE BOX
Physical Medicine and Rehabilitation Subsequent Evaluation    Patient seen in followup for functional deficits, interval assessment of pain, progression with PT    Patient seen and examined at bedside, his wife is also at bedside. Patient noted to be orthostatic, with generally low blood pressures today, with episodic delirium/agitation wanting to pull his indwelling catheter out, general restlessness. Patient also with increased assistance required with bed mobility, basic transfers being at level of maximal assistance required. Seems to be generally comfortable in bed however, denies any pain at this time.    Medical studies/laboratory studies reviewed, includin.6   11.33 )-----------( 179      ( 24 Aug 2021 12:06 )             38.1   08-24    139  |  103  |  19  ----------------------------<  120<H>  3.8   |  28  |  0.82    Ca    8.2<L>      24 Aug 2021 07:42  Phos  2.6     08-24  Mg     1.8     08-24    TPro  6.6  /  Alb  2.7<L>  /  TBili  0.6  /  DBili  x   /  AST  16  /  ALT  23  /  AlkPhos  120  08-23    ROS: Patient not able to provide full ROS due to cognitive deficits/delirium    PM, Social, Family Hx: unchanged from initial eval yesterday    Physical Exam:   Vital Signs Last 24 Hrs  T(C): 37.2 (24 Aug 2021 20:00), Max: 37.2 (24 Aug 2021 20:00)  T(F): 98.9 (24 Aug 2021 20:00), Max: 98.9 (24 Aug 2021 20:00)  HR: 67 (24 Aug 2021 20:00) (67 - 108)  BP: 139/73 (24 Aug 2021 20:00) (87/55 - 139/73)  BP(mean): --  RR: 18 (24 Aug 2021 20:00) (16 - 20)  SpO2: 94% (24 Aug 2021 20:00) (90% - 100%)    Constitutional: Gen: In no acute distress, cooperative with exam and questioning   CV: Regular rate and rhythm, S1 S2, trace bilateral lower extremity pitting peripheral edema, pedal pulses intact  Resp: Good respiratory effort, positive rhonchi in bilateral upper airways, positive bilateral basilar crackles  Neuro: Cranial Nerves II-XII intact, sensation intact to light touch in peripheral upper and lower extremities  MSK: No cyanosis or clubbing of nails, strength 4- to 4/5 in bilateral upper and lower extremities with exception of left hip limited by pain/swelling, ROM full in all extremities passively with exception of L hip not fully tested due to pain  Neck: No midline tenderness to palpation, supple  Skin: No rashes on limbs, normal temperature on palpation  Psychiatric: patient more alert today, oriented to self only

## 2021-08-24 NOTE — DISCHARGE NOTE PROVIDER - PROVIDER TOKENS
PROVIDER:[TOKEN:[[id number]]] PROVIDER:[TOKEN:[[id number]]],PROVIDER:[TOKEN:[[id number]]],PROVIDER:[TOKEN:[[id number]]]

## 2021-08-25 LAB
ANION GAP SERPL CALC-SCNC: 10 MMOL/L — SIGNIFICANT CHANGE UP (ref 5–17)
BUN SERPL-MCNC: 29 MG/DL — HIGH (ref 7–23)
CALCIUM SERPL-MCNC: 7.9 MG/DL — LOW (ref 8.5–10.1)
CHLORIDE SERPL-SCNC: 103 MMOL/L — SIGNIFICANT CHANGE UP (ref 96–108)
CO2 SERPL-SCNC: 25 MMOL/L — SIGNIFICANT CHANGE UP (ref 22–31)
CREAT SERPL-MCNC: 1.4 MG/DL — HIGH (ref 0.5–1.3)
GLUCOSE SERPL-MCNC: 105 MG/DL — HIGH (ref 70–99)
HCT VFR BLD CALC: 32.3 % — LOW (ref 39–50)
HGB BLD-MCNC: 11.1 G/DL — LOW (ref 13–17)
MCHC RBC-ENTMCNC: 30.7 PG — SIGNIFICANT CHANGE UP (ref 27–34)
MCHC RBC-ENTMCNC: 34.4 GM/DL — SIGNIFICANT CHANGE UP (ref 32–36)
MCV RBC AUTO: 89.5 FL — SIGNIFICANT CHANGE UP (ref 80–100)
NRBC # BLD: 0 /100 WBCS — SIGNIFICANT CHANGE UP (ref 0–0)
PLATELET # BLD AUTO: 158 K/UL — SIGNIFICANT CHANGE UP (ref 150–400)
POTASSIUM SERPL-MCNC: 3.6 MMOL/L — SIGNIFICANT CHANGE UP (ref 3.5–5.3)
POTASSIUM SERPL-SCNC: 3.6 MMOL/L — SIGNIFICANT CHANGE UP (ref 3.5–5.3)
RBC # BLD: 3.61 M/UL — LOW (ref 4.2–5.8)
RBC # FLD: 13.2 % — SIGNIFICANT CHANGE UP (ref 10.3–14.5)
SODIUM SERPL-SCNC: 138 MMOL/L — SIGNIFICANT CHANGE UP (ref 135–145)
WBC # BLD: 10.67 K/UL — HIGH (ref 3.8–10.5)
WBC # FLD AUTO: 10.67 K/UL — HIGH (ref 3.8–10.5)

## 2021-08-25 PROCEDURE — 99232 SBSQ HOSP IP/OBS MODERATE 35: CPT

## 2021-08-25 PROCEDURE — 74230 X-RAY XM SWLNG FUNCJ C+: CPT | Mod: 26

## 2021-08-25 RX ORDER — IPRATROPIUM/ALBUTEROL SULFATE 18-103MCG
3 AEROSOL WITH ADAPTER (GRAM) INHALATION ONCE
Refills: 0 | Status: COMPLETED | OUTPATIENT
Start: 2021-08-25 | End: 2021-08-25

## 2021-08-25 RX ADMIN — BUDESONIDE AND FORMOTEROL FUMARATE DIHYDRATE 2 PUFF(S): 160; 4.5 AEROSOL RESPIRATORY (INHALATION) at 21:02

## 2021-08-25 RX ADMIN — Medication 500 MILLIGRAM(S): at 21:07

## 2021-08-25 RX ADMIN — Medication 1 TABLET(S): at 13:00

## 2021-08-25 RX ADMIN — RIVAROXABAN 10 MILLIGRAM(S): KIT at 13:00

## 2021-08-25 RX ADMIN — PRIMIDONE 50 MILLIGRAM(S): 250 TABLET ORAL at 05:15

## 2021-08-25 RX ADMIN — PIPERACILLIN AND TAZOBACTAM 25 GRAM(S): 4; .5 INJECTION, POWDER, LYOPHILIZED, FOR SOLUTION INTRAVENOUS at 05:15

## 2021-08-25 RX ADMIN — TAMSULOSIN HYDROCHLORIDE 0.4 MILLIGRAM(S): 0.4 CAPSULE ORAL at 21:07

## 2021-08-25 RX ADMIN — LATANOPROST 1 DROP(S): 0.05 SOLUTION/ DROPS OPHTHALMIC; TOPICAL at 21:07

## 2021-08-25 RX ADMIN — TIOTROPIUM BROMIDE 1 CAPSULE(S): 18 CAPSULE ORAL; RESPIRATORY (INHALATION) at 05:17

## 2021-08-25 RX ADMIN — PANTOPRAZOLE SODIUM 40 MILLIGRAM(S): 20 TABLET, DELAYED RELEASE ORAL at 05:15

## 2021-08-25 RX ADMIN — Medication 3 MILLILITER(S): at 22:06

## 2021-08-25 RX ADMIN — BUDESONIDE AND FORMOTEROL FUMARATE DIHYDRATE 2 PUFF(S): 160; 4.5 AEROSOL RESPIRATORY (INHALATION) at 05:16

## 2021-08-25 RX ADMIN — PIPERACILLIN AND TAZOBACTAM 25 GRAM(S): 4; .5 INJECTION, POWDER, LYOPHILIZED, FOR SOLUTION INTRAVENOUS at 13:11

## 2021-08-25 RX ADMIN — SENNA PLUS 2 TABLET(S): 8.6 TABLET ORAL at 21:07

## 2021-08-25 RX ADMIN — Medication 500 MILLIGRAM(S): at 05:15

## 2021-08-25 RX ADMIN — Medication 1 DROP(S): at 13:11

## 2021-08-25 RX ADMIN — Medication 1 MILLIGRAM(S): at 13:00

## 2021-08-25 RX ADMIN — Medication 25 MILLIGRAM(S): at 17:16

## 2021-08-25 RX ADMIN — PRIMIDONE 50 MILLIGRAM(S): 250 TABLET ORAL at 17:16

## 2021-08-25 RX ADMIN — Medication 25 MILLIGRAM(S): at 05:17

## 2021-08-25 NOTE — SWALLOW VFSS/MBS ASSESSMENT ADULT - RECOMMENDED CONSISTENCY
1. Dysphagia 1 (puree) with honey thick liquids via teaspoon presentations, as tolerated  2. Full assistance during all meals to ensure upright positioning during all meals, teaspoon presentations only!, and slow pacing  3. Maintain strict aspiration precautions  4. Maintain strict oral hygiene

## 2021-08-25 NOTE — PROGRESS NOTE ADULT - ASSESSMENT
Pt. with left hip fx stable postop orif.   Needs extensive rehab.   COPD with some mucus plugging.   Has UTI.   Dementia  Continue chest percussion, inhalers.  DVT prophylaxis.

## 2021-08-25 NOTE — DIETITIAN INITIAL EVALUATION ADULT. - OTHER INFO
86 year old male Dx left hip PMH HLD COPD dementia OA   per family unsure of weight history may have lost weight . bed scale wt 158.2#   unable to complete nutrition focused exam patient sleeping in chair.  lactose intolerance no other allergies  patient on VIt D3 at home

## 2021-08-25 NOTE — DIETITIAN INITIAL EVALUATION ADULT. - PROBLEM SELECTOR PLAN 2
-sinus shanti   -suspect sinus shanti in setting of propanolol use for essential tremors   -EKG rate 48 bpm, no ischemic changes  -check 2D ECHO  -consult cardio

## 2021-08-25 NOTE — DIETITIAN INITIAL EVALUATION ADULT. - ADD RECOMMEND
1. will provide food preferences as allowed on puree diet  2. will follow weights 3. encourage PO intake

## 2021-08-25 NOTE — DIETITIAN INITIAL EVALUATION ADULT. - ORAL INTAKE PTA/DIET HISTORY
per family patient was eating solid food at home. likes turkey burgers and Chinese food. patient usually eats 2 meals per day as he sleeps till 12pm.  patient did not like puree food for lunch . food preferences reviewed noted lactose intolerance. dislikes puree vegetables (doesn't eat salad at home) dislikes puree meat/protein entree)  will try puree egg salad/puree tuna likes puree fruit and applesauce. willing to try sc egg and puree Algerian toast

## 2021-08-25 NOTE — SWALLOW VFSS/MBS ASSESSMENT ADULT - COMMENTS
Full report in progress The patient was received in the radiology suite this afternoon for a Modified Barium Swallow Study, at which time he was awake and cooperative. Patient accompanied to the radiology suite by RN who monitored patient's vitals throughout the evaluation. Patient currently receiving supplemental O2 via 3L NC in place. SpO2 remained between 90-92% throughout today's evaluation. The patient was last seen by this department for a f/u on 8/24/21, at which time oral nutrition/hydration was contraindicated and a MBSS was recommended.    Per charting, the patient is an "87 y/o M PMHx of dementia, essential tremor, COPD not on home O2, severe scoliosis, HLD, hx of partial colon resection, admit for comminuted intertrochanteric fracture of the proximal left femur s/p suspected mechanical fall. Patient now s/p MF w/ Left IT fx with subtrochanteric extension POD2."    WBC is elevated. CXR revealed, "The evaluation of the cardiomediastinal silhouette is limited on portable technique. Mildly tortuous, calcified aorta. Low lung volumes are present. There is a probable small right-sided pleural effusion with underlying right infrahilar/lower lobe airspace consolidation. There is patchy consolidation at the left lung base."    Discussed results and recommendations from this evaluation with the patient, RN, and call out to MD.

## 2021-08-25 NOTE — CONSULT NOTE ADULT - ASSESSMENT
Patient is a 86 year old male with PMH of dementia, essential tremor, COPD not on home O2, severe scoliosis, HLD, partial colon resection who presented after a mechanical fall and admitted for left femur fracture.     Rule out UTI   - UA with WBC 26-50, mod LE, +nitrite, bacteria and occasional epithelial cells - suspect contaminated specimen    - urine culture with CoNS   - pt asymptomatic, denies any urinary complaints, afebrile   - s/p ceftriaxone 8/22 > pip-tazo now (total d5 of abx)   - discontinue pip-tazo     Proximal L comminuted intertrochanteric fracture s/p mechanical fall   - s/p subtrochanteric extension 8/23   - Orthopedics following    Leukocytosis likely reactive due to above    - WBC trended down, afebrile    - monitor temps/WBC    COPD with mucus plugging    - Pulmonary following, on chest percussion and inhalers       Lacy Yanez M.D.  [facility], Division of Infectious Diseases  [phone]  After 5pm on weekdays and all day on weekends - please call [phone]

## 2021-08-25 NOTE — PROGRESS NOTE ADULT - SUBJECTIVE AND OBJECTIVE BOX
Patient is a 86y old  Male who presents with a chief complaint of left hip fracture (25 Aug 2021 10:59)  no complaints presently  wife at bedside, states current breathing status and cough are at his baseline      INTERVAL HPI/OVERNIGHT EVENTS:  T(C): 37.1 (08-25-21 @ 09:58), Max: 37.3 (08-25-21 @ 04:49)  HR: 60 (08-25-21 @ 09:58) (60 - 95)  BP: 116/66 (08-25-21 @ 09:58) (105/59 - 139/73)  RR: 19 (08-25-21 @ 09:58) (18 - 19)  SpO2: 90% (08-25-21 @ 09:58) (90% - 94%)  Wt(kg): --  I&O's Summary    24 Aug 2021 07:01  -  25 Aug 2021 07:00  --------------------------------------------------------  IN: 0 mL / OUT: 425 mL / NET: -425 mL        LABS:                        11.1   10.67 )-----------( 158      ( 25 Aug 2021 07:12 )             32.3     08-25    138  |  103  |  29<H>  ----------------------------<  105<H>  3.6   |  25  |  1.40<H>    Ca    7.9<L>      25 Aug 2021 07:12  Phos  2.6     08-24  Mg     1.8     08-24          CAPILLARY BLOOD GLUCOSE                MEDICATIONS  (STANDING):  ascorbic acid 500 milliGRAM(s) Oral two times a day  budesonide 160 MICROgram(s)/formoterol 4.5 MICROgram(s) Inhaler 2 Puff(s) Inhalation two times a day  folic acid 1 milliGRAM(s) Oral daily  lactobacillus acidophilus 1 Tablet(s) Oral daily  latanoprost 0.005% Ophthalmic Solution 1 Drop(s) Both EYES at bedtime  multivitamin 1 Tablet(s) Oral daily  pantoprazole    Tablet 40 milliGRAM(s) Oral before breakfast  piperacillin/tazobactam IVPB.. 3.375 Gram(s) IV Intermittent every 8 hours  primidone 50 milliGRAM(s) Oral two times a day  propranolol 80 milliGRAM(s) Oral two times a day  rivaroxaban 10 milliGRAM(s) Oral daily  senna 2 Tablet(s) Oral at bedtime  tamsulosin 0.4 milliGRAM(s) Oral at bedtime  timolol 0.5% Solution 1 Drop(s) Both EYES daily  tiotropium 18 MICROgram(s) Capsule 1 Capsule(s) Inhalation daily  topiramate 25 milliGRAM(s) Oral two times a day    MEDICATIONS  (PRN):  acetaminophen   Tablet .. 650 milliGRAM(s) Oral every 6 hours PRN Temp greater or equal to 38C (100.4F), Mild Pain (1 - 3)  magnesium hydroxide Suspension 30 milliLiter(s) Oral daily PRN Constipation  ondansetron Injectable 4 milliGRAM(s) IV Push every 6 hours PRN Nausea and/or Vomiting  oxyCODONE    IR 10 milliGRAM(s) Oral every 4 hours PRN Severe Pain (7 - 10)  oxyCODONE    IR 5 milliGRAM(s) Oral every 4 hours PRN Moderate Pain (4 - 6)      REVIEW OF SYSTEMS:  CONSTITUTIONAL: No fever, weight loss, or fatigue  EYES: No eye pain, visual disturbances, or discharge  ENMT:  No difficulty hearing, tinnitus, vertigo; No sinus or throat pain  NECK: No pain or stiffness  RESPIRATORY:chronic non productive cough, worse after expectorants as per wife  CARDIOVASCULAR: No chest pain, palpitations, dizziness, or leg swelling  GASTROINTESTINAL: No abdominal or epigastric pain. No nausea, vomiting, or hematemesis; No diarrhea or constipation. No melena or hematochezia.  GENITOURINARY: No dysuria, frequency, hematuria, or incontinence  NEUROLOGICAL: chronic memory loss related to underlying dementia  SKIN: No itching, burning, rashes, or lesions   LYMPH NODES: No enlarged glands  ENDOCRINE: No heat or cold intolerance; No hair loss  MUSCULOSKELETAL: No joint pain or swelling; No muscle, back, or extremity pain  PSYCHIATRIC: No depression, anxiety, mood swings, or difficulty sleeping  HEME/LYMPH: No easy bruising, or bleeding gums  ALLERY AND IMMUNOLOGIC: No hives or eczema    RADIOLOGY & ADDITIONAL TESTS:    Imaging Personally Reviewed:  [x ] YES  [ ] NO    Consultant(s) Notes Reviewed:  [x ] YES  [ ] NO    PHYSICAL EXAM:  GENERAL: NAD, well-groomed, well-developed  HEAD:  Atraumatic, Normocephalic  EYES: EOMI, PERRLA, conjunctiva and sclera clear  ENMT: No tonsillar erythema, exudates, or enlargement; Moist mucous membranes, Good dentition, No lesions  NECK: Supple, No JVD, Normal thyroid  NERVOUS SYSTEM:  Alert & Oriented X2, poor concentration; Motor Strength 5/5 B/L upper and lower extremities; DTRs 2+ intact and symmetric  CHEST/LUNG: Clear to percussion bilaterally; No rales, rhonchi, wheezing, or rubs  HEART: Regular rate and rhythm; No murmurs, rubs, or gallops  ABDOMEN: Soft, Nontender, Nondistended; Bowel sounds present  EXTREMITIES:  2+ Peripheral Pulses, No clubbing, cyanosis, or edema  LYMPH: No lymphadenopathy noted  SKIN: No rashes or lesions    Care Discussed with Consultants/Other Providers [x ] YES  [ ] NO    advance care planning and advance directives discussed [x]YES  [  ]NO

## 2021-08-25 NOTE — PROGRESS NOTE ADULT - ASSESSMENT
86 year old male with PMH of dementia, essential tremor, COPD (not on home O2), severe scoliosis, HLD, hx of partial colon resection. He was BIBA s/p suspected mechanical fall and was found to have left proximal femur fracture, is being evaluated for Sinus shanti and surgical clearance    SVT  -  S/P mechanical fall and was found to have left proxima femur fracture  - Admission EKG shows sinus Shanti with HR of 48 with no ischemic changes.  -continue tele monitoring , yesterday with episodes of SVT x 2 in setting of missed bb spontaneously broke, overnight SB 59 no events   -continue home propanolol 80mg Po BID   -diffuse rhonchi on exam ,noted chest xray fu pulm recs ? aspiration   -sp iv lasix yesterday now with increase Cr would not further diurese today   - ECHO: preliminary read shows very limited acoustic window, technically difficult study, fu offical read     Ortho  -post op intramedullary nailing of femur  -fu ortho recs     Monitor and replete electrolytes. Keep K>4.0 and Mg>2.0.  Brandie Burkett FNP-C  Cardiology NP  [id number] [phone]

## 2021-08-25 NOTE — PROGRESS NOTE ADULT - SUBJECTIVE AND OBJECTIVE BOX
[facility] -- Leonard Mercado, Shaquille, Deana, Beau Mathis Savella  Office # [phone]      Follow Up:    SVT    Subjective/Observations:   Seen at bedside, more alert today complaints of generalize weakness   in no acute distress     REVIEW OF SYSTEMS: unable to provide any meaningful information     PAST MEDICAL & SURGICAL HISTORY:  Scoliosis    Chronic cough    Osteoporosis    Occasional tremors  severe essential tremors    Memory loss  dementia    COPD, severe    HLD (hyperlipidemia)    H/O partial resection of colon        MEDICATIONS  (STANDING):  ascorbic acid 500 milliGRAM(s) Oral two times a day  budesonide 160 MICROgram(s)/formoterol 4.5 MICROgram(s) Inhaler 2 Puff(s) Inhalation two times a day  folic acid 1 milliGRAM(s) Oral daily  lactobacillus acidophilus 1 Tablet(s) Oral daily  latanoprost 0.005% Ophthalmic Solution 1 Drop(s) Both EYES at bedtime  multivitamin 1 Tablet(s) Oral daily  pantoprazole    Tablet 40 milliGRAM(s) Oral before breakfast  piperacillin/tazobactam IVPB.. 3.375 Gram(s) IV Intermittent every 8 hours  primidone 50 milliGRAM(s) Oral two times a day  propranolol 80 milliGRAM(s) Oral two times a day  rivaroxaban 10 milliGRAM(s) Oral daily  senna 2 Tablet(s) Oral at bedtime  tamsulosin 0.4 milliGRAM(s) Oral at bedtime  timolol 0.5% Solution 1 Drop(s) Both EYES daily  tiotropium 18 MICROgram(s) Capsule 1 Capsule(s) Inhalation daily  topiramate 25 milliGRAM(s) Oral two times a day    MEDICATIONS  (PRN):  acetaminophen   Tablet .. 650 milliGRAM(s) Oral every 6 hours PRN Temp greater or equal to 38C (100.4F), Mild Pain (1 - 3)  magnesium hydroxide Suspension 30 milliLiter(s) Oral daily PRN Constipation  ondansetron Injectable 4 milliGRAM(s) IV Push every 6 hours PRN Nausea and/or Vomiting  oxyCODONE    IR 10 milliGRAM(s) Oral every 4 hours PRN Severe Pain (7 - 10)  oxyCODONE    IR 5 milliGRAM(s) Oral every 4 hours PRN Moderate Pain (4 - 6)      Allergies    azithromycin (Rash)    Intolerances    lactose (Diarrhea)      Vital Signs Last 24 Hrs  T(C): 37.3 (25 Aug 2021 04:49), Max: 37.3 (25 Aug 2021 04:49)  T(F): 99.1 (25 Aug 2021 04:49), Max: 99.1 (25 Aug 2021 04:49)  HR: 95 (25 Aug 2021 04:49) (67 - 95)  BP: 131/63 (25 Aug 2021 04:49) (87/55 - 139/73)  BP(mean): --  RR: 18 (25 Aug 2021 04:49) (18 - 20)  SpO2: 92% (25 Aug 2021 04:49) (92% - 100%)    I&O's Summary    24 Aug 2021 07:01  -  25 Aug 2021 07:00  --------------------------------------------------------  IN: 0 mL / OUT: 425 mL / NET: -425 mL          PHYSICAL EXAM:  TELE: SB 59   Constitutional: NAD, awake and alert, well-developed  HEENT: Moist Mucous Membranes, Anicteric  Pulmonary: Non-labored, breath sounds scattered rhonchi   Cardiovascular: Regular, S1 and S2 nl, No murmurs, rubs, gallops or clicks  Gastrointestinal: Bowel Sounds present, soft, nontender.   Lymph: trace peripheral edema.  Skin: No visible rashes or ulcers.  Psych:  Mood & affect appropriate    LABS: All Labs Reviewed:                        11.1   10.67 )-----------( 158      ( 25 Aug 2021 07:12 )             32.3                         12.6   11.33 )-----------( 179      ( 24 Aug 2021 12:06 )             38.1                         12.0   11.15 )-----------( 149      ( 23 Aug 2021 19:23 )             35.9     25 Aug 2021 07:12    138    |  103    |  29     ----------------------------<  105    3.6     |  25     |  1.40   24 Aug 2021 07:42    139    |  103    |  19     ----------------------------<  120    3.8     |  28     |  0.82   23 Aug 2021 19:23    139    |  108    |  22     ----------------------------<  108    3.8     |  26     |  0.78     Ca    7.9        25 Aug 2021 07:12  Ca    8.2        24 Aug 2021 07:42  Ca    7.9        23 Aug 2021 19:23  Phos  2.6       24 Aug 2021 12:06  Mg     1.8       24 Aug 2021 12:06    TPro  6.6    /  Alb  2.7    /  TBili  0.6    /  DBili  x      /  AST  16     /  ALT  23     /  AlkPhos  120    23 Aug 2021 06:55  TPro  7.2    /  Alb  2.9    /  TBili  0.5    /  DBili  x      /  AST  18     /  ALT  26     /  AlkPhos  132    22 Aug 2021 20:15             ECG:    Echo:    Radiology:

## 2021-08-25 NOTE — SWALLOW VFSS/MBS ASSESSMENT ADULT - ORAL PHASE
Delayed oral transit time/Reduced anterior - posterior transport/Uncontrolled bolus / spillover in hypopharynx Delayed oral transit time/Reduced anterior - posterior transport/Residue in oral cavity

## 2021-08-25 NOTE — DIETITIAN INITIAL EVALUATION ADULT. - PROBLEM SELECTOR PLAN 8
-multiple chronic findings on imaging   -Chronic stable similar to prior imaging: simple hepatic cysts largest measuring 5.7 x 5.2 cm in size.   -Liver hemangioma 2.3 x 1.9 cm.   -6 mm nonobstructing calculus in the lower pole of the left kidney and midpole of the left kidney.   -Diffuse bladder wall thickening of the of the urinary bladder most prominent at the posterior wall, and numerous bladder diverticulum again demonstrated.   -Small calcification at the posterior left bladder wall again demonstrated.  -f/u outpatient

## 2021-08-25 NOTE — SWALLOW VFSS/MBS ASSESSMENT ADULT - ADDITIONAL RECOMMENDATIONS
This department to continue to follow during this admission, as schedule permits. Continued swallowing therapy is recommended upon discharge from [facility] (rehab vs home care vs outpatient).

## 2021-08-25 NOTE — DIETITIAN INITIAL EVALUATION ADULT. - PROBLEM SELECTOR PLAN 7
-COPD not on home O2  -former smoker 50 years x 1-2 ppd  -CT chest mucoid impacted airways with chronic aspiration in the right lower lobe  -may benefit from pulm VENUS Bolton

## 2021-08-25 NOTE — SWALLOW VFSS/MBS ASSESSMENT ADULT - RECOMMENDED FEEDING/EATING TECHNIQUES
teaspoon presentations only!/allow for swallow between intakes/alternate food with liquid/check mouth frequently for oral residue/pocketing/crush medication (when feasible)/maintain upright posture during/after eating for 30 mins/no straws/oral hygiene/position upright (90 degrees)/provide rest periods between swallows/small sips/bites

## 2021-08-25 NOTE — CONSULT NOTE ADULT - SUBJECTIVE AND OBJECTIVE BOX
[facility], Division of Infectious Diseases  TRAVIS Baker, CINDY Hale  [phone]  ([phone] - weekdays after 5pm and weekends)    MELO PIKE  86y, Male  [id number]    HPI:  Patient is a 86 year old male with PMH of dementia, essential tremor, COPD not on home O2, severe scoliosis, HLD, hx of partial colon resection, here for mechanical fall at 18:30. Pt was walking from the bathroom to the kitchen and tripped. Occurred on carpeted floor, using his cane, landed on left side. No head trauma, no LOC. Pt denies palpitations, CP, SOB, dizziness, lightheadedness prior to fall. As per wife, pt has been his usual self. Pt has slow heart rate at rest, on propanolol for essential tremor. Patient is a poor historian and history is limited.  In the ED, VS: Tmax 99.5 , bp 159/69, RR 18, 91% on RA. Labs significant for alk p 132. CT C/A/P: Comminuted intertrochanteric fracture of the proximal left femur. Mucoid impacted airways with chronic aspiration in the right lower lobe. Chronic stable similar to prior imaging: simple hepatic cysts largest measuring 5.7 x 5.2 cm in size. Liver hemangioma 2.3 x 1.9 cm. 6 mm non-obstructing calculus in the lower pole of the left kidney and midpole of the left kidney. Diffuse bladder wall thickening of the of the urinary bladder most prominent at the posterior wall, and numerous bladder diverticulum again demonstrated. Small calcification at the posterior left bladder wall again demonstrated. EKG sinus shanti rate 48 bpm, no ischemic changes.  (22 Aug 2021 22:57)  Patient seen and examined at bedside this morning. Patient states he feels fine, has no complaints. Denies having any urinary complaints of dysuria, increased urinary frequency, hesitancy, urgency at home or since admission. States he just fell at home while walking to the bathroom and feels he tripped. He denies fever, chills, cough, chest pain, dyspnea, abdominal pain, nausea, vomiting, diarrhea.  ROS: 14 point review of systems completed, pertinent positives and negatives as per HPI.    Allergies: azithromycin (Rash)    PMH -- Scoliosis  Chronic cough  COPD, mild  Osteoporosis  Occasional tremors  Memory loss  COPD, severe  HLD (hyperlipidemia)    PSH -- H/O partial resection of colon  FH -- FHx: dementia (Mother)  FH: colon cancer (Father)  FH: Parkinson's disease (Sibling)  FH: stroke (Sibling)    Social History --former smoker - smokes 1-2PPD for 50 yrs and quit 20 years ago, denies alcohol or illicit drug use    Physical Exam--  Vital Signs Last 24 Hrs  T(F): 99.1 (25 Aug 2021 04:49), Max: 99.1 (25 Aug 2021 04:49)  HR: 95 (25 Aug 2021 04:49) (67 - 95)  BP: 131/63 (25 Aug 2021 04:49) (87/55 - 139/73)  RR: 18 (25 Aug 2021 04:49) (18 - 20)  SpO2: 92% (25 Aug 2021 04:49) (92% - 100%)  General: no acute distress, NC  HEENT: NC/AT, EOMI, anicteric, conjunctiva pink and moist, neck supple  Lungs: Clear bilaterally without rales, wheezing or rhonchi  Heart: Regular rate and rhythm. No murmur, rub or gallop.  Abdomen: Soft. Nondistended. Nontender. BS present.  Neuro: awake, alert, answers and follows, no obvious focal deficits   Extremities: No cyanosis. No edema.   Skin: Warm. Dry. Good turgor. No visible rash.  Psychiatric: Appropriate affect and mood for situation.   Lines: PIV    Laboratory & Imaging Data--  CBC:                       11.1   10.67 )-----------( 158      ( 25 Aug 2021 07:12 )             32.3     WBC Count: 10.67 K/uL (08-25-21 @ 07:12)  WBC Count: 11.33 K/uL (08-24-21 @ 12:06)  WBC Count: 11.15 K/uL (08-23-21 @ 19:23)  WBC Count: 8.06 K/uL (08-23-21 @ 06:55)  WBC Count: 6.75 K/uL (08-22-21 @ 20:15)    CMP: 08-25    138  |  103  |  29<H>  ----------------------------<  105<H>  3.6   |  25  |  1.40<H>    Ca    7.9<L>      25 Aug 2021 07:12  Phos  2.6     08-24  Mg     1.8     08-24    Microbiology:   Culture - Blood (collected 08-23-21 @ 06:13)  Source: .Blood Blood-Peripheral  Preliminary Report (08-24-21 @ 07:01):    No growth to date.    Culture - Blood (collected 08-23-21 @ 06:13)  Source: .Blood Blood-Peripheral  Preliminary Report (08-24-21 @ 07:01):    No growth to date.    Culture - Urine (collected 08-23-21 @ 05:40)  Source: Clean Catch Clean Catch (Midstream)  Preliminary Report (08-24-21 @ 15:31):    >100,000 CFU/ml Coag Negative Staphylococcus "Susceptibilities not    performed"    Radiology--  Xray Chest 1 View- PORTABLE-Urgent (Xray Chest 1 View- PORTABLE-Urgent .) (08.24.21 @ 09:34) > INTERPRETATION:  Chest portable. Clinical History: Supraventricular tachycardia. Comparison: 8/22/2021. Single AP view submitted. The patient is rotated. The evaluation of the cardiomediastinal silhouette is limited on portable technique. Mildly tortuous, calcified aorta. Low lung volumes are present. There is a probable small right-sided pleural effusion with underlying right infrahilar/lower lobe airspace consolidation. There is patchy consolidation at the left lung base. Impression: Findings as discussed above.    CT Chest, Abd/Pelvis w/ IV Cont (08.22.21 @ 21:33) >IMPRESSION: Comminuted intertrochanteric fracture of the proximal left femur. Mucoid impacted airways with chronic aspiration in the right lower lobe. Other chronic ancillary findings as above without change from prior exams.    CT Head No Cont (08.22.21 @ 21:32) >IMPRESSION: 1.  No acute intracranial disease.     Active Medications--  acetaminophen   Tablet .. 650 milliGRAM(s) Oral every 6 hours PRN  ascorbic acid 500 milliGRAM(s) Oral two times a day  budesonide 160 MICROgram(s)/formoterol 4.5 MICROgram(s) Inhaler 2 Puff(s) Inhalation two times a day  folic acid 1 milliGRAM(s) Oral daily  lactobacillus acidophilus 1 Tablet(s) Oral daily  latanoprost 0.005% Ophthalmic Solution 1 Drop(s) Both EYES at bedtime  magnesium hydroxide Suspension 30 milliLiter(s) Oral daily PRN  multivitamin 1 Tablet(s) Oral daily  ondansetron Injectable 4 milliGRAM(s) IV Push every 6 hours PRN  oxyCODONE    IR 10 milliGRAM(s) Oral every 4 hours PRN  oxyCODONE    IR 5 milliGRAM(s) Oral every 4 hours PRN  pantoprazole    Tablet 40 milliGRAM(s) Oral before breakfast  piperacillin/tazobactam IVPB.. 3.375 Gram(s) IV Intermittent every 8 hours  primidone 50 milliGRAM(s) Oral two times a day  propranolol 80 milliGRAM(s) Oral two times a day  rivaroxaban 10 milliGRAM(s) Oral daily  senna 2 Tablet(s) Oral at bedtime  tamsulosin 0.4 milliGRAM(s) Oral at bedtime  timolol 0.5% Solution 1 Drop(s) Both EYES daily  tiotropium 18 MICROgram(s) Capsule 1 Capsule(s) Inhalation daily  topiramate 25 milliGRAM(s) Oral two times a day    Antimicrobials:   piperacillin/tazobactam IVPB.. 3.375 Gram(s) IV Intermittent every 8 hours - started 8/24  s/p ceftriaxone 8/22-8/23  s/p cefazolin 8/23

## 2021-08-25 NOTE — SWALLOW VFSS/MBS ASSESSMENT ADULT - DIAGNOSTIC IMPRESSIONS
1. The patient demonstrated a mild oral dysphagia for puree and honey thick liquids marked by adequate oral acceptance with delayed bolus collection, transfer, and AP transit time. Trace lingual and palatal residue noted subsequent to deglutition which reduced with a subsequent swallow.  2. The patient demonstrated a mild-moderate oral dysphagia for nectar thick liquids marked by adequate oral acceptance with delayed bolus collection and transfer with uncontrolled spillover to the hypopharynx.   3. The patient demonstrated a mild pharyngeal dysphagia for puree and honey thick liquids marked by a delayed pharyngeal swallow trigger with reduced base of tongue retraction, reduced epiglottic deflection, reduced hyolaryngeal elevation, and reduced pharyngeal contractility resulting in trace-mild stasis along the base of tongue, in the vallecula, along the posterior pharyngeal wall, and in the pyriforms which reduced with a subsequent swallow. No laryngeal penetration/aspiration visualized.  4.  The patient demonstrated a severe pharyngeal dysphagia for nectar thick liquids marked by a delayed pharyngeal swallow trigger (bolus head migrating into the laryngeal vestibule) with reduced base of tongue retraction, reduced epiglottic deflection, reduced hyolaryngeal elevation, reduced pharyngeal contractility, and incomplete closure of the laryngeal vestibule resulting in deep, silent laryngeal penetration to the level of the vocal folds before and during the swallow without retrieval leaving residual barium in the upper airway. Patient cued to cough which was not successful in clearing barium from the upper airway. Compensatory strategies were unsuccessful in providing adequate airway protection. Trace-mild stasis visualized along the base of tongue, in the vallecula, along the posterior pharyngeal wall, and in the pyriforms which reduced with a subsequent swallow.

## 2021-08-25 NOTE — DIETITIAN INITIAL EVALUATION ADULT. - PROBLEM SELECTOR PLAN 5
-hx of severe essential tremor  -continue home topamax, Primidone,   -hold home propanolol given sinus shanti and syncope with possible cardiac etiology

## 2021-08-25 NOTE — PROGRESS NOTE ADULT - SUBJECTIVE AND OBJECTIVE BOX
Neurology follow up note    MELO [id number]      Interval History:    Patient feels tired events noted yesterday     Allergies    azithromycin (Rash)    Intolerances    lactose (Diarrhea)      MEDICATIONS    acetaminophen   Tablet .. 650 milliGRAM(s) Oral every 6 hours PRN  ascorbic acid 500 milliGRAM(s) Oral two times a day  budesonide 160 MICROgram(s)/formoterol 4.5 MICROgram(s) Inhaler 2 Puff(s) Inhalation two times a day  folic acid 1 milliGRAM(s) Oral daily  lactobacillus acidophilus 1 Tablet(s) Oral daily  latanoprost 0.005% Ophthalmic Solution 1 Drop(s) Both EYES at bedtime  magnesium hydroxide Suspension 30 milliLiter(s) Oral daily PRN  multivitamin 1 Tablet(s) Oral daily  ondansetron Injectable 4 milliGRAM(s) IV Push every 6 hours PRN  oxyCODONE    IR 10 milliGRAM(s) Oral every 4 hours PRN  oxyCODONE    IR 5 milliGRAM(s) Oral every 4 hours PRN  pantoprazole    Tablet 40 milliGRAM(s) Oral before breakfast  piperacillin/tazobactam IVPB.. 3.375 Gram(s) IV Intermittent every 8 hours  primidone 50 milliGRAM(s) Oral two times a day  propranolol 80 milliGRAM(s) Oral two times a day  rivaroxaban 10 milliGRAM(s) Oral daily  senna 2 Tablet(s) Oral at bedtime  tamsulosin 0.4 milliGRAM(s) Oral at bedtime  timolol 0.5% Solution 1 Drop(s) Both EYES daily  tiotropium 18 MICROgram(s) Capsule 1 Capsule(s) Inhalation daily  topiramate 25 milliGRAM(s) Oral two times a day              Vital Signs Last 24 Hrs  T(C): 37.3 (25 Aug 2021 04:49), Max: 37.3 (25 Aug 2021 04:49)  T(F): 99.1 (25 Aug 2021 04:49), Max: 99.1 (25 Aug 2021 04:49)  HR: 95 (25 Aug 2021 04:49) (67 - 95)  BP: 131/63 (25 Aug 2021 04:49) (87/55 - 139/73)  BP(mean): --  RR: 18 (25 Aug 2021 04:49) (18 - 20)  SpO2: 92% (25 Aug 2021 04:49) (92% - 100%)      REVIEW OF SYSTEMS:  Constitutional:  The patient denies fever, chills, or night sweats.  Head:  No headache.  Eyes:  No double vision or blurry vision.  Ears:  No ringing in his ears.  Neck:  No neck pain.  Respiratory:  No shortness of breath.  Cardiovascular:  No chest pain.  Abdomen:  No nausea, vomiting, or abdominal pain.  Extremities/Neurological:  No numbness or tingling.  Musculoskeletal:  Positive pain especially in the left hip status post surgical intervention.  Genitourinary:  No burning upon urination.    PHYSICAL EXAMINATION:   HEENT:  Head:  Normocephalic and atraumatic.  Eyes:  No scleral icterus.  Ears:  Hearing bilaterally intact.  NECK:  Supple.  RESPIRATORY:  Good air entry bilaterally.  CARDIOVASCULAR:  S1 and S2 heard.  ABDOMEN:  Soft and nontender.  EXTREMITIES:  No clubbing or cyanosis was noted.      NEUROLOGIC:  The patient is awake and alert.  Positive signs of forgetfulness upon conversing with the patient.  The patient has dementia.  Extraocular movements were intact.  Speech was fluent.  Smile was symmetric.  Motor:  Bilateral upper were 3+/5, right lower 3-/5, left lower had decreased range of motion of the hip status post surgical intervention, distally was 3/5.  Positive tremors were noted in bilateral hands resting and they appeared to be continued with movement.  Positive mouth tremor was noted.  No cogwheel rigidity was noted.            LABS:  CBC Full  -  ( 25 Aug 2021 07:12 )  WBC Count : 10.67 K/uL  RBC Count : 3.61 M/uL  Hemoglobin : 11.1 g/dL  Hematocrit : 32.3 %  Platelet Count - Automated : 158 K/uL  Mean Cell Volume : 89.5 fl  Mean Cell Hemoglobin : 30.7 pg  Mean Cell Hemoglobin Concentration : 34.4 gm/dL  Auto Neutrophil # : x  Auto Lymphocyte # : x  Auto Monocyte # : x  Auto Eosinophil # : x  Auto Basophil # : x  Auto Neutrophil % : x  Auto Lymphocyte % : x  Auto Monocyte % : x  Auto Eosinophil % : x  Auto Basophil % : x      08-25    138  |  103  |  29<H>  ----------------------------<  105<H>  3.6   |  25  |  1.40<H>    Ca    7.9<L>      25 Aug 2021 07:12  Phos  2.6     08-24  Mg     1.8     08-24      Hemoglobin A1C:       Vitamin B12         RADIOLOGY      ANALYSIS AND PLAN:  This is an 86-year-old with an episode of fall, tremors, and altered mental status.  For episode of fall, this appeared to be mechanical in nature as per conversation with the spouse, no seizure-like activity was reported and no syncopal event.  For altered mental status, I suspect most likely metabolic encephalopathy, possible underlying urinary tract infection along with dementia becoming more prominent in the hospital setting.  Antibiotics as needed.  For essential tremors which appeared to be more prominent most likely secondary to the hospital setting and undergoing procedures in a different environment, stressors would exacerbate essential tremors.  The patient appears to be on medications for essential tremors.  I would not increase them at present.  The patient does have a history of becoming lethargic, most likely from his Primidone.  For history of dementia, continue the patient on his Aricept.  For history of high cholesterol, continue the patient on his statin.  monitor SBP if possible keep sbp above 100     Spoke with the spouseFelicia at [phone], alternating number is [phone]. 8/25  She understands the reasoning and thought process and is agreeable with the continuation of the current medications.  They will follow up with her outside neurologist.    Greater than 40 minutes of time was spent with the patient, plan of care, reviewing data, speaking to the family and multidisciplinary healthcare team with greater than 50% of that time in counseling and care coordination.    Thank you for the courtesy of this consultation.

## 2021-08-25 NOTE — DIETITIAN INITIAL EVALUATION ADULT. - PROBLEM SELECTOR PLAN 4
-hx of dementia  -as per wife pt may have sundowning   -will get speech and swallow day after surgery to assess for diet recs  -continue home Aricept

## 2021-08-25 NOTE — DIETITIAN INITIAL EVALUATION ADULT. - PROBLEM/PLAN-4
Patient: Michelle Parikh   : 1958   MRN: [id number]     Date of visit: 2018    Reason for consultation: Michelle Parikh was seen on 18 by Dr. Howe and myself in Neurosurgical consultation at [facility].  She was seen at the request of KYREE Hunter for evaluation and opinion of back and bilateral leg pain.    History of Present Illness: Michelle Parikh is a 60 year old female with a history of chronic low back pain across the lumbosacral region of the spine.  Pain radiates into the bilateral buttocks, hips, and posterior thighs.  Her back is sore when she moves around and with sitting.  Her legs bother her more when she is standing and walking, though she does have some pain in her legs with sitting.  She is most comfortable lying down.  She was having more pain in her legs and was unable to walk because of it, however her leg pain improved after a couple of epidural steroid injections with Dr. Saleh in July.  Her back pain did not change with the injections.     In terms of treatments she has tried chiropractic therapy, injections, Zanaflex and Lyrica. She is starting physical therapy for her neck tomorrow, but has not tried therapy for her back. She denies any bowel or bladder dysfunction or previous injuries or surgeries to the lumbar spine.    Pertinent past medical history: BMI of 46, type 1 diabetes mellitus, atrial fibrillation, congestive heart failure, chronic kidney disease, asthma, diabetic neuropathy, history of stroke, history of heart attack, fibromyalgia, GERD.     Past Medical History:   Diagnosis Date   • Anxiety    • Arthritis    • Asthma    • Atrial fibrillation (CMS/[facility])    • Wills's esophagus 2016   • Benign essential HTN    • Broken ribs 2016   • Bronchitis    • C2 cervical fracture (CMS/HCC)    • Cerebral infarction (CMS/HCC)     WEAKNESS LEFT SIDE      • CHF (congestive heart failure) (CMS/HCC)    • Chronic headaches    • Chronic pain    • Coronary artery  disease    • CVA (cerebral infarction)    • Depression    • Diabetes (CMS/HCC)    • Esophageal reflux    • Fibromyalgia    • Fracture     Left arm   • GERD (gastroesophageal reflux disease)    • High cholesterol    • Hypothyroidism    • Kidney calculi     Stage 3 CKD.    • Mental disorder    • Myocardial infarction (CMS/HCC)    • Neuropathy of leg    • Obesity    • Other abnormal clinical finding     arthrosc   • Pneumonia    • Sinusitis, chronic    • Type 1 diabetes mellitus (CMS/HCC)    • Urinary incontinence     controlled with Vesicare   • Urinary tract infection        Past Surgical History:   Procedure Laterality Date   • Appendectomy     • Cabg, arterial, two  2011   • Carpal tunnel release Bilateral    • Cataract extraction extracapsular w/ intraocular lens implantation Bilateral 2005   •  delivery+postpartum care      ; X2   • Colonoscopy  2017    due in 5 years.    • Colonoscopy w biopsy  16    repeat in 5 years   • Coronary artery bypass graft      2   • Esophagogastroduodenoscopy  11/18/15    repeat in 1 year, +IM   • Esophagogastroduodenoscopy  16    repeat in 1 year, +IM   • Esophagogastroduodenoscopy  2016    Repeat in 1 year   • Esophagogastroduodenoscopy  2018    Repeat in 3 years - Dr. Rose   • Esophagogastroduodenoscopy transoral flex w/bx single or mult  2011    EGD with Bx   • Hysterectomy  2007    total   • Kidney surgery       RIGHT SIDE      • Ptca with stent  2011   • Removal gallbladder      Cholecystectomy (gallbladder)   • Rx retinopathy progressv,photocoag   x2 OD ,OS x6     Laser, Pan Retinal Photocoagulation   • Trigger finger release     • Wrist surgery         Current Outpatient Prescriptions   Medication Sig Dispense Refill   • tiZANidine (ZANAFLEX) 4 MG tablet Take 1 tablet by mouth every 8 hours as needed (spasm). Changed due to ins coverage. 20 tablet 1   • Ferrous Sulfate (IRON) 325 (65 Fe)  MG Tab      • ondansetron (ZOFRAN ODT) 4 MG disintegrating tablet Place 1 tablet onto the tongue every 8 hours as needed for Nausea. 15 tablet 1   • buPROPion (WELLBUTRIN XL) 150 MG 24 hr tablet TAKE 1 TABLET BY MOUTH DAILY 90 tablet 0   • lidocaine (LIDODERM) 5 % patch Place 1 patch onto the skin every 12 hours. Remove patch 12 hours after applying 4 patch 0   • lisinopril (ZESTRIL) 10 MG tablet Take 1 tablet by mouth daily. 90 tablet 1   • pramipexole (MIRAPEX) 0.25 MG tablet 1/2 tab po hs x 6 days then may increase to 1tab qhs if leg motion not improved 30 tablet 3   • pregabalin (LYRICA) 200 MG capsule Take 1 capsule by mouth 2 times daily. 60 capsule 1   • traMADol (ULTRAM) 50 MG tablet Take 1 tablet by mouth every 8 hours as needed for Pain. 30 tablet 0   • atorvastatin (LIPITOR) 80 MG tablet TAKE 1 TABLET BY MOUTH EVERY EVENING 90 tablet 1   • calcitonin (MIACALCIN) 200 UNIT/ACT nasal spray SPRAY ONE SPRAY IN ALTERNATING NOSTRILS DAILY 3.7 mL 2   • ALPRAZolam (XANAX) 1 MG tablet TAKE 1 TABLET BY MOUTH THREE TIMES DAILY AS NEEDED FOR ANXIETY 45 tablet 0   • Insulin Glargine (LANTUS SC) Inject 14 Units into the skin 2 times daily.     • protriptyline (VIVACTIL) 5 MG tablet Take one tab nightly for one week; then one tab BID for one week; then two tabs in the am and one at night; then two tabs BID. 120 tablet 1   • levothyroxine (SYNTHROID, LEVOTHROID) 25 MCG tablet TAKE 1 TABLET BY MOUTH DAILY 90 tablet 1   • apixaban (ELIQUIS) 5 MG Tab Take 1 tablet by mouth every 12 hours. 60 tablet 6   • levothyroxine (SYNTHROID, LEVOTHROID) 25 MCG tablet Take 1 tablet by mouth daily. 90 tablet 1   • carvedilol (COREG) 3.125 MG tablet Take 1 tablet by mouth 2 times daily. 180 tablet 1   • PARoxetine (PAXIL) 40 MG tablet Take 1 tablet by mouth daily. 90 tablet 1   • pantoprazole (PROTONIX) 40 MG tablet Take 1 tablet by mouth daily. 90 tablet 3   • docusate sodium (COLACE) 100 MG capsule Take 1 tablet by mouth every morning  and 2 tablets every evening     • diphenhydrAMINE (BENADRYL) 25 MG tablet Take 25 mg by mouth nightly as needed for Sleep.     • ascorbic acid (VITAMIN C) 250 MG tablet Take 250 mg by mouth daily.      • biotin 10 MG tablet Take 1 tablet twice daily     • solifenacin (VESICARE) 10 MG tablet Take 10 mg by mouth daily.     • Riboflavin 100 MG Tab Take 1 tablet by mouth daily.      • albuterol 108 (90 BASE) MCG/ACT inhaler Inhale 2 puffs into the lungs every 4 hours as needed for Shortness of Breath or Wheezing. 1 Inhaler 5   • insulin lispro (HUMALOG KWIKPEN) 100 UNIT/ML pen-injector 5 Units. Inject 4 units base after each meal + sliding (careful with carbs - limit 45-60 grams)  3 units for blood sugars:    6 unit for blood sugars: 111-159   7 units for blood sugars: 160-184   8 units for blood sugars: 185-209   9 units for blood sugars: 210-234   10 units for blood sugars: 235-259   11 units for blood sugars: 260- 284  12 units for blood sugars 285-309  13 units for blood sugars 310-334  14 units for blood sugars 335-359  15 units for blood sugars 360-384  16 units for blood sugars 385-408  17 units for blood sugars 409 or higher 15 mL 5   • aspirin 81 MG chewable tablet Chew 81 mg by mouth daily.     • GLUCAGON EMERGENCY 1 MG injection kit INJECT 1 MG INTO THE MUSCLE ONCE FOR 1 DOSE 3 kit 3   • benzocaine (ORAJEL) 10 % oral gel Apply topically 3 to 4 times daily 9 g 0   • hydroCORTisone (ANUSOL-HC) 2.5 % rectal cream Apply rectally bid x 1 week. 30 g 0   • mometasone (ASMANEX 30 METERED DOSES) 220 MCG/INH inhaler Inhale 1 puff into the lungs Every evening. 1 Inhaler 11     No current facility-administered medications for this visit.           ALLERGIES:   Allergen Reactions   • Coconut RASH   • Codeine GI UPSET   • Bactrim Ds Other (See Comments)     Mouth sores    • Hydrocodone VOMITING       Social History     Social History   • Marital status:      Spouse name: N/A   • Number of children: 1   • Years  of education: 16     Occupational History   • Unemployed      due to health issues     Social History Main Topics   • Smoking status: Former Smoker     Packs/day: 0.10     Years: 2.00     Types: Cigarettes     Quit date: 1980   • Smokeless tobacco: Former User   • Alcohol use No   • Drug use: No      Comment: Coffee 1 cup per day   • Sexual activity: Not on file     Other Topics Concern   •  Service No   • Blood Transfusions Yes     possibly with heart surgery   • Sleep Concern Yes     taking Trazodone   • Stress Concern Yes   • Weight Concern Yes     working on losing weight   • Special Diet Yes     taking diet shakes, watches calorie intake   • Back Care Yes   • Exercise Yes     does exercises with home health, PT   • Bike Helmet No     doesn't bike   • Seat Belt Yes     Social History Narrative    , P: 1, one living daughter and one daughter  at 8 days old. . 2-3 servings of dairy per day. Minimal coffee. 2 cans of soda per day. No tea. Exercises by doing PT exercises/stretches with home health.       Family History   Problem Relation Age of Onset   • Cataracts Mother    • Coronary Artery Disease Mother    • Osteoarthritis Mother    • Depression Mother    • Heart disease Mother    • Hypertension Mother    • Hyperlipidemia Mother    • Heart disease Sister    • Osteoarthritis Sister    • Depression Sister    • Hypertension Sister    • Hyperlipidemia Sister    • Asthma Brother    • Osteoarthritis Brother    • Depression Brother    • Hyperlipidemia Brother    • Dementia/Alzheimers Father    • Asthma Father    • Osteoarthritis Father    • Gastrointestinal Father    • Heart disease Father    • Hypertension Father    • Hyperlipidemia Father    • Diabetes Other         maternal grandmother   • Diabetes Other         paternal grandmother   • Blindness Other         grandmother   • Diabetes Daughter    • Gastrointestinal Daughter    • Hyperlipidemia Daughter    • Kidney disease Daughter         Review of Systems:  Positive for: Congestion, ear discharge, ear pain, rhinorrhea, trouble swallowing, abdominal pain, photophobia, visual disturbance, urinary incontinence, arthralgias, back pain, gait problems, joint swelling, myalgias, neck pain/stiffness, environmental allergies, dizziness, headaches, numbness, weakness, agitation.  Fourteen systems have been reviewed, all are negative aside from positives listed above or otherwise mentioned in HPI or Past Medical History.    Physical Exam:   VITAL SIGNS: Blood pressure 110/60, pulse 60, temperature 97.1 °F (36.2 °C), temperature source Temporal Artery, resp. rate 18, height 5' 1\" (1.549 m), weight 111.1 kg. Body mass index is 46.29 kg/m².   GENERAL:Patient is awake, alert, orientated and in no apparent distress. Mood and affect are appropriate.  HEENT: head normocephalic, atraumatic. Mucosal membranes moist. Cranial nerves grossly intact. Neck is supple.  CARDIOVASCULAR: regular rate and rhythm. No carotid bruits.  LUNGS: lungs CTA bilaterally   ABDOMEN: soft, nontender  NEURO: Gait is steady.  Heel and toe walk are intact.  There is no visible deformity or swelling in the back. Back has limited range of motion. Patient is nontender over the lumbar spinous processes, paraspinals, SI joints, sciatic notches. No pain with internal/external rotation of the hips. Straight leg raise is negative bilaterally. Strength is 5/5 bilaterally with hip flexion, quadriceps, tibialis anterior, extensor hallucis longus, gastrocs. DTRs are 0/4 in the bilateral patellar and Achilles tendons.     Imaging/Diagnostics:   MRI lumbar spine from 6/26/18 and [facility] was reviewed. This demonstrates diffuse broad-based disc bulging as well as facet arthropathy at L3-4, L4-5, L5-S1. At L4 5 there is severe bilateral facet arthropathy and marked ligamentum flavum hypertrophy. In combination, this causes severe central canal stenosis at L4 5. There is otherwise no  DISPLAY PLAN FREE TEXT significant central canal stenosis seen throughout the lumbar spine.    Impression/Plan:  This is a 60 year old year old woman seen in conjunction with Dr. Howe for low back and bilateral leg pain. 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 Her leg pain seems to be better following the epidural steroid injection. Due to continued back pain, she'll be referred for bilateral facet injection L4-5 as well as physical therapy.     We discussed a lumbar laminectomy at L4-5 as her surgical alternative. Prior to proceeding with surgery, Dr. Howe would like her BMI less than 40. This would mean her weight being under 210 pounds or 95.5 kg. She has recently lost 30 pounds, and feels confident she should be able to do this. Due to her medical comorbidities, prior to scheduling surgery she would also need medical clearance.      Cristina Kothari PA-C      CC:     PCP:  Damaso Pablo MD   [address] [facility] [address]   Phone: [phone]   Fax: [phone]     Referring Provider:  KYREE Hunter  [address] [facility] [address] [address]   Phone: [phone]   Fax: [phone]

## 2021-08-25 NOTE — CHART NOTE - NSCHARTNOTEFT_GEN_A_CORE
Called by RN for patient sounding rhonchus, similar complaints by pulmonology today, reporting COPD with some mucus plug.   Will try duoneb and reevaluate pt.

## 2021-08-25 NOTE — PROGRESS NOTE ADULT - SUBJECTIVE AND OBJECTIVE BOX
Pt seen and examined at bedside this AM. Pt states pain well controlled. Denies CP/SOB/fever/chills/n/v/numbness/tingling LLE. Last night medicine was contacted regarding agitation and continuously removing pulse ox. Pt was given melatonin.     Yesterday morning at approximately 0850, for increased HR and decreased BP. Pt was tachycardic to 180s, BP 76/55 --> 122/71. Tachycardia spontaneously broke.     VITAL SIGNS:  T(C): 37.3 (08-25-21 @ 04:49), Max: 37.3 (08-25-21 @ 04:49)  HR: 95 (08-25-21 @ 04:49) (67 - 108)  BP: 131/63 (08-25-21 @ 04:49) (87/55 - 139/73)  RR: 18 (08-25-21 @ 04:49) (17 - 20)  SpO2: 92% (08-25-21 @ 04:49) (90% - 100%)      LABS:                        12.6   11.33 )-----------( 179      ( 24 Aug 2021 12:06 )             38.1     08-24    139  |  103  |  19  ----------------------------<  120<H>  3.8   |  28  |  0.82    Ca    8.2<L>      24 Aug 2021 07:42  Phos  2.6     08-24  Mg     1.8     08-24    TPro  6.6  /  Alb  2.7<L>  /  TBili  0.6  /  DBili  x   /  AST  16  /  ALT  23  /  AlkPhos  120  08-23    PT/INR - ( 23 Aug 2021 08:41 )   PT: 12.4 sec;   INR: 1.06 ratio         PTT - ( 23 Aug 2021 08:41 )  PTT:30.9 sec        PE:   GEN: NAD, resting in bed  LLE: 2x aquacel's in place over surgical incisions, c/d/i  L2-S1 SILT  motor grossly intact, +TA/EHL/FHL/GSC  + DP pulses  compartments soft and compressible b/l  calves nontender b/l Pt seen and examined at bedside this AM. Pt states pain well controlled. Denies CP/SOB/fever/chills/n/v/numbness/tingling LLE. Last night medicine was contacted regarding agitation and continuously removing pulse ox. Pt was given melatonin.     Yesterday morning at approximately 0850, rapid response was called for increased HR and decreased BP. Pt was in SVT with HR to 180s, BP 76/55 --> 122/71. Tachycardia spontaneously broke.     VITAL SIGNS:  T(C): 37.3 (08-25-21 @ 04:49), Max: 37.3 (08-25-21 @ 04:49)  HR: 95 (08-25-21 @ 04:49) (67 - 108)  BP: 131/63 (08-25-21 @ 04:49) (87/55 - 139/73)  RR: 18 (08-25-21 @ 04:49) (17 - 20)  SpO2: 92% (08-25-21 @ 04:49) (90% - 100%)      LABS:                        12.6   11.33 )-----------( 179      ( 24 Aug 2021 12:06 )             38.1     08-24    139  |  103  |  19  ----------------------------<  120<H>  3.8   |  28  |  0.82    Ca    8.2<L>      24 Aug 2021 07:42  Phos  2.6     08-24  Mg     1.8     08-24    TPro  6.6  /  Alb  2.7<L>  /  TBili  0.6  /  DBili  x   /  AST  16  /  ALT  23  /  AlkPhos  120  08-23    PT/INR - ( 23 Aug 2021 08:41 )   PT: 12.4 sec;   INR: 1.06 ratio         PTT - ( 23 Aug 2021 08:41 )  PTT:30.9 sec        PE:   GEN: NAD, resting in bed  LLE: 2x aquacel's in place over surgical incisions, c/d/i  L2-S1 SILT  motor grossly intact, +TA/EHL/FHL/GSC  + DP pulses  compartments soft and compressible b/l  calves nontender b/l

## 2021-08-25 NOTE — PROGRESS NOTE ADULT - ASSESSMENT
87 yo M s/p MF w/ Left IT fx with subtrochanteric extension POD2    -Medical management appreciated  -DVT ppx w/ Xarelto & SCDs  -WBAT  -PT/OT  -FU AM Labs  -Pain control PRN  -ICE, rest, elevation PRN  -Incentive spirometry  -Dispo: PT rec Rehab  -Will discuss with attending Dr. Henriquez and advise if any changes to plan

## 2021-08-25 NOTE — PROGRESS NOTE ADULT - SUBJECTIVE AND OBJECTIVE BOX
PULMONARY/CRITICAL CARE    DOS 21   Denies sob. Intermittently confused.   Patient is a 86y old  Male who presents with a chief complaint of left hip fracture (23 Aug 2021 08:25)    BRIEF HOSPITAL COURSE: ***86 M PMHX dementia, essential tremor, COPD not on home O2, severe scoliosis, HLD, hx of partial colon resection, here for mechanical fall at 18:30. Pt was walking from the bathroom to the kitchen and tripped. Occurred on carpeted floor, using his cane, landed on left side. No head trauma, no LOC. Pt denies palpitations, CP, SOB, dizziness, lightheadedness prior to fall. As per wife, pt has been his usual self. Pt has slow heart rate at rest, on propanolol for essential tremor. Patient is a poor historian and history is limited.    In the ED,  VS: Tmax 99.5 , bp 159/69, RR 18, 91% on RA. Labs significant for alk p 132.   CT C/A/P: Comminuted intertrochanteric fracture of the proximal left femur. Mucoid impacted airways with chronic aspiration in the right lower lobe. Chronic stable similar to prior imaging: simple hepatic cysts largest measuring 5.7 x 5.2 cm in size. Liver hemangioma 2.3 x 1.9 cm. 6 mm non-obstructing calculus in the lower pole of the left kidney and midpole of the left kidney. Diffuse bladder wall thickening of the of the urinary bladder most prominent at the posterior wall, and numerous bladder diverticulum again demonstrated. Small calcification at the posterior left bladder wall again demonstrated.  EKG sinus shanti rate 48 bpm, no ischemic changes.  (22 Aug 2021 22:57)    Interval HPI: Patient is a 86 year old male with PMH of dementia, essential tremor, COPD (not on home O2), severe scoliosis, HLD, hx of partial colon resection. He was BIBA s/p suspected mechanical fall; pt states he was walking from the bathroom to the kitchen when he tripped. Pt landed on his left side injuring his L rib cage and L proximal femur; pt denies head trauma, LOC. Pt seen and evaluated at bedside at 8:15 today. HR at 57 bpm at time of exam. Pt rates pain of left hip at 8/10 and denies any pain of L ribs. EKG HR 48 bpm, no ischemic changes. Pt denies palpitations, CP, SOB, dizziness, lightheadedness. Pt denies any cardiac Hx and reports he has never seen a Cardiologist. Pt takes propranolol for essential tremor. Of note, pt presented to the [facility] ED in 2018 when marked sinus bradycardia of 43 bpm was noted on EKG.       Events last 24 hours: ***    PAST MEDICAL & SURGICAL HISTORY:  Scoliosis    Chronic cough    Osteoporosis    Occasional tremors  severe essential tremors    Memory loss  dementia    COPD, severe    HLD (hyperlipidemia)    H/O partial resection of colon      Allergies    azithromycin (Rash)    Intolerances      FAMILY HISTORY: distant smoker, no etoh  FHx: dementia (Mother)    FH: colon cancer (Father)    FH: Parkinson&#x27;s disease (Sibling)    FH: stroke (Sibling)          Medications:  cefTRIAXone   IVPB 1000 milliGRAM(s) IV Intermittent every 24 hours  cefTRIAXone   IVPB        tamsulosin 0.4 milliGRAM(s) Oral at bedtime    albuterol/ipratropium for Nebulization 3 milliLiter(s) Nebulizer every 6 hours    donepezil 5 milliGRAM(s) Oral at bedtime  morphine  - Injectable 2 milliGRAM(s) IV Push every 4 hours PRN  primidone 50 milliGRAM(s) Oral two times a day  topiramate 25 milliGRAM(s) Oral two times a day        senna 2 Tablet(s) Oral at bedtime        lactated ringers. 1000 milliLiter(s) IV Continuous <Continuous>      latanoprost 0.005% Ophthalmic Solution 1 Drop(s) Both EYES at bedtime  timolol 0.5% Solution 1 Drop(s) Both EYES daily    lactobacillus acidophilus 1 Tablet(s) Oral daily          ICU Vital Signs Last 24 Hrs  T(C): 36.7 (23 Aug 2021 07:23), Max: 37.5 (22 Aug 2021 20:11)  T(F): 98 (23 Aug 2021 07:23), Max: 99.5 (22 Aug 2021 20:11)  HR: 56 (23 Aug 2021 07:23) (44 - 103)  BP: 121/59 (23 Aug 2021 07:23) (121/59 - 169/73)  BP(mean): --  ABP: --  ABP(mean): --  RR: 18 (23 Aug 2021 07:23) (17 - 18)  SpO2: 100% (23 Aug 2021 07:23) (91% - 100%)    Vital Signs Last 24 Hrs  T(C): 36.7 (23 Aug 2021 07:23), Max: 37.5 (22 Aug 2021 20:11)  T(F): 98 (23 Aug 2021 07:23), Max: 99.5 (22 Aug 2021 20:11)  HR: 56 (23 Aug 2021 07:23) (44 - 103)  BP: 121/59 (23 Aug 2021 07:23) (121/59 - 169/73)  BP(mean): --  RR: 18 (23 Aug 2021 07:23) (17 - 18)  SpO2: 100% (23 Aug 2021 07:23) (91% - 100%)        I&O's Detail        LABS:                        13.4   8.06  )-----------( 176      ( 23 Aug 2021 06:55 )             40.6     08-23    141  |  106  |  23  ----------------------------<  96  4.1   |  26  |  0.68    Ca    8.2<L>      23 Aug 2021 06:55    TPro  6.6  /  Alb  2.7<L>  /  TBili  0.6  /  DBili  x   /  AST  16  /  ALT  23  /  AlkPhos  120  08-23      CARDIAC MARKERS ( 22 Aug 2021 20:15 )  x     / x     / 46 U/L / x     / x          CAPILLARY BLOOD GLUCOSE        PT/INR - ( 23 Aug 2021 08:41 )   PT: 12.4 sec;   INR: 1.06 ratio         PTT - ( 23 Aug 2021 08:41 )  PTT:30.9 sec  Urinalysis Basic - ( 22 Aug 2021 23:19 )    Color: Yellow / Appearance: Slightly Turbid / S.010 / pH: x  Gluc: x / Ketone: Small  / Bili: Negative / Urobili: Negative   Blood: x / Protein: 30 mg/dL / Nitrite: Positive   Leuk Esterase: Moderate / RBC: 3-5 /HPF / WBC 26-50   Sq Epi: x / Non Sq Epi: Occasional / Bacteria: TNTC      CULTURES:      Physical Examination:    General: No acute distress.  elderly male lying uncomfortably in bed    HEENT: Pupils equal, reactive to light.  Symmetric.    PULM: few rhonchi bilat good excursion no change percussion    CVS: Regular rate and rhythm, no murmurs, rubs, or gallops    ABD: Soft, nondistended, nontender, normoactive bowel sounds, no masses    EXT: No edema, tender left hip/ bandaged    SKIN: Warm and well perfused, no rashes noted.    NEURO: Alert, moves all ext, confused, interactive, nonfocal    RADIOLOGY: ***< from: CT Chest w/ IV Cont (21 @ 21:33) >  EXAM:  CT ABDOMEN AND PELVIS IC                          EXAM:  CT CHEST IC                            PROCEDURE DATE:  2021          INTERPRETATION:  CLINICAL INFORMATION: Abdominal trauma    COMPARISON: Comparison to numerous prior examinations most recent on 2021    CONTRAST/COMPLICATIONS:  IV Contrast: Omnipaque 350 (accession [id number]), IV contrast documented in associated exam (accession [id number])  90 cc administered (accession [id number]), 0 cc administered (accession [id number]) 10 cc discarded (accession [id number]), 0 cc discarded (accession [id number])  Oral Contrast: NONE  Complications: None reported at time of study completion    PROCEDURE:  CT of the Chest, Abdomen and Pelvis was performed.  Sagittal and coronal reformats were performed.    FINDINGS:  CHEST:  LUNGS AND LARGE AIRWAYS: Bronchial thickening the bronchus intermedius extending into the right lower lobe bronchus. Secretions identified at the lower trachea and left mainstem bronchus. Centrilobular emphysema. Mucoid impacted airways in the right lower lobe. Dependent subsegmental atelectasis in both lobes.  PLEURA: No pleural effusion.  VESSELS: Atherosclerotic changes of the aorta and coronary arteries. Aberrant left subclavian artery.  HEART: Heart size is normal. No pericardial effusion.  MEDIASTINUM AND EMMA: No lymphadenopathy.  CHEST WALL AND LOWER NECK: Within normal limits.    ABDOMEN AND PELVIS:  LIVER: Multiple simple hepatic cysts largest measuring 5.7 x 5.2 cm in size. Heterogeneous hypoattenuating lesion is again demonstrated at the inferior aspect of segment VI measuring 2.3 x 1.9 cm, stable from prior examination likely representing hemangioma.  BILE DUCTS: Normal caliber.  GALLBLADDER: Within normal limits.  SPLEEN: Within normal limits.  PANCREAS: Within normal limits.  ADRENALS: Within normal limits.  KIDNEYS/URETERS: Symmetrical enhancement without hydronephrosis. 6 mm nonobstructing calculus in the lower pole of the left kidney. 6 mm nonobstructing calculus in the midpole of the left kidney.    BLADDER: Diffuse wall thickening of the of the urinary bladder most prominent at the posterior wall, and numerous bladder diverticulum again demonstrated. Small calcification at the posterior left bladder wall again demonstrated.  REPRODUCTIVE ORGANS: Fiducial markers versus surgical anchors identified within the prostate.    BOWEL: No bowel obstruction. Appendix is not visualized. No evidence of inflammation in the pericecal region.There is diverticulosis without evidence of diverticulitis.  PERITONEUM: No ascites.  VESSELS: Atherosclerotic changes.  RETROPERITONEUM/LYMPH NODES: No lymphadenopathy.  ABDOMINAL WALL: Within normal limits.  BONES: Comminuted intertrochanteric fracture of the proximal left femur. Degenerative changes and severe levoscoliosis.    IMPRESSION:    Comminuted intertrochanteric fracture of the proximal left femur.    Mucoid impacted airways with chronic aspiration in the right lower lobe.    Other chronic ancillary findings as above without change from prior exams.        --- End of Report ---            KENY FARLEY   This document has been electronically signed. Aug 22 2021 10:22PM    < end of copied text >      CRITICAL CARE TIME SPENT: ***

## 2021-08-25 NOTE — DIETITIAN INITIAL EVALUATION ADULT. - PROBLEM SELECTOR PLAN 1
comminuted intertrochanteric fracture of the proximal left femur  -s/p suspected mechanical fall although pt is a poor historian and is bradycardic  -CT abd and pelvis: Comminuted intertrochanteric fracture of the proximal left femur  -bradycardia likely worsened by propanolol for essential tremors, hold home propanolol   -monitor on remote telemetry  -NPO for OR tomorrow   -fall precautions   -Dr. Henriquez consulted, plan for OR tomorrow  -Cardio consult Dr. Mercado's group for cardiac optimization, no absolute medical contraindications

## 2021-08-25 NOTE — DIETITIAN INITIAL EVALUATION ADULT. - PROBLEM SELECTOR PLAN 3
UA bacteria too numerous to count, 26-50 wbcs, +nitrite, moderate leuk esterase   -fall may have been a consequence of UTI  -start ceftriaxone q24  -f/u urine cx, blood cx

## 2021-08-26 LAB
ANION GAP SERPL CALC-SCNC: 10 MMOL/L — SIGNIFICANT CHANGE UP (ref 5–17)
BUN SERPL-MCNC: 35 MG/DL — HIGH (ref 7–23)
CALCIUM SERPL-MCNC: 8 MG/DL — LOW (ref 8.5–10.1)
CHLORIDE SERPL-SCNC: 103 MMOL/L — SIGNIFICANT CHANGE UP (ref 96–108)
CO2 SERPL-SCNC: 27 MMOL/L — SIGNIFICANT CHANGE UP (ref 22–31)
CREAT SERPL-MCNC: 1.7 MG/DL — HIGH (ref 0.5–1.3)
CULTURE RESULTS: SIGNIFICANT CHANGE UP
GLUCOSE SERPL-MCNC: 103 MG/DL — HIGH (ref 70–99)
HCT VFR BLD CALC: 30 % — LOW (ref 39–50)
HGB BLD-MCNC: 10.2 G/DL — LOW (ref 13–17)
MAGNESIUM SERPL-MCNC: 2.1 MG/DL — SIGNIFICANT CHANGE UP (ref 1.6–2.6)
MCHC RBC-ENTMCNC: 30.9 PG — SIGNIFICANT CHANGE UP (ref 27–34)
MCHC RBC-ENTMCNC: 34 GM/DL — SIGNIFICANT CHANGE UP (ref 32–36)
MCV RBC AUTO: 90.9 FL — SIGNIFICANT CHANGE UP (ref 80–100)
NRBC # BLD: 0 /100 WBCS — SIGNIFICANT CHANGE UP (ref 0–0)
PLATELET # BLD AUTO: 163 K/UL — SIGNIFICANT CHANGE UP (ref 150–400)
POTASSIUM SERPL-MCNC: 3.3 MMOL/L — LOW (ref 3.5–5.3)
POTASSIUM SERPL-SCNC: 3.3 MMOL/L — LOW (ref 3.5–5.3)
PROCALCITONIN SERPL-MCNC: 0.12 NG/ML — HIGH (ref 0–0.04)
RBC # BLD: 3.3 M/UL — LOW (ref 4.2–5.8)
RBC # FLD: 13.2 % — SIGNIFICANT CHANGE UP (ref 10.3–14.5)
SODIUM SERPL-SCNC: 140 MMOL/L — SIGNIFICANT CHANGE UP (ref 135–145)
SPECIMEN SOURCE: SIGNIFICANT CHANGE UP
WBC # BLD: 8.64 K/UL — SIGNIFICANT CHANGE UP (ref 3.8–10.5)
WBC # FLD AUTO: 8.64 K/UL — SIGNIFICANT CHANGE UP (ref 3.8–10.5)

## 2021-08-26 PROCEDURE — 99233 SBSQ HOSP IP/OBS HIGH 50: CPT

## 2021-08-26 RX ORDER — POTASSIUM CHLORIDE 20 MEQ
20 PACKET (EA) ORAL
Refills: 0 | Status: COMPLETED | OUTPATIENT
Start: 2021-08-26 | End: 2021-08-26

## 2021-08-26 RX ORDER — ALBUTEROL 90 UG/1
1 AEROSOL, METERED ORAL EVERY 4 HOURS
Refills: 0 | Status: DISCONTINUED | OUTPATIENT
Start: 2021-08-26 | End: 2021-08-27

## 2021-08-26 RX ADMIN — LATANOPROST 1 DROP(S): 0.05 SOLUTION/ DROPS OPHTHALMIC; TOPICAL at 22:11

## 2021-08-26 RX ADMIN — BUDESONIDE AND FORMOTEROL FUMARATE DIHYDRATE 2 PUFF(S): 160; 4.5 AEROSOL RESPIRATORY (INHALATION) at 17:37

## 2021-08-26 RX ADMIN — PRIMIDONE 50 MILLIGRAM(S): 250 TABLET ORAL at 05:55

## 2021-08-26 RX ADMIN — Medication 1200 MILLIGRAM(S): at 19:26

## 2021-08-26 RX ADMIN — Medication 50 MILLIGRAM(S): at 19:26

## 2021-08-26 RX ADMIN — BUDESONIDE AND FORMOTEROL FUMARATE DIHYDRATE 2 PUFF(S): 160; 4.5 AEROSOL RESPIRATORY (INHALATION) at 05:57

## 2021-08-26 RX ADMIN — PANTOPRAZOLE SODIUM 40 MILLIGRAM(S): 20 TABLET, DELAYED RELEASE ORAL at 05:55

## 2021-08-26 RX ADMIN — Medication 500 MILLIGRAM(S): at 05:55

## 2021-08-26 RX ADMIN — Medication 20 MILLIEQUIVALENT(S): at 14:24

## 2021-08-26 RX ADMIN — RIVAROXABAN 10 MILLIGRAM(S): KIT at 14:26

## 2021-08-26 RX ADMIN — Medication 1 TABLET(S): at 14:24

## 2021-08-26 RX ADMIN — TAMSULOSIN HYDROCHLORIDE 0.4 MILLIGRAM(S): 0.4 CAPSULE ORAL at 22:11

## 2021-08-26 RX ADMIN — Medication 25 MILLIGRAM(S): at 17:20

## 2021-08-26 RX ADMIN — Medication 1 MILLIGRAM(S): at 14:25

## 2021-08-26 RX ADMIN — Medication 20 MILLIEQUIVALENT(S): at 17:15

## 2021-08-26 RX ADMIN — Medication 20 MILLIEQUIVALENT(S): at 10:56

## 2021-08-26 RX ADMIN — Medication 1 DROP(S): at 14:26

## 2021-08-26 RX ADMIN — Medication 25 MILLIGRAM(S): at 05:55

## 2021-08-26 RX ADMIN — Medication 500 MILLIGRAM(S): at 17:19

## 2021-08-26 RX ADMIN — SENNA PLUS 2 TABLET(S): 8.6 TABLET ORAL at 22:11

## 2021-08-26 RX ADMIN — PRIMIDONE 50 MILLIGRAM(S): 250 TABLET ORAL at 17:19

## 2021-08-26 RX ADMIN — TIOTROPIUM BROMIDE 1 CAPSULE(S): 18 CAPSULE ORAL; RESPIRATORY (INHALATION) at 05:55

## 2021-08-26 NOTE — PROGRESS NOTE ADULT - PROBLEM SELECTOR PLAN 1
86y Male stable POD#0 s/p Intramedullary nailing of L IT fracture. Patient doing well post-operatively, without complaints at present. Vitals stable at present.  - F/up AM labs  - IV Ancef x 2 doses post-op  - Incentive spirometer  - DVT ppx: SCDs. Xarelto to be started POD#1  - Ambulation as tolerated  - PT, OT
comminuted intertrochanteric fracture of the proximal left femur  -s/p suspected mechanical fall although pt is a poor historian and is bradycardic  -CT abd and pelvis: Comminuted intertrochanteric fracture of the proximal left femur  -bradycardia likely worsened by propanolol for essential tremors, hold home propanolol   -monitor on remote telemetry  -fall precautions   -Dr. Henriquez consulted, S/P IMN  -Cardio consult Dr. Mercado's group for cardiac optimization, no absolute medical contraindications
comminuted intertrochanteric fracture of the proximal left femur  -s/p suspected mechanical fall although pt is a poor historian and is bradycardic  -CT abd and pelvis: Comminuted intertrochanteric fracture of the proximal left femur  -bradycardia likely worsened by propanolol for essential tremors, hold home propanolol   -monitor on remote telemetry  -NPO for OR today  -fall precautions   -Dr. Henriquez consulted, plan for OR today  -Cardio consult Dr. Mercado's group for cardiac optimization, no absolute medical contraindications

## 2021-08-26 NOTE — PROVIDER CONTACT NOTE (OTHER) - ACTION/TREATMENT ORDERED:
Stat duoneb given as ordered  Pt placed in hi flowers position  given ordered symbicort   Pt endorses improvement in breathing, will continue to monitor and notify MD of any changes

## 2021-08-26 NOTE — PROGRESS NOTE ADULT - ASSESSMENT
Pt. with left hip fx stable postop orif.   Needs extensive rehab.   COPD with some mucus plugging. Continue inhalers, hhn.  Placement.    Has UTI.   Dementia  Continue chest percussion, inhalers.  DVT prophylaxis.

## 2021-08-26 NOTE — PROGRESS NOTE ADULT - SUBJECTIVE AND OBJECTIVE BOX
No acute events reported overnight. Pt seen and examined at bedside this AM. Pt is confused however is not complaining of pain. Denies CP/SOB/fever/chills/n/v/numbness/tingling LLE. Nurse reports that patient has been stooling appropriately.     Vital Signs Last 24 Hrs  T(C): 37.1 (26 Aug 2021 05:24), Max: 37.1 (25 Aug 2021 09:58)  T(F): 98.8 (26 Aug 2021 05:24), Max: 98.8 (25 Aug 2021 09:58)  HR: 62 (26 Aug 2021 05:24) (60 - 77)  BP: 124/57 (26 Aug 2021 05:24) (114/72 - 124/57)  BP(mean): --  RR: 19 (26 Aug 2021 05:24) (19 - 19)  SpO2: 92% (26 Aug 2021 05:24) (90% - 94%)    08-25    138  |  103  |  29<H>  ----------------------------<  105<H>  3.6   |  25  |  1.40<H>    Ca    7.9<L>      25 Aug 2021 07:12  Phos  2.6     08-24  Mg     1.8     08-24                          11.1   10.67 )-----------( 158      ( 25 Aug 2021 07:12 )             32.3           PE:   GEN: NAD, resting in bed  LLE: 2x aquacel's in place over surgical incisions, c/d/i  L2-S1 SILT  motor grossly intact, +TA/EHL/FHL/GSC  + DP pulses  compartments soft and compressible b/l  calves nontender b/l No acute events reported overnight. Pt seen and examined at bedside this AM. Pt is confused however is not complaining of pain. Denies CP/SOB/fever/chills/n/v/numbness/tingling LLE. Nurse reports that patient has been stooling appropriately.     Vital Signs Last 24 Hrs  T(C): 37.1 (26 Aug 2021 05:24), Max: 37.1 (25 Aug 2021 09:58)  T(F): 98.8 (26 Aug 2021 05:24), Max: 98.8 (25 Aug 2021 09:58)  HR: 62 (26 Aug 2021 05:24) (60 - 77)  BP: 124/57 (26 Aug 2021 05:24) (114/72 - 124/57)  BP(mean): --  RR: 19 (26 Aug 2021 05:24) (19 - 19)  SpO2: 92% (26 Aug 2021 05:24) (90% - 94%)    08-25    138  |  103  |  29<H>  ----------------------------<  105<H>  3.6   |  25  |  1.40<H>    Ca    7.9<L>      25 Aug 2021 07:12  Phos  2.6     08-24  Mg     1.8     08-24                          11.1   10.67 )-----------( 158      ( 25 Aug 2021 07:12 )             32.3       PE:   GEN: NAD, resting in bed  LLE: 2x aquacel's in place over surgical incisions, c/d/i  L2-S1 SILT, +TA/EHL/FHL/GSC  + DP pulses  compartments soft and compressible b/l  calves nontender b/l

## 2021-08-26 NOTE — PROGRESS NOTE ADULT - ASSESSMENT
85 yo M s/p MF w/ Left IT fx with subtrochanteric extension POD3    -Medical management appreciated  -DVT ppx w/ Xarelto & SCDs  -WBAT  -PT/OT  -FU AM Labs  -Pain control PRN  -ICE, rest, elevation PRN  -Incentive spirometry  -Dispo: PT rec Rehab  -Will discuss with attending Dr. Henriquez and advise if any changes to plan 87 yo M s/p MF w/ Left IT fx with subtrochanteric extension POD3    -Medical management appreciated  -DVT ppx w/ Xarelto & SCDs  -WBAT  -PT/OT  -FU AM Labs  -Pain control/Ice PRN  -Incentive spirometry  -Dispo: PT rec Rehab once medically stable  -No further acute orthopedic surgical intervention indicated at this time. Orthopedically stable for discharge. Patient to follow up with Dr. Henriquez as outpatient for further evaluation and treatment.   -Will discuss with attending Dr. Henriquez and advise if any changes to plan

## 2021-08-26 NOTE — PROGRESS NOTE ADULT - PROBLEM SELECTOR PLAN 3
UA bacteria too numerous to count, 26-50 wbcs, +nitrite, moderate leuk esterase   -fall may have been a consequence of UTI  - S/P IV ABX  -f/u urine cx, blood cx  ID eval with dr. araiza
UA bacteria too numerous to count, 26-50 wbcs, +nitrite, moderate leuk esterase   -fall may have been a consequence of UTI  - IV ABX  -f/u urine cx, blood cx  ID eval
UA bacteria too numerous to count, 26-50 wbcs, +nitrite, moderate leuk esterase   -fall may have been a consequence of UTI  - IV ABX  -f/u urine cx, blood cx  ID eval
UA bacteria too numerous to count, 26-50 wbcs, +nitrite, moderate leuk esterase   -fall may have been a consequence of UTI  -start ceftriaxone q24  -f/u urine cx, blood cx

## 2021-08-26 NOTE — PROGRESS NOTE ADULT - SUBJECTIVE AND OBJECTIVE BOX
[facility], Division of Infectious Diseases  [address], [place] [place]  [phone]  ([phone] - weekdays after 5pm and weekends)    Name: MELO PIKE  Age/Gender: 86y Male  MRN: [id number]    Interval History:  Patient seen this morning, feels fine, has no new complaints.   Notes reviewed. Afebrile.     Allergies: azithromycin (Rash)    Objective:  Vitals:   T(F): 98 (08-26-21 @ 10:08), Max: 98.8 (08-26-21 @ 05:24)  HR: 54 (08-26-21 @ 11:30) (54 - 77)  BP: 120/70 (08-26-21 @ 11:30) (114/72 - 124/57)  RR: 17 (08-26-21 @ 10:08) (17 - 19)  SpO2: 89% (08-26-21 @ 11:30) (89% - 94%)  Physical Examination:  General: no acute distress, NC  HEENT: NC/AT, EOMI, anicteric, neck supple  Cardio: S1, S2 present, normal rate, no murmur  Resp: decreased breath sounds bilaterally  Abd: soft, NT, ND, + BS  Neuro: awake, alert, no obvious focal deficits  Ext: no edema or cyanosis, moving extremities  Skin: warm, dry, no visible rash  Lines: PIV    Laboratory Studies:  CBC:                       10.2   8.64  )-----------( 163      ( 26 Aug 2021 07:59 )             30.0     WBC Trend:  8.64 08-26-21 @ 07:59  10.67 08-25-21 @ 07:12  11.33 08-24-21 @ 12:06  11.15 08-23-21 @ 19:23  8.06 08-23-21 @ 06:55  6.75 08-22-21 @ 20:15    CMP: 08-26    140  |  103  |  35<H>  ----------------------------<  103<H>  3.3<L>   |  27  |  1.70<H>    Ca    8.0<L>      26 Aug 2021 07:59  Mg     2.1     08-26    Microbiology: reviewed   Culture - Blood (collected 08-23-21 @ 06:13)  Source: .Blood Blood-Peripheral  Preliminary Report (08-24-21 @ 07:01):    No growth to date.    Culture - Blood (collected 08-23-21 @ 06:13)  Source: .Blood Blood-Peripheral  Preliminary Report (08-24-21 @ 07:01):    No growth to date.    Culture - Urine (collected 08-23-21 @ 05:40)  Source: Clean Catch Clean Catch (Midstream)  Preliminary Report (08-24-21 @ 15:31):    >100,000 CFU/ml Coag Negative Staphylococcus "Susceptibilities not    performed"    Radiology: reviewed     Medications:  acetaminophen   Tablet .. 650 milliGRAM(s) Oral every 6 hours PRN  ascorbic acid 500 milliGRAM(s) Oral two times a day  budesonide 160 MICROgram(s)/formoterol 4.5 MICROgram(s) Inhaler 2 Puff(s) Inhalation two times a day  folic acid 1 milliGRAM(s) Oral daily  lactobacillus acidophilus 1 Tablet(s) Oral daily  latanoprost 0.005% Ophthalmic Solution 1 Drop(s) Both EYES at bedtime  magnesium hydroxide Suspension 30 milliLiter(s) Oral daily PRN  multivitamin 1 Tablet(s) Oral daily  ondansetron Injectable 4 milliGRAM(s) IV Push every 6 hours PRN  oxyCODONE    IR 10 milliGRAM(s) Oral every 4 hours PRN  oxyCODONE    IR 5 milliGRAM(s) Oral every 4 hours PRN  pantoprazole    Tablet 40 milliGRAM(s) Oral before breakfast  potassium chloride    Tablet ER 20 milliEquivalent(s) Oral every 2 hours  primidone 50 milliGRAM(s) Oral two times a day  propranolol 80 milliGRAM(s) Oral two times a day  rivaroxaban 10 milliGRAM(s) Oral daily  senna 2 Tablet(s) Oral at bedtime  tamsulosin 0.4 milliGRAM(s) Oral at bedtime  timolol 0.5% Solution 1 Drop(s) Both EYES daily  tiotropium 18 MICROgram(s) Capsule 1 Capsule(s) Inhalation daily  topiramate 25 milliGRAM(s) Oral two times a day    Antimicrobials:  s/p pip-tazo 8/24-8/25  s/p ceftriaxone 8/22-8/23  s/p cefazolin 8/23

## 2021-08-26 NOTE — PROGRESS NOTE ADULT - PROBLEM SELECTOR PLAN 5
-hx of severe essential tremor  -continue home topamax, Primidone,   -hold home propanolol given sinus shanti and syncope with possible cardiac etiology  neuro eval

## 2021-08-26 NOTE — PROGRESS NOTE ADULT - SUBJECTIVE AND OBJECTIVE BOX
PULMONARY/CRITICAL CARE    DOS 21  Overnite sob. Intermittently confused.  Less sob now.   Patient is a 86y old  Male who presents with a chief complaint of left hip fracture (23 Aug 2021 08:25)    BRIEF HOSPITAL COURSE: ***86 M PMHX dementia, essential tremor, COPD not on home O2, severe scoliosis, HLD, hx of partial colon resection, here for mechanical fall at 18:30. Pt was walking from the bathroom to the kitchen and tripped. Occurred on carpeted floor, using his cane, landed on left side. No head trauma, no LOC. Pt denies palpitations, CP, SOB, dizziness, lightheadedness prior to fall. As per wife, pt has been his usual self. Pt has slow heart rate at rest, on propanolol for essential tremor. Patient is a poor historian and history is limited.    In the ED,  VS: Tmax 99.5 , bp 159/69, RR 18, 91% on RA. Labs significant for alk p 132.   CT C/A/P: Comminuted intertrochanteric fracture of the proximal left femur. Mucoid impacted airways with chronic aspiration in the right lower lobe. Chronic stable similar to prior imaging: simple hepatic cysts largest measuring 5.7 x 5.2 cm in size. Liver hemangioma 2.3 x 1.9 cm. 6 mm non-obstructing calculus in the lower pole of the left kidney and midpole of the left kidney. Diffuse bladder wall thickening of the of the urinary bladder most prominent at the posterior wall, and numerous bladder diverticulum again demonstrated. Small calcification at the posterior left bladder wall again demonstrated.  EKG sinus shanti rate 48 bpm, no ischemic changes.  (22 Aug 2021 22:57)    Interval HPI: Patient is a 86 year old male with PMH of dementia, essential tremor, COPD (not on home O2), severe scoliosis, HLD, hx of partial colon resection. He was BIBA s/p suspected mechanical fall; pt states he was walking from the bathroom to the kitchen when he tripped. Pt landed on his left side injuring his L rib cage and L proximal femur; pt denies head trauma, LOC. Pt seen and evaluated at bedside at 8:15 today. HR at 57 bpm at time of exam. Pt rates pain of left hip at 8/10 and denies any pain of L ribs. EKG HR 48 bpm, no ischemic changes. Pt denies palpitations, CP, SOB, dizziness, lightheadedness. Pt denies any cardiac Hx and reports he has never seen a Cardiologist. Pt takes propranolol for essential tremor. Of note, pt presented to the [facility] ED in 2018 when marked sinus bradycardia of 43 bpm was noted on EKG.       Events last 24 hours: ***    PAST MEDICAL & SURGICAL HISTORY:  Scoliosis    Chronic cough    Osteoporosis    Occasional tremors  severe essential tremors    Memory loss  dementia    COPD, severe    HLD (hyperlipidemia)    H/O partial resection of colon      Allergies    azithromycin (Rash)    Intolerances      FAMILY HISTORY: distant smoker, no etoh  FHx: dementia (Mother)    FH: colon cancer (Father)    FH: Parkinson&#x27;s disease (Sibling)    FH: stroke (Sibling)          Medications:  cefTRIAXone   IVPB 1000 milliGRAM(s) IV Intermittent every 24 hours  cefTRIAXone   IVPB        tamsulosin 0.4 milliGRAM(s) Oral at bedtime    albuterol/ipratropium for Nebulization 3 milliLiter(s) Nebulizer every 6 hours    donepezil 5 milliGRAM(s) Oral at bedtime  morphine  - Injectable 2 milliGRAM(s) IV Push every 4 hours PRN  primidone 50 milliGRAM(s) Oral two times a day  topiramate 25 milliGRAM(s) Oral two times a day        senna 2 Tablet(s) Oral at bedtime        lactated ringers. 1000 milliLiter(s) IV Continuous <Continuous>      latanoprost 0.005% Ophthalmic Solution 1 Drop(s) Both EYES at bedtime  timolol 0.5% Solution 1 Drop(s) Both EYES daily    lactobacillus acidophilus 1 Tablet(s) Oral daily          ICU Vital Signs Last 24 Hrs  T(C): 36.7 (23 Aug 2021 07:23), Max: 37.5 (22 Aug 2021 20:11)  T(F): 98 (23 Aug 2021 07:23), Max: 99.5 (22 Aug 2021 20:11)  HR: 56 (23 Aug 2021 07:23) (44 - 103)  BP: 121/59 (23 Aug 2021 07:23) (121/59 - 169/73)  BP(mean): --  ABP: --  ABP(mean): --  RR: 18 (23 Aug 2021 07:23) (17 - 18)  SpO2: 100% (23 Aug 2021 07:23) (91% - 100%)    Vital Signs Last 24 Hrs  T(C): 36.7 (23 Aug 2021 07:23), Max: 37.5 (22 Aug 2021 20:11)  T(F): 98 (23 Aug 2021 07:23), Max: 99.5 (22 Aug 2021 20:11)  HR: 56 (23 Aug 2021 07:23) (44 - 103)  BP: 121/59 (23 Aug 2021 07:23) (121/59 - 169/73)  BP(mean): --  RR: 18 (23 Aug 2021 07:23) (17 - 18)  SpO2: 100% (23 Aug 2021 07:23) (91% - 100%)        I&O's Detail        LABS:                        13.4   8.06  )-----------( 176      ( 23 Aug 2021 06:55 )             40.6     08-23    141  |  106  |  23  ----------------------------<  96  4.1   |  26  |  0.68    Ca    8.2<L>      23 Aug 2021 06:55    TPro  6.6  /  Alb  2.7<L>  /  TBili  0.6  /  DBili  x   /  AST  16  /  ALT  23  /  AlkPhos  120  08-23      CARDIAC MARKERS ( 22 Aug 2021 20:15 )  x     / x     / 46 U/L / x     / x          CAPILLARY BLOOD GLUCOSE        PT/INR - ( 23 Aug 2021 08:41 )   PT: 12.4 sec;   INR: 1.06 ratio         PTT - ( 23 Aug 2021 08:41 )  PTT:30.9 sec  Urinalysis Basic - ( 22 Aug 2021 23:19 )    Color: Yellow / Appearance: Slightly Turbid / S.010 / pH: x  Gluc: x / Ketone: Small  / Bili: Negative / Urobili: Negative   Blood: x / Protein: 30 mg/dL / Nitrite: Positive   Leuk Esterase: Moderate / RBC: 3-5 /HPF / WBC 26-50   Sq Epi: x / Non Sq Epi: Occasional / Bacteria: TNTC      CULTURES:      Physical Examination:    General: No acute distress.  elderly male lying uncomfortably in bed    HEENT: Pupils equal, reactive to light.  Symmetric.    PULM: few rhonchi bilat good excursion no change percussion    CVS: Regular rate and rhythm, no murmurs, rubs, or gallops    ABD: Soft, nondistended, nontender, normoactive bowel sounds, no masses    EXT: No edema, tender left hip/ bandaged    SKIN: Warm and well perfused, no rashes noted.    NEURO: Alert, moves all ext, confused, interactive, nonfocal    RADIOLOGY: ***< from: CT Chest w/ IV Cont (21 @ 21:33) >  EXAM:  CT ABDOMEN AND PELVIS IC                          EXAM:  CT CHEST IC                            PROCEDURE DATE:  2021          INTERPRETATION:  CLINICAL INFORMATION: Abdominal trauma    COMPARISON: Comparison to numerous prior examinations most recent on 2021    CONTRAST/COMPLICATIONS:  IV Contrast: Omnipaque 350 (accession [id number]), IV contrast documented in associated exam (accession [id number])  90 cc administered (accession [id number]), 0 cc administered (accession [id number]) 10 cc discarded (accession [id number]), 0 cc discarded (accession [id number])  Oral Contrast: NONE  Complications: None reported at time of study completion    PROCEDURE:  CT of the Chest, Abdomen and Pelvis was performed.  Sagittal and coronal reformats were performed.    FINDINGS:  CHEST:  LUNGS AND LARGE AIRWAYS: Bronchial thickening the bronchus intermedius extending into the right lower lobe bronchus. Secretions identified at the lower trachea and left mainstem bronchus. Centrilobular emphysema. Mucoid impacted airways in the right lower lobe. Dependent subsegmental atelectasis in both lobes.  PLEURA: No pleural effusion.  VESSELS: Atherosclerotic changes of the aorta and coronary arteries. Aberrant left subclavian artery.  HEART: Heart size is normal. No pericardial effusion.  MEDIASTINUM AND EMMA: No lymphadenopathy.  CHEST WALL AND LOWER NECK: Within normal limits.    ABDOMEN AND PELVIS:  LIVER: Multiple simple hepatic cysts largest measuring 5.7 x 5.2 cm in size. Heterogeneous hypoattenuating lesion is again demonstrated at the inferior aspect of segment VI measuring 2.3 x 1.9 cm, stable from prior examination likely representing hemangioma.  BILE DUCTS: Normal caliber.  GALLBLADDER: Within normal limits.  SPLEEN: Within normal limits.  PANCREAS: Within normal limits.  ADRENALS: Within normal limits.  KIDNEYS/URETERS: Symmetrical enhancement without hydronephrosis. 6 mm nonobstructing calculus in the lower pole of the left kidney. 6 mm nonobstructing calculus in the midpole of the left kidney.    BLADDER: Diffuse wall thickening of the of the urinary bladder most prominent at the posterior wall, and numerous bladder diverticulum again demonstrated. Small calcification at the posterior left bladder wall again demonstrated.  REPRODUCTIVE ORGANS: Fiducial markers versus surgical anchors identified within the prostate.    BOWEL: No bowel obstruction. Appendix is not visualized. No evidence of inflammation in the pericecal region.There is diverticulosis without evidence of diverticulitis.  PERITONEUM: No ascites.  VESSELS: Atherosclerotic changes.  RETROPERITONEUM/LYMPH NODES: No lymphadenopathy.  ABDOMINAL WALL: Within normal limits.  BONES: Comminuted intertrochanteric fracture of the proximal left femur. Degenerative changes and severe levoscoliosis.    IMPRESSION:    Comminuted intertrochanteric fracture of the proximal left femur.    Mucoid impacted airways with chronic aspiration in the right lower lobe.    Other chronic ancillary findings as above without change from prior exams.        --- End of Report ---            KENY FARLEY   This document has been electronically signed. Aug 22 2021 10:22PM    < end of copied text >      CRITICAL CARE TIME SPENT: ***

## 2021-08-26 NOTE — PROGRESS NOTE ADULT - PROBLEM SELECTOR PLAN 6
-continue home pravastatin

## 2021-08-26 NOTE — PROGRESS NOTE ADULT - SUBJECTIVE AND OBJECTIVE BOX
Patient is a 86y old  Male who presents with a chief complaint of left hip fracture (26 Aug 2021 12:48)      INTERVAL /OVERNIGHT EVENTS: still hypoxic    MEDICATIONS  (STANDING):  ALBUTerol    90 MICROgram(s) HFA Inhaler 1 Puff(s) Inhalation every 4 hours  ascorbic acid 500 milliGRAM(s) Oral two times a day  budesonide 160 MICROgram(s)/formoterol 4.5 MICROgram(s) Inhaler 2 Puff(s) Inhalation two times a day  folic acid 1 milliGRAM(s) Oral daily  lactobacillus acidophilus 1 Tablet(s) Oral daily  latanoprost 0.005% Ophthalmic Solution 1 Drop(s) Both EYES at bedtime  multivitamin 1 Tablet(s) Oral daily  pantoprazole    Tablet 40 milliGRAM(s) Oral before breakfast  predniSONE   Tablet 50 milliGRAM(s) Oral every 24 hours  primidone 50 milliGRAM(s) Oral two times a day  propranolol 80 milliGRAM(s) Oral two times a day  rivaroxaban 10 milliGRAM(s) Oral daily  senna 2 Tablet(s) Oral at bedtime  tamsulosin 0.4 milliGRAM(s) Oral at bedtime  timolol 0.5% Solution 1 Drop(s) Both EYES daily  tiotropium 18 MICROgram(s) Capsule 1 Capsule(s) Inhalation daily  topiramate 25 milliGRAM(s) Oral two times a day    MEDICATIONS  (PRN):  acetaminophen   Tablet .. 650 milliGRAM(s) Oral every 6 hours PRN Temp greater or equal to 38C (100.4F), Mild Pain (1 - 3)  magnesium hydroxide Suspension 30 milliLiter(s) Oral daily PRN Constipation  ondansetron Injectable 4 milliGRAM(s) IV Push every 6 hours PRN Nausea and/or Vomiting  oxyCODONE    IR 10 milliGRAM(s) Oral every 4 hours PRN Severe Pain (7 - 10)  oxyCODONE    IR 5 milliGRAM(s) Oral every 4 hours PRN Moderate Pain (4 - 6)      Allergies    azithromycin (Rash)    Intolerances    lactose (Diarrhea)      REVIEW OF SYSTEMS:  CONSTITUTIONAL: No fever, weight loss, or fatigue  EYES: No eye pain, visual disturbances, or discharge  ENMT:  No difficulty hearing, tinnitus, vertigo; No sinus or throat pain  NECK: No pain or stiffness  RESPIRATORY: No cough, wheezing, chills or hemoptysis; No shortness of breath  CARDIOVASCULAR: No chest pain, palpitations, dizziness, or leg swelling  GASTROINTESTINAL: No abdominal or epigastric pain. No nausea, vomiting, or hematemesis; No diarrhea or constipation. No melena or hematochezia.  GENITOURINARY: No dysuria, frequency, hematuria, or incontinence  NEUROLOGICAL: No headaches, memory loss, loss of strength, numbness, or tremors  SKIN: No itching, burning, rashes, or lesions   LYMPH NODES: No enlarged glands  ENDOCRINE: No heat or cold intolerance; No hair loss; No polydipsia or polyuria  MUSCULOSKELETAL: No joint pain or swelling; No muscle, back, or extremity pain  PSYCHIATRIC: No depression, anxiety, mood swings, or difficulty sleeping  HEME/LYMPH: No easy bruising, or bleeding gums  ALLERGY AND IMMUNOLOGIC: No hives or eczema    Vital Signs Last 24 Hrs  T(C): 36.6 (26 Aug 2021 17:29), Max: 37.1 (26 Aug 2021 05:24)  T(F): 97.9 (26 Aug 2021 17:29), Max: 98.8 (26 Aug 2021 05:24)  HR: 69 (26 Aug 2021 17:29) (54 - 77)  BP: 149/77 (26 Aug 2021 17:29) (117/58 - 149/77)  BP(mean): --  RR: 18 (26 Aug 2021 17:29) (17 - 19)  SpO2: 90% (26 Aug 2021 17:29) (89% - 94%)    PHYSICAL EXAM:  GENERAL: NAD, well-groomed, well-developed  HEAD:  Atraumatic, Normocephalic  EYES: EOMI, PERRLA, conjunctiva and sclera clear  ENMT: No tonsillar erythema, exudates, or enlargement; Moist mucous membranes, Good dentition, No lesions  NECK: Supple, No JVD, Normal thyroid  NERVOUS SYSTEM:  Alert & Oriented X3, Good concentration; Motor Strength 5/5 B/L upper and lower extremities; DTRs 2+ intact and symmetric  CHEST/LUNG: Clear to auscultation bilaterally; No rales, rhonchi, wheezing, or rubs  HEART: Regular rate and rhythm; No murmurs, rubs, or gallops  ABDOMEN: Soft, Nontender, Nondistended; Bowel sounds present  EXTREMITIES:  2+ Peripheral Pulses, No clubbing, cyanosis, or edema  LYMPH: No lymphadenopathy noted  SKIN: No rashes or lesions    LABS:                        10.2   8.64  )-----------( 163      ( 26 Aug 2021 07:59 )             30.0     26 Aug 2021 07:59    140    |  103    |  35     ----------------------------<  103    3.3     |  27     |  1.70     Ca    8.0        26 Aug 2021 07:59  Mg     2.1       26 Aug 2021 13:09          CAPILLARY BLOOD GLUCOSE          RADIOLOGY & ADDITIONAL TESTS:    Notes Reviewed:  [x ] YES  [ ] NO    Care Discussed with Consultants/Other Providers [x ] YES  [ ] NO

## 2021-08-26 NOTE — PROGRESS NOTE ADULT - ASSESSMENT
Patient is a 86 year old male with PMH of dementia, essential tremor, COPD not on home O2, severe scoliosis, HLD, partial colon resection who presented after a mechanical fall and admitted for left femur fracture.     Ruled out UTI   - UA with WBC 26-50, mod LE, +nitrite, bacteria and occasional epithelial cells - suspect contaminated specimen    - urine culture with CoNS   - pt asymptomatic, denies any urinary complaints, afebrile   - s/p ceftriaxone 8/22 > pip-tazo now (total d5 of abx)   - discontinued pip-tazo    - continue off antibiotics     Proximal L comminuted intertrochanteric fracture s/p mechanical fall   - s/p subtrochanteric extension 8/23   - Orthopedics following    Leukocytosis likely reactive due to above    - WBC normalized, afebrile    - monitor temps/WBC    COPD with mucus plugging    - Pulmonary following, on chest percussion and inhalers     ID will sign off at this time but remains available for any further questions/concerns.    Lacy Yanez M.D.  [facility], Division of Infectious Diseases  [phone]  After 5pm on weekdays and all day on weekends - please call [phone]

## 2021-08-26 NOTE — PROGRESS NOTE ADULT - PROBLEM SELECTOR PLAN 9
BPH - continue home flomax   monitor for post-op retention
BPH - continue home flomax   monitor for post-op retention  watch for retention

## 2021-08-26 NOTE — PROGRESS NOTE ADULT - ASSESSMENT
86 year old male with PMH of dementia, essential tremor, COPD (not on home O2), severe scoliosis, HLD, hx of partial colon resection. He was BIBA s/p suspected mechanical fall and was found to have left proximal femur fracture, is being evaluated for Sinus shanti and surgical clearance    SVT  - Admission EKG shows sinus Shanti with HR of 48 with no ischemic changes.  - Episodes of SVT x 2 in setting of missed bb spontaneously broke.  No further noted.  Can discontinue telemetry  - Continue home propanolol 80mg Po BID   - ECHO: a difficult study, unable to visualize any structure    s/p Mechanical Fall with left femoral Fx  - s/p intramedullary nailing of femur  - Pain control  - Care per Ortho  - Initiate incentive spirometer if able    DVT ppx  - Per Primary    Monitor and replete electrolytes. Keep K>4.0 and Mg>2.0.    Akila Wilburn DNP, NP-C  Cardiology   Spectra #[phone]       86 year old male with PMH of dementia, essential tremor, COPD (not on home O2), severe scoliosis, HLD, hx of partial colon resection. He was BIBA s/p suspected mechanical fall and was found to have left proximal femur fracture, is being evaluated for Sinus shanti and surgical clearance    SVT  - Admission EKG shows sinus Shanti with HR of 48 with no ischemic changes.  - Episodes of SVT x 2 in setting of missed bb spontaneously broke.  No further noted.  Can discontinue telemetry  - Continue home propanolol 80mg Po BID   - ECHO: a difficult study, unable to visualize any structure    s/p Mechanical Fall with left femoral Fx  - s/p intramedullary nailing of femur  - Pain control  - Care per Ortho  - Initiate incentive spirometer if able    SARIKA  - Worsening creatinine: 1.7 <--1.4 <-- .82  - Avoid nephrotoxics  - Okay with gentle hydration    DVT ppx  - Per Primary    Monitor and replete electrolytes. Keep K>4.0 and Mg>2.0.    Akila Wilburn DNP, NP-C  Cardiology   Spectra #[phone]       86 year old male with PMH of dementia, essential tremor, COPD (not on home O2), severe scoliosis, HLD, hx of partial colon resection. He was BIBA s/p suspected mechanical fall and was found to have left proximal femur fracture, is being evaluated for Sinus shanti and surgical clearance    SVT  - Admission EKG shows sinus Shanti with HR of 48 with no ischemic changes.  - Episodes of SVT x 2 in setting of missed bb spontaneously broke.  No further noted.  Can discontinue telemetry  - Continue home propanolol 80mg Po BID   - ECHO: a difficult study, unable to visualize any structure    s/p Mechanical Fall with left femoral Fx  - s/p intramedullary nailing of femur  - Pain control  - Care per Ortho  - Initiate incentive spirometer if able    -more hypoxic  -cont suppl o2  -at risk of decompensation    SARIKA  - Worsening creatinine: 1.7 <--1.4 <-- .82  - Avoid nephrotoxics  - Okay with gentle hydration    DVT ppx  - Per Primary    Monitor and replete electrolytes. Keep K>4.0 and Mg>2.0.    Akila Wilburn DNP, NP-C  Cardiology   Spectra #[phone]

## 2021-08-26 NOTE — PROGRESS NOTE ADULT - PROBLEM SELECTOR PLAN 2
-sinus shanti   -suspect sinus shanti in setting of propanolol use for essential tremors   -EKG rate 48 bpm, no ischemic changes  -check 2D ECHO  -consult cardio with dr. mickey albright
-sinus shanti   -suspect sinus shanti in setting of propanolol use for essential tremors   -EKG rate 48 bpm, no ischemic changes  -check 2D ECHO  -consult cardio with dr. arevalo
-sinus shanti   -suspect sinus shanti in setting of propanolol use for essential tremors   -EKG rate 48 bpm, no ischemic changes  -check 2D ECHO  -consult cardio with dr. arevalo
-sinus shanti   -suspect sinus shanti in setting of propanolol use for essential tremors   -EKG rate 48 bpm, no ischemic changes  -check 2D ECHO  -consult cardio  hold beta blocker

## 2021-08-26 NOTE — PROGRESS NOTE ADULT - PROBLEM SELECTOR PLAN 7
-COPD not on home O2  -former smoker 50 years x 1-2 ppd  -CT chest mucoid impacted airways with chronic aspiration in the right lower lobe  -may benefit from pulm VENUS Bolton
-COPD not on home O2  -former smoker 50 years x 1-2 ppd  -CT chest mucoid impacted airways with chronic aspiration in the right lower lobe  -may benefit from pulm VENUS Bolton  inhalers per pulm
-COPD not on home O2  -former smoker 50 years x 1-2 ppd  -CT chest mucoid impacted airways with chronic aspiration in the right lower lobe  -may benefit from pulm VENUS Bolton  inhalers per pulm
-COPD not on home O2  -former smoker 50 years x 1-2 ppd  -CT chest mucoid impacted airways with chronic aspiration in the right lower lobe  -may benefit from pulm EVAL Dr. Bolton  inhalers per pulm  add steroids

## 2021-08-26 NOTE — PROGRESS NOTE ADULT - PROBLEM SELECTOR PLAN 10
VTE ppx: SCDs    Glaucoma - continue home timolol and Latanoprost eye drops
VTE ppx: SCDs, plan for OR w/ Dr. Henriquez    Glaucoma - continue home timolol and Latanoprost eye drops

## 2021-08-26 NOTE — PROVIDER CONTACT NOTE (OTHER) - ASSESSMENT
pt appears distressed, tachypneic, 02 saturation 83% on 4LNC pt placed on 6LNC now saturating 93-94%

## 2021-08-26 NOTE — PROGRESS NOTE ADULT - SUBJECTIVE AND OBJECTIVE BOX
[facility] -- Leonard Mercado, Deana Corbin, Beau Mathis Savella, [place]  Office # [phone]    Follow Up:  Preop/Postop Optimization, SVT    Subjective/Observations: Sitting on the chair, NC in use, not in any form or discomfort.  Confused and disoriented.  Unable to obtain ROS    REVIEW OF SYSTEMS: All other review of systems is negative unless indicated above  PAST MEDICAL & SURGICAL HISTORY:  Scoliosis    Chronic cough    Osteoporosis    Occasional tremors  severe essential tremors    Memory loss  dementia    COPD, severe    HLD (hyperlipidemia)    H/O partial resection of colon    MEDICATIONS  (STANDING):  ascorbic acid 500 milliGRAM(s) Oral two times a day  budesonide 160 MICROgram(s)/formoterol 4.5 MICROgram(s) Inhaler 2 Puff(s) Inhalation two times a day  folic acid 1 milliGRAM(s) Oral daily  lactobacillus acidophilus 1 Tablet(s) Oral daily  latanoprost 0.005% Ophthalmic Solution 1 Drop(s) Both EYES at bedtime  multivitamin 1 Tablet(s) Oral daily  pantoprazole    Tablet 40 milliGRAM(s) Oral before breakfast  potassium chloride    Tablet ER 20 milliEquivalent(s) Oral every 2 hours  primidone 50 milliGRAM(s) Oral two times a day  propranolol 80 milliGRAM(s) Oral two times a day  rivaroxaban 10 milliGRAM(s) Oral daily  senna 2 Tablet(s) Oral at bedtime  tamsulosin 0.4 milliGRAM(s) Oral at bedtime  timolol 0.5% Solution 1 Drop(s) Both EYES daily  tiotropium 18 MICROgram(s) Capsule 1 Capsule(s) Inhalation daily  topiramate 25 milliGRAM(s) Oral two times a day    MEDICATIONS  (PRN):  acetaminophen   Tablet .. 650 milliGRAM(s) Oral every 6 hours PRN Temp greater or equal to 38C (100.4F), Mild Pain (1 - 3)  magnesium hydroxide Suspension 30 milliLiter(s) Oral daily PRN Constipation  ondansetron Injectable 4 milliGRAM(s) IV Push every 6 hours PRN Nausea and/or Vomiting  oxyCODONE    IR 10 milliGRAM(s) Oral every 4 hours PRN Severe Pain (7 - 10)  oxyCODONE    IR 5 milliGRAM(s) Oral every 4 hours PRN Moderate Pain (4 - 6)    Allergies    azithromycin (Rash)    Intolerances    lactose (Diarrhea)    Vital Signs Last 24 Hrs  T(C): 37.1 (26 Aug 2021 05:24), Max: 37.1 (26 Aug 2021 05:24)  T(F): 98.8 (26 Aug 2021 05:24), Max: 98.8 (26 Aug 2021 05:24)  HR: 62 (26 Aug 2021 05:24) (60 - 77)  BP: 124/57 (26 Aug 2021 05:24) (114/72 - 124/57)  BP(mean): --  RR: 19 (26 Aug 2021 05:24) (19 - 19)  SpO2: 92% (26 Aug 2021 05:24) (92% - 94%)  I&O's Summary    25 Aug 2021 07:01  -  26 Aug 2021 07:00  --------------------------------------------------------  IN: 290 mL / OUT: 0 mL / NET: 290 mL      PHYSICAL EXAM:  TELE: SB  Constitutional: NAD, awake and alert, well-developed  HEENT: Moist Mucous Membranes, Anicteric  Pulmonary: Non-labored, breath sounds are equal bilaterally, No wheezing, rales.  +coarse rhonchi  Cardiovascular: Regular, S1 and S2, No murmurs, rubs, gallops or clicks  Gastrointestinal: Bowel Sounds present, soft, nontender.   Lymph: No peripheral edema. No lymphadenopathy.  Skin: No visible rashes or ulcers.  Left hip dressing dry and intact  Psych:  Mood & affect: Very confused  LABS: All Labs Reviewed:                        10.2   8.64  )-----------( 163      ( 26 Aug 2021 07:59 )             30.0                         11.1   10.67 )-----------( 158      ( 25 Aug 2021 07:12 )             32.3                         12.6   11.33 )-----------( 179      ( 24 Aug 2021 12:06 )             38.1     26 Aug 2021 07:59    140    |  103    |  35     ----------------------------<  103    3.3     |  27     |  1.70   25 Aug 2021 07:12    138    |  103    |  29     ----------------------------<  105    3.6     |  25     |  1.40   24 Aug 2021 07:42    139    |  103    |  19     ----------------------------<  120    3.8     |  28     |  0.82     Ca    8.0        26 Aug 2021 07:59  Ca    7.9        25 Aug 2021 07:12  Ca    8.2        24 Aug 2021 07:42  Phos  2.6       24 Aug 2021 12:06  Mg     1.8       24 Aug 2021 12:06       EXAM:  ECHO TTE WO CON COMP W DOPP         PROCEDURE DATE:  08/23/2021        INTERPRETATION:  INDICATION: Syncope  Sonographer AS    Blood Pressure 121/59    Height 177.8 cm     Weight 74.8 kg       BSA 1.9 sq m    Dimensions:  LA unavailable  Normal Values: 2.0 - 4.0 cm  Ao unavailable        Normal Values: 2.0 - 3.8 cm  SEPTUM unavailable       Normal Values: 0.6 - 1.2 cm  PWT unavailable       Normal Values: 0.6 - 1.1 cm  LVIDd unavailable         Normal Values: 3.0 - 5.6 cm  LVIDs unavailable         Normal Values: 1.8 - 4.0 cm    OBSERVATIONS:  Technically difficult and extremely limited study  Unable to accurately assess ventricular or valvular function  Mitral Valve: Not well-visualized  Aortic Valve/Aorta: Not well-visualized  Tricuspid Valve: Not well-visualized  Pulmonic Valve: Not well-visualized  Left Atrium: Not well-visualized  Right Atrium: Not well-visualized  Left Ventricle: Not well-visualized  Right Ventricle: Not well-visualized    IMPRESSION:  Technically difficult and extremely limited study  Unable to accurately assess ventricular or valvular function    --- End of Report ---    JORDYN EUGENE MD; Attending Cardiologist  This document has been electronically signed. Aug 24 2021 12:53PM      EXAM:  XR CHEST PORTABLE URGENT 1V                          PROCEDURE DATE:  08/24/2021      INTERPRETATION:  Chest portable.    Clinical History: Supraventricular tachycardia.    Comparison: 8/22/2021.    Single AP view submitted.  The patient is rotated.    The evaluation of the cardiomediastinal silhouette is limited on portable technique.  Mildly tortuous, calcified aorta.    Low lung volumes are present.  There is a probable small right-sided pleural effusion with underlying right infrahilar/lower lobe airspace consolidation.  There is patchy consolidation at the left lung base.    Impression:    Findings as discussed above.    --- End of Report ---  GISSELLE THOMAS MD; Attending Radiologist  This document has been electronically signed. Aug 24 2021 11:45AM    Ventricular Rate 111 BPM    Atrial Rate 111 BPM    P-R Interval 160 ms    QRS Duration 82 ms    Q-T Interval 322 ms    QTC Calculation(Bazett) 437 ms    P Axis 28 degrees    R Axis 6 degrees    T Axis -21 degrees    Diagnosis Line Sinus tachycardia  Inferior infarct , age undetermined  Anterior infarct , age undetermined  Abnormal ECG  Confirmed by joi Medrano ([id number]) on 8/24/2021 3:33:54 PM

## 2021-08-26 NOTE — PROGRESS NOTE ADULT - SUBJECTIVE AND OBJECTIVE BOX
Neurology follow up note    MELO [id number]      Interval History:    Patient feels little better today     Allergies    azithromycin (Rash)    Intolerances    lactose (Diarrhea)      MEDICATIONS    acetaminophen   Tablet .. 650 milliGRAM(s) Oral every 6 hours PRN  ascorbic acid 500 milliGRAM(s) Oral two times a day  budesonide 160 MICROgram(s)/formoterol 4.5 MICROgram(s) Inhaler 2 Puff(s) Inhalation two times a day  folic acid 1 milliGRAM(s) Oral daily  lactobacillus acidophilus 1 Tablet(s) Oral daily  latanoprost 0.005% Ophthalmic Solution 1 Drop(s) Both EYES at bedtime  magnesium hydroxide Suspension 30 milliLiter(s) Oral daily PRN  multivitamin 1 Tablet(s) Oral daily  ondansetron Injectable 4 milliGRAM(s) IV Push every 6 hours PRN  oxyCODONE    IR 10 milliGRAM(s) Oral every 4 hours PRN  oxyCODONE    IR 5 milliGRAM(s) Oral every 4 hours PRN  pantoprazole    Tablet 40 milliGRAM(s) Oral before breakfast  primidone 50 milliGRAM(s) Oral two times a day  propranolol 80 milliGRAM(s) Oral two times a day  rivaroxaban 10 milliGRAM(s) Oral daily  senna 2 Tablet(s) Oral at bedtime  tamsulosin 0.4 milliGRAM(s) Oral at bedtime  timolol 0.5% Solution 1 Drop(s) Both EYES daily  tiotropium 18 MICROgram(s) Capsule 1 Capsule(s) Inhalation daily  topiramate 25 milliGRAM(s) Oral two times a day              Vital Signs Last 24 Hrs  T(C): 37.1 (26 Aug 2021 05:24), Max: 37.1 (25 Aug 2021 09:58)  T(F): 98.8 (26 Aug 2021 05:24), Max: 98.8 (25 Aug 2021 09:58)  HR: 62 (26 Aug 2021 05:24) (60 - 77)  BP: 124/57 (26 Aug 2021 05:24) (114/72 - 124/57)  BP(mean): --  RR: 19 (26 Aug 2021 05:24) (19 - 19)  SpO2: 92% (26 Aug 2021 05:24) (90% - 94%)    REVIEW OF SYSTEMS:  Constitutional:  The patient denies fever, chills, or night sweats.  Head:  No headache.  Eyes:  No double vision or blurry vision.  Ears:  No ringing in his ears.  Neck:  No neck pain.  Respiratory:  No shortness of breath.  Cardiovascular:  No chest pain.  Abdomen:  No nausea, vomiting, or abdominal pain.  Extremities/Neurological:  No numbness or tingling.  Musculoskeletal:  Positive pain especially in the left hip status post surgical intervention.  Genitourinary:  No burning upon urination.    PHYSICAL EXAMINATION:   HEENT:  Head:  Normocephalic and atraumatic.  Eyes:  No scleral icterus.  Ears:  Hearing bilaterally intact.  NECK:  Supple.  RESPIRATORY:  Good air entry bilaterally.  CARDIOVASCULAR:  S1 and S2 heard.  ABDOMEN:  Soft and nontender.  EXTREMITIES:  No clubbing or cyanosis was noted.     NEUROLOGIC:  The patient is awake and alert.  Positive signs of forgetfulness upon conversing with the patient.  The patient has dementia.  Extraocular movements were intact.  Speech was fluent.  Smile was symmetric.  Motor:  Bilateral upper were 3+/5, right lower 3-/5, left lower had decreased range of motion of the hip status post surgical intervention, distally was 3/5.  Positive tremors were noted in bilateral hands resting and they appeared to be continued with movement.  Positive mouth tremor was noted.  No cogwheel rigidity was noted.                 LABS:  CBC Full  -  ( 26 Aug 2021 07:59 )  WBC Count : 8.64 K/uL  RBC Count : 3.30 M/uL  Hemoglobin : 10.2 g/dL  Hematocrit : 30.0 %  Platelet Count - Automated : 163 K/uL  Mean Cell Volume : 90.9 fl  Mean Cell Hemoglobin : 30.9 pg  Mean Cell Hemoglobin Concentration : 34.0 gm/dL  Auto Neutrophil # : x  Auto Lymphocyte # : x  Auto Monocyte # : x  Auto Eosinophil # : x  Auto Basophil # : x  Auto Neutrophil % : x  Auto Lymphocyte % : x  Auto Monocyte % : x  Auto Eosinophil % : x  Auto Basophil % : x      08-26    140  |  103  |  35<H>  ----------------------------<  103<H>  3.3<L>   |  27  |  1.70<H>    Ca    8.0<L>      26 Aug 2021 07:59  Phos  2.6     08-24  Mg     1.8     08-24      Hemoglobin A1C:       Vitamin B12         RADIOLOGY    ANALYSIS AND PLAN:  This is an 86-year-old with an episode of fall, tremors, and altered mental status.  For episode of fall, this appeared to be mechanical in nature as per conversation with the spouse, no seizure-like activity was reported and no syncopal event.  For altered mental status, I suspect most likely metabolic encephalopathy, possible underlying urinary tract infection along with dementia becoming more prominent in the hospital setting.  Antibiotics as needed.  For essential tremors which appeared to be more prominent most likely secondary to the hospital setting and undergoing procedures in a different environment, stressors would exacerbate essential tremors.  The patient appears to be on medications for essential tremors.  I would not increase them at present.  The patient does have a history of becoming lethargic, most likely from his Primidone.  For history of dementia, continue the patient on his Aricept.  For history of high cholesterol, continue the patient on his statin.  monitor SBP if possible keep sbp above 100     Spoke with the spouseFelicia at [phone], alternating number is [phone]. 8/26  She understands the reasoning and thought process and is agreeable with the continuation of the current medications.  They will follow up with her outside neurologist.    Greater than 40 minutes of time was spent with the patient, plan of care, reviewing data, speaking to the family and multidisciplinary healthcare team with greater than 50% of that time in counseling and care coordination.    Thank you for the courtesy of this consultation.

## 2021-08-27 ENCOUNTER — TRANSCRIPTION ENCOUNTER (OUTPATIENT)
Age: 86
End: 2021-08-27

## 2021-08-27 VITALS
RESPIRATION RATE: 19 BRPM | TEMPERATURE: 98 F | HEART RATE: 51 BPM | OXYGEN SATURATION: 91 % | SYSTOLIC BLOOD PRESSURE: 118 MMHG | DIASTOLIC BLOOD PRESSURE: 54 MMHG

## 2021-08-27 LAB
ANION GAP SERPL CALC-SCNC: 9 MMOL/L — SIGNIFICANT CHANGE UP (ref 5–17)
BLOOD GAS COMMENTS ARTERIAL: SIGNIFICANT CHANGE UP
BLOOD GAS COMMENTS ARTERIAL: SIGNIFICANT CHANGE UP
BUN SERPL-MCNC: 35 MG/DL — HIGH (ref 7–23)
CALCIUM SERPL-MCNC: 8.3 MG/DL — LOW (ref 8.5–10.1)
CHLORIDE SERPL-SCNC: 105 MMOL/L — SIGNIFICANT CHANGE UP (ref 96–108)
CO2 SERPL-SCNC: 27 MMOL/L — SIGNIFICANT CHANGE UP (ref 22–31)
CREAT SERPL-MCNC: 1.3 MG/DL — SIGNIFICANT CHANGE UP (ref 0.5–1.3)
GAS PNL BLDA: SIGNIFICANT CHANGE UP
GLUCOSE SERPL-MCNC: 122 MG/DL — HIGH (ref 70–99)
HCT VFR BLD CALC: 32.4 % — LOW (ref 39–50)
HGB BLD-MCNC: 11.1 G/DL — LOW (ref 13–17)
MCHC RBC-ENTMCNC: 31.1 PG — SIGNIFICANT CHANGE UP (ref 27–34)
MCHC RBC-ENTMCNC: 34.3 GM/DL — SIGNIFICANT CHANGE UP (ref 32–36)
MCV RBC AUTO: 90.8 FL — SIGNIFICANT CHANGE UP (ref 80–100)
NRBC # BLD: 0 /100 WBCS — SIGNIFICANT CHANGE UP (ref 0–0)
PLATELET # BLD AUTO: 171 K/UL — SIGNIFICANT CHANGE UP (ref 150–400)
POTASSIUM SERPL-MCNC: 4.4 MMOL/L — SIGNIFICANT CHANGE UP (ref 3.5–5.3)
POTASSIUM SERPL-SCNC: 4.4 MMOL/L — SIGNIFICANT CHANGE UP (ref 3.5–5.3)
RBC # BLD: 3.57 M/UL — LOW (ref 4.2–5.8)
RBC # FLD: 13.3 % — SIGNIFICANT CHANGE UP (ref 10.3–14.5)
SARS-COV-2 RNA SPEC QL NAA+PROBE: SIGNIFICANT CHANGE UP
SODIUM SERPL-SCNC: 141 MMOL/L — SIGNIFICANT CHANGE UP (ref 135–145)
WBC # BLD: 7.3 K/UL — SIGNIFICANT CHANGE UP (ref 3.8–10.5)
WBC # FLD AUTO: 7.3 K/UL — SIGNIFICANT CHANGE UP (ref 3.8–10.5)

## 2021-08-27 PROCEDURE — 87086 URINE CULTURE/COLONY COUNT: CPT

## 2021-08-27 PROCEDURE — 82550 ASSAY OF CK (CPK): CPT

## 2021-08-27 PROCEDURE — 76000 FLUOROSCOPY <1 HR PHYS/QHP: CPT

## 2021-08-27 PROCEDURE — 87040 BLOOD CULTURE FOR BACTERIA: CPT

## 2021-08-27 PROCEDURE — C1889: CPT

## 2021-08-27 PROCEDURE — 82803 BLOOD GASES ANY COMBINATION: CPT

## 2021-08-27 PROCEDURE — 94640 AIRWAY INHALATION TREATMENT: CPT

## 2021-08-27 PROCEDURE — 93005 ELECTROCARDIOGRAM TRACING: CPT

## 2021-08-27 PROCEDURE — 85730 THROMBOPLASTIN TIME PARTIAL: CPT

## 2021-08-27 PROCEDURE — 86769 SARS-COV-2 COVID-19 ANTIBODY: CPT

## 2021-08-27 PROCEDURE — 85027 COMPLETE CBC AUTOMATED: CPT

## 2021-08-27 PROCEDURE — 82140 ASSAY OF AMMONIA: CPT

## 2021-08-27 PROCEDURE — 74177 CT ABD & PELVIS W/CONTRAST: CPT | Mod: ME

## 2021-08-27 PROCEDURE — 74230 X-RAY XM SWLNG FUNCJ C+: CPT

## 2021-08-27 PROCEDURE — 86900 BLOOD TYPING SEROLOGIC ABO: CPT

## 2021-08-27 PROCEDURE — 96374 THER/PROPH/DIAG INJ IV PUSH: CPT

## 2021-08-27 PROCEDURE — 84145 PROCALCITONIN (PCT): CPT

## 2021-08-27 PROCEDURE — 85610 PROTHROMBIN TIME: CPT

## 2021-08-27 PROCEDURE — 82962 GLUCOSE BLOOD TEST: CPT

## 2021-08-27 PROCEDURE — C1769: CPT

## 2021-08-27 PROCEDURE — 73502 X-RAY EXAM HIP UNI 2-3 VIEWS: CPT

## 2021-08-27 PROCEDURE — 92611 MOTION FLUOROSCOPY/SWALLOW: CPT

## 2021-08-27 PROCEDURE — 80061 LIPID PANEL: CPT

## 2021-08-27 PROCEDURE — 72125 CT NECK SPINE W/O DYE: CPT

## 2021-08-27 PROCEDURE — 92610 EVALUATE SWALLOWING FUNCTION: CPT

## 2021-08-27 PROCEDURE — U0005: CPT

## 2021-08-27 PROCEDURE — 85025 COMPLETE CBC W/AUTO DIFF WBC: CPT

## 2021-08-27 PROCEDURE — 97166 OT EVAL MOD COMPLEX 45 MIN: CPT

## 2021-08-27 PROCEDURE — 86901 BLOOD TYPING SEROLOGIC RH(D): CPT

## 2021-08-27 PROCEDURE — 71045 X-RAY EXAM CHEST 1 VIEW: CPT

## 2021-08-27 PROCEDURE — G1004: CPT

## 2021-08-27 PROCEDURE — 70450 CT HEAD/BRAIN W/O DYE: CPT

## 2021-08-27 PROCEDURE — C1713: CPT

## 2021-08-27 PROCEDURE — 97162 PT EVAL MOD COMPLEX 30 MIN: CPT

## 2021-08-27 PROCEDURE — 93306 TTE W/DOPPLER COMPLETE: CPT

## 2021-08-27 PROCEDURE — 36600 WITHDRAWAL OF ARTERIAL BLOOD: CPT

## 2021-08-27 PROCEDURE — 73552 X-RAY EXAM OF FEMUR 2/>: CPT

## 2021-08-27 PROCEDURE — 97530 THERAPEUTIC ACTIVITIES: CPT

## 2021-08-27 PROCEDURE — 84100 ASSAY OF PHOSPHORUS: CPT

## 2021-08-27 PROCEDURE — 81001 URINALYSIS AUTO W/SCOPE: CPT

## 2021-08-27 PROCEDURE — 80048 BASIC METABOLIC PNL TOTAL CA: CPT

## 2021-08-27 PROCEDURE — 84443 ASSAY THYROID STIM HORMONE: CPT

## 2021-08-27 PROCEDURE — 97116 GAIT TRAINING THERAPY: CPT

## 2021-08-27 PROCEDURE — 80053 COMPREHEN METABOLIC PANEL: CPT

## 2021-08-27 PROCEDURE — 36415 COLL VENOUS BLD VENIPUNCTURE: CPT

## 2021-08-27 PROCEDURE — 99285 EMERGENCY DEPT VISIT HI MDM: CPT | Mod: 25

## 2021-08-27 PROCEDURE — 96376 TX/PRO/DX INJ SAME DRUG ADON: CPT

## 2021-08-27 PROCEDURE — 71045 X-RAY EXAM CHEST 1 VIEW: CPT | Mod: 26

## 2021-08-27 PROCEDURE — 93880 EXTRACRANIAL BILAT STUDY: CPT

## 2021-08-27 PROCEDURE — 83036 HEMOGLOBIN GLYCOSYLATED A1C: CPT

## 2021-08-27 PROCEDURE — U0003: CPT

## 2021-08-27 PROCEDURE — 96375 TX/PRO/DX INJ NEW DRUG ADDON: CPT

## 2021-08-27 PROCEDURE — 99232 SBSQ HOSP IP/OBS MODERATE 35: CPT

## 2021-08-27 PROCEDURE — 71260 CT THORAX DX C+: CPT | Mod: ME

## 2021-08-27 PROCEDURE — 92526 ORAL FUNCTION THERAPY: CPT

## 2021-08-27 PROCEDURE — 83735 ASSAY OF MAGNESIUM: CPT

## 2021-08-27 PROCEDURE — 86850 RBC ANTIBODY SCREEN: CPT

## 2021-08-27 PROCEDURE — A9698: CPT

## 2021-08-27 PROCEDURE — 97535 SELF CARE MNGMENT TRAINING: CPT

## 2021-08-27 RX ORDER — OXYCODONE HYDROCHLORIDE 5 MG/1
1 TABLET ORAL
Qty: 0 | Refills: 0 | DISCHARGE
Start: 2021-08-27

## 2021-08-27 RX ORDER — ACETAMINOPHEN 500 MG
2 TABLET ORAL
Qty: 0 | Refills: 0 | DISCHARGE
Start: 2021-08-27

## 2021-08-27 RX ORDER — RIVAROXABAN 15 MG-20MG
1 KIT ORAL
Qty: 0 | Refills: 0 | DISCHARGE
Start: 2021-08-27

## 2021-08-27 RX ORDER — SENNA PLUS 8.6 MG/1
2 TABLET ORAL
Qty: 0 | Refills: 0 | DISCHARGE
Start: 2021-08-27

## 2021-08-27 RX ORDER — ALBUTEROL 90 UG/1
1 AEROSOL, METERED ORAL
Qty: 0 | Refills: 0 | DISCHARGE
Start: 2021-08-27

## 2021-08-27 RX ORDER — TIOTROPIUM BROMIDE 18 UG/1
1 CAPSULE ORAL; RESPIRATORY (INHALATION)
Qty: 0 | Refills: 0 | DISCHARGE
Start: 2021-08-27

## 2021-08-27 RX ORDER — MAGNESIUM HYDROXIDE 400 MG/1
30 TABLET, CHEWABLE ORAL
Qty: 0 | Refills: 0 | DISCHARGE
Start: 2021-08-27

## 2021-08-27 RX ORDER — PANTOPRAZOLE SODIUM 20 MG/1
1 TABLET, DELAYED RELEASE ORAL
Qty: 0 | Refills: 0 | DISCHARGE
Start: 2021-08-27

## 2021-08-27 RX ORDER — ACETAMINOPHEN 500 MG
3 TABLET ORAL
Qty: 0 | Refills: 0 | DISCHARGE
Start: 2021-08-27

## 2021-08-27 RX ORDER — ASCORBIC ACID 60 MG
1 TABLET,CHEWABLE ORAL
Qty: 0 | Refills: 0 | DISCHARGE
Start: 2021-08-27

## 2021-08-27 RX ORDER — BUDESONIDE AND FORMOTEROL FUMARATE DIHYDRATE 160; 4.5 UG/1; UG/1
0 AEROSOL RESPIRATORY (INHALATION)
Qty: 0 | Refills: 0 | DISCHARGE
Start: 2021-08-27

## 2021-08-27 RX ORDER — FOLIC ACID 0.8 MG
1 TABLET ORAL
Qty: 0 | Refills: 0 | DISCHARGE
Start: 2021-08-27

## 2021-08-27 RX ADMIN — Medication 1 TABLET(S): at 14:24

## 2021-08-27 RX ADMIN — Medication 1 DROP(S): at 14:27

## 2021-08-27 RX ADMIN — Medication 500 MILLIGRAM(S): at 05:33

## 2021-08-27 RX ADMIN — Medication 1200 MILLIGRAM(S): at 05:33

## 2021-08-27 RX ADMIN — PRIMIDONE 50 MILLIGRAM(S): 250 TABLET ORAL at 05:42

## 2021-08-27 RX ADMIN — Medication 25 MILLIGRAM(S): at 05:32

## 2021-08-27 RX ADMIN — TIOTROPIUM BROMIDE 1 CAPSULE(S): 18 CAPSULE ORAL; RESPIRATORY (INHALATION) at 05:33

## 2021-08-27 RX ADMIN — BUDESONIDE AND FORMOTEROL FUMARATE DIHYDRATE 2 PUFF(S): 160; 4.5 AEROSOL RESPIRATORY (INHALATION) at 05:35

## 2021-08-27 RX ADMIN — RIVAROXABAN 10 MILLIGRAM(S): KIT at 14:23

## 2021-08-27 RX ADMIN — PANTOPRAZOLE SODIUM 40 MILLIGRAM(S): 20 TABLET, DELAYED RELEASE ORAL at 05:33

## 2021-08-27 RX ADMIN — Medication 650 MILLIGRAM(S): at 14:24

## 2021-08-27 RX ADMIN — Medication 1 MILLIGRAM(S): at 14:24

## 2021-08-27 NOTE — PROGRESS NOTE ADULT - PROBLEM SELECTOR PROBLEM 6
HLD (hyperlipidemia)
Prophylactic measure
Prophylactic measure
HLD (hyperlipidemia)
Prophylactic measure
Prophylactic measure
HLD (hyperlipidemia)
HLD (hyperlipidemia)

## 2021-08-27 NOTE — PROGRESS NOTE ADULT - PROBLEM SELECTOR PROBLEM 7
HLD (hyperlipidemia)
COPD (chronic obstructive pulmonary disease)

## 2021-08-27 NOTE — DISCHARGE NOTE NURSING/CASE MANAGEMENT/SOCIAL WORK - NSDCPEFALRISK_GEN_ALL_CORE
For information on Fall & injury Prevention, visit [email or web address]

## 2021-08-27 NOTE — PROGRESS NOTE ADULT - SUBJECTIVE AND OBJECTIVE BOX
[facility] -- Leonard Mercado, Shaquille, Deana, Beau Mathis Savella  Office # [phone]      Follow Up:  SVT    Subjective/Observations:     Seen at bedside in no acute distress  unable to provide any ROS    PAST MEDICAL & SURGICAL HISTORY:  Scoliosis    Chronic cough    Osteoporosis    Occasional tremors  severe essential tremors    Memory loss  dementia    COPD, severe    HLD (hyperlipidemia)    H/O partial resection of colon        MEDICATIONS  (STANDING):  ALBUTerol    90 MICROgram(s) HFA Inhaler 1 Puff(s) Inhalation every 4 hours  ascorbic acid 500 milliGRAM(s) Oral two times a day  budesonide 160 MICROgram(s)/formoterol 4.5 MICROgram(s) Inhaler 2 Puff(s) Inhalation two times a day  folic acid 1 milliGRAM(s) Oral daily  guaiFENesin ER 1200 milliGRAM(s) Oral every 12 hours  lactobacillus acidophilus 1 Tablet(s) Oral daily  latanoprost 0.005% Ophthalmic Solution 1 Drop(s) Both EYES at bedtime  multivitamin 1 Tablet(s) Oral daily  pantoprazole    Tablet 40 milliGRAM(s) Oral before breakfast  predniSONE   Tablet 50 milliGRAM(s) Oral every 24 hours  primidone 50 milliGRAM(s) Oral two times a day  propranolol 80 milliGRAM(s) Oral two times a day  rivaroxaban 10 milliGRAM(s) Oral daily  senna 2 Tablet(s) Oral at bedtime  tamsulosin 0.4 milliGRAM(s) Oral at bedtime  timolol 0.5% Solution 1 Drop(s) Both EYES daily  tiotropium 18 MICROgram(s) Capsule 1 Capsule(s) Inhalation daily  topiramate 25 milliGRAM(s) Oral two times a day    MEDICATIONS  (PRN):  acetaminophen   Tablet .. 650 milliGRAM(s) Oral every 6 hours PRN Temp greater or equal to 38C (100.4F), Mild Pain (1 - 3)  magnesium hydroxide Suspension 30 milliLiter(s) Oral daily PRN Constipation  ondansetron Injectable 4 milliGRAM(s) IV Push every 6 hours PRN Nausea and/or Vomiting  oxyCODONE    IR 10 milliGRAM(s) Oral every 4 hours PRN Severe Pain (7 - 10)  oxyCODONE    IR 5 milliGRAM(s) Oral every 4 hours PRN Moderate Pain (4 - 6)      Allergies    azithromycin (Rash)    Intolerances    lactose (Diarrhea)      Vital Signs Last 24 Hrs  T(C): 36.4 (27 Aug 2021 04:43), Max: 37.1 (26 Aug 2021 19:31)  T(F): 97.5 (27 Aug 2021 04:43), Max: 98.7 (26 Aug 2021 19:31)  HR: 61 (27 Aug 2021 04:43) (54 - 74)  BP: 135/62 (27 Aug 2021 04:43) (106/65 - 149/77)  BP(mean): --  RR: 17 (27 Aug 2021 04:43) (17 - 18)  SpO2: 92% (27 Aug 2021 04:43) (89% - 92%)    I&O's Summary    26 Aug 2021 07:01  -  27 Aug 2021 07:00  --------------------------------------------------------  IN: 120 mL / OUT: 0 mL / NET: 120 mL          PHYSICAL EXAM:  TELE: not on tele   Constitutional: NAD, awake and alert, well-developed  HEENT: Moist Mucous Membranes, Anicteric  Pulmonary: Non-labored, breath sounds diffuse rhonchi   Cardiovascular: Regular, S1 and S2 nl, No murmurs, rubs, gallops or clicks  Gastrointestinal: Bowel Sounds present, soft, nontender.   Lymph: No lymphadenopathy. No peripheral edema.  Skin: No visible rashes or ulcers.  Psych:  Mood & affect appropriate    LABS: All Labs Reviewed:                        11.1   7.30  )-----------( 171      ( 27 Aug 2021 06:50 )             32.4                         10.2   8.64  )-----------( 163      ( 26 Aug 2021 07:59 )             30.0                         11.1   10.67 )-----------( 158      ( 25 Aug 2021 07:12 )             32.3     27 Aug 2021 06:50    141    |  105    |  35     ----------------------------<  122    4.4     |  27     |  1.30   26 Aug 2021 07:59    140    |  103    |  35     ----------------------------<  103    3.3     |  27     |  1.70   25 Aug 2021 07:12    138    |  103    |  29     ----------------------------<  105    3.6     |  25     |  1.40     Ca    8.3        27 Aug 2021 06:50  Ca    8.0        26 Aug 2021 07:59  Ca    7.9        25 Aug 2021 07:12  Phos  2.6       24 Aug 2021 12:06  Mg     2.1       26 Aug 2021 13:09  Mg     1.8       24 Aug 2021 12:06      :  ECHO TTE WO CON COMP W DOPP         PROCEDURE DATE:  08/23/2021        INTERPRETATION:  INDICATION: Syncope  Sonographer AS    Blood Pressure 121/59    Height 177.8 cm     Weight 74.8 kg       BSA 1.9 sq m    Dimensions:  LA unavailable  Normal Values: 2.0 - 4.0 cm  Ao unavailable        Normal Values: 2.0 - 3.8 cm  SEPTUM unavailable       Normal Values: 0.6 - 1.2 cm  PWT unavailable       Normal Values: 0.6 - 1.1 cm  LVIDd unavailable         Normal Values: 3.0 - 5.6 cm  LVIDs unavailable         Normal Values: 1.8 - 4.0 cm    OBSERVATIONS:  Technically difficult and extremely limited study  Unable to accurately assess ventricular or valvular function  Mitral Valve: Not well-visualized  Aortic Valve/Aorta: Not well-visualized  Tricuspid Valve: Not well-visualized  Pulmonic Valve: Not well-visualized  Left Atrium: Not well-visualized  Right Atrium: Not well-visualized  Left Ventricle: Not well-visualized  Right Ventricle: Not well-visualized    IMPRESSION:  Technically difficult and extremely limited study  Unable to accurately assess ventricular or valvular function    --- End of Report ---    JORDYN EUGENE MD; Attending Cardiologist  This document has been electronically signed. Aug 24 2021 12:53PM      EXAM:  XR CHEST PORTABLE URGENT 1V                          PROCEDURE DATE:  08/24/2021      INTERPRETATION:  Chest portable.    Clinical History: Supraventricular tachycardia.    Comparison: 8/22/2021.    Single AP view submitted.  The patient is rotated.    The evaluation of the cardiomediastinal silhouette is limited on portable technique.  Mildly tortuous, calcified aorta.    Low lung volumes are present.  There is a probable small right-sided pleural effusion with underlying right infrahilar/lower lobe airspace consolidation.  There is patchy consolidation at the left lung base.    Impression:    Findings as discussed above.    --- End of Report ---  GISSELLE THOMAS MD; Attending Radiologist  This document has been electronically signed. Aug 24 2021 11:45AM    Ventricular Rate 111 BPM    Atrial Rate 111 BPM    P-R Interval 160 ms    QRS Duration 82 ms    Q-T Interval 322 ms    QTC Calculation(Bazett) 437 ms    P Axis 28 degrees    R Axis 6 degrees    T Axis -21 degrees    Diagnosis Line Sinus tachycardia  Inferior infarct , age undetermined  Anterior infarct , age undetermined  Abnormal ECG

## 2021-08-27 NOTE — PROGRESS NOTE ADULT - PROBLEM SELECTOR PROBLEM 9
Acute UTI
BPH (benign prostatic hyperplasia)

## 2021-08-27 NOTE — PROGRESS NOTE ADULT - ASSESSMENT
Pt. with left hip fx  postop orif.   Needs extensive rehab.   COPD with some mucus plugging. Continue inhalers, hhn. Chest PT.  Placement.    Has UTI.   Dementia    DVT prophylaxis.

## 2021-08-27 NOTE — PROGRESS NOTE ADULT - PROBLEM SELECTOR PROBLEM 2
Sinus bradycardia
Fall, subsequent encounter
Sinus bradycardia

## 2021-08-27 NOTE — PROGRESS NOTE ADULT - ASSESSMENT
Patient is a 86 year old male with PMH of dementia, essential tremor, COPD not on home O2, severe scoliosis, HLD, partial colon resection who presented after a mechanical fall and admitted for left femur fracture.     Proximal L comminuted intertrochanteric fracture s/p mechanical fall   - s/p subtrochanteric extension 8/23   - Orthopedics following    COPD with mucus plugging    - Pulmonary following, on chest percussion and inhalers    - CXR with mild atelectatic infiltrate at L base increased, mild R base fluid improved   - afebrile, no leukocytosis - if patient spikes fever, can start on ceftriaxone 1g IV Q24h    Ruled out UTI   - UA with WBC 26-50, mod LE, +nitrite, bacteria and occasional epithelial cells - suspect contaminated specimen    - urine culture with CoNS   - pt asymptomatic, denies any urinary complaints, afebrile   - s/p ceftriaxone 8/22 > pip-tazo now (total d5 of abx)   - continue off antibiotics     Dr. Casper will be covering over the weekend, please feel free to call [phone] with any questions.    Lacy Yanez M.D.  [facility], Division of Infectious Diseases  [phone]  After 5pm on weekdays and all day on weekends - please call [phone]

## 2021-08-27 NOTE — CHART NOTE - NSCHARTNOTEFT_GEN_A_CORE

## 2021-08-27 NOTE — PROGRESS NOTE ADULT - PROVIDER SPECIALTY LIST ADULT
Neurology
Neurology
Cardiology
Anesthesia
Cardiology
Cardiology
Infectious Disease
Neurology
Orthopedics
Orthopedics
Cardiology
Infectious Disease
Orthopedics
Physiatry
Physiatry
Orthopedics
Pulmonology
Family Medicine
Hospitalist

## 2021-08-27 NOTE — PROGRESS NOTE ADULT - SUBJECTIVE AND OBJECTIVE BOX
[facility], Division of Infectious Diseases  TRAVIS Baker Y. Patel, S. Shah  [phone]  ([phone] - weekdays after 5pm and weekends)    Name: MELO PIKE  Age/Gender: 86y Male  MRN: [id number]    Interval History:  Patient seen this morning, confused, feels fine.   Has no new complaints.   Notes reviewed. Afebrile.     Allergies: azithromycin (Rash)    Objective:  Vitals:   T(F): 98.3 (08-27-21 @ 13:00), Max: 98.7 (08-26-21 @ 19:31)  HR: 51 (08-27-21 @ 13:00) (51 - 74)  BP: 118/54 (08-27-21 @ 13:00) (106/65 - 149/77)  RR: 19 (08-27-21 @ 13:00) (17 - 19)  SpO2: 91% (08-27-21 @ 13:00) (90% - 92%)  Physical Examination:  General: no acute distress, NC  HEENT: NC/AT, EOMI, anicteric, neck supple  Cardio: S1, S2 present, normal rate, no murmur  Resp: decreased breath sounds bilaterally   Abd: soft, NT, ND, + BS  Neuro: awake, alert, no obvious focal deficits  Ext: no edema or cyanosis, moving extremities  Skin: warm, dry, no visible rash  Psych: appropriate affect and mood for situation  Lines: PIV    Laboratory Studies:  CBC:                       11.1   7.30  )-----------( 171      ( 27 Aug 2021 06:50 )             32.4     WBC Trend:  7.30 08-27-21 @ 06:50  8.64 08-26-21 @ 07:59  10.67 08-25-21 @ 07:12  11.33 08-24-21 @ 12:06  11.15 08-23-21 @ 19:23  8.06 08-23-21 @ 06:55  6.75 08-22-21 @ 20:15    CMP: 08-27    141  |  105  |  35<H>  ----------------------------<  122<H>  4.4   |  27  |  1.30    Ca    8.3<L>      27 Aug 2021 06:50  Mg     2.1     08-26    Microbiology: reviewed   Culture - Blood (collected 08-23-21 @ 06:13)  Source: .Blood Blood-Peripheral  Preliminary Report (08-24-21 @ 07:01):    No growth to date.    Culture - Blood (collected 08-23-21 @ 06:13)  Source: .Blood Blood-Peripheral  Preliminary Report (08-24-21 @ 07:01):    No growth to date.    Culture - Urine (collected 08-23-21 @ 05:40)  Source: Clean Catch Clean Catch (Midstream)  Final Report (08-26-21 @ 14:41):    >100,000 CFU/ml Coag Negative Staphylococcus "Susceptibilities not    performed"    Radiology: reviewed   Xray Chest 1 View-PORTABLE IMMEDIATE (Xray Chest 1 View-PORTABLE IMMEDIATE .) (08.27.21 @ 06:57) >INTERPRETATION:  AP chest on August 27, 2021 at 6:46 AM. Patient had a recent left hip fracture repaired on August 23. Presently patient has cough. Heart is enlarged. Prominent aorta again noted. There is a mild atelectatic infiltrate at left base increased from August 24. Mild right base fluid on the earlier study has improved. IMPRESSION: As above.    Xray Chest 1 View- PORTABLE-Urgent (Xray Chest 1 View- PORTABLE-Urgent .) (08.24.21 @ 09:34) >The evaluation of the cardiomediastinal silhouette is limited on portable technique. Mildly tortuous, calcified aorta. Low lung volumes are present. There is a probable small right-sided pleural effusion with underlying right infrahilar/lower lobe airspace consolidation. There is patchy consolidation at the left lung base.  Impression: Findings as discussed above.    Medications:  acetaminophen   Tablet .. 650 milliGRAM(s) Oral every 6 hours PRN  ALBUTerol    90 MICROgram(s) HFA Inhaler 1 Puff(s) Inhalation every 4 hours  ascorbic acid 500 milliGRAM(s) Oral two times a day  budesonide 160 MICROgram(s)/formoterol 4.5 MICROgram(s) Inhaler 2 Puff(s) Inhalation two times a day  folic acid 1 milliGRAM(s) Oral daily  guaiFENesin ER 1200 milliGRAM(s) Oral every 12 hours  lactobacillus acidophilus 1 Tablet(s) Oral daily  latanoprost 0.005% Ophthalmic Solution 1 Drop(s) Both EYES at bedtime  magnesium hydroxide Suspension 30 milliLiter(s) Oral daily PRN  multivitamin 1 Tablet(s) Oral daily  ondansetron Injectable 4 milliGRAM(s) IV Push every 6 hours PRN  oxyCODONE    IR 10 milliGRAM(s) Oral every 4 hours PRN  oxyCODONE    IR 5 milliGRAM(s) Oral every 4 hours PRN  pantoprazole    Tablet 40 milliGRAM(s) Oral before breakfast  predniSONE   Tablet 50 milliGRAM(s) Oral every 24 hours  primidone 50 milliGRAM(s) Oral two times a day  propranolol 80 milliGRAM(s) Oral two times a day  rivaroxaban 10 milliGRAM(s) Oral daily  senna 2 Tablet(s) Oral at bedtime  tamsulosin 0.4 milliGRAM(s) Oral at bedtime  timolol 0.5% Solution 1 Drop(s) Both EYES daily  tiotropium 18 MICROgram(s) Capsule 1 Capsule(s) Inhalation daily  topiramate 25 milliGRAM(s) Oral two times a day    Antimicrobials:  s/p pip-tazo 8/24-8/25  s/p ceftriaxone 8/22-8/23  s/p cefazolin 8/23

## 2021-08-27 NOTE — PROGRESS NOTE ADULT - ATTENDING COMMENTS
- Admission EKG shows sinus Poncho with HR of 48 with no ischemic changes.  - Episodes of SVT x 2 in setting of missed bb spontaneously broke.  No further noted.    - Continue home propanolol 80mg Po BID
rhonchi on exam, probable aspiration  had brief svt, without recurrence in the last 24h  no addl lasix at this time  confused
Seen/examined. agree with above.  rrt for svt this morning x 2, which broke spontaneously  sig rhonchi on exam, combination of volume overload and aspiration  check CXR and give iv lasix  restart on oral BB
no acute ischemia or vol ol  worsening o2 req  agitated and confused  no further svt

## 2021-08-27 NOTE — PROGRESS NOTE ADULT - PROBLEM SELECTOR PROBLEM 5
Essential tremor

## 2021-08-27 NOTE — PROGRESS NOTE ADULT - ASSESSMENT
86M with L hip fx, now s/p operative repair, with poor progression in PT with respect to functional deficits.    Although he is with a qualifying diagnosis for acute rehab, patient better suited for subacute in light of his poor progression in PT recently. Continue with current pain medication regimen. Incentive spirometry, ankle pumps, elevation of legs. Patient a fall risk due to poor balance and weakness, also on xarelto for DVT ppx represents bleeding risk. Keep bed low to the ground, intermittent monitoring due to cognitive deficits.

## 2021-08-27 NOTE — PROGRESS NOTE ADULT - REASON FOR ADMISSION
left hip fracture

## 2021-08-27 NOTE — PROGRESS NOTE ADULT - PROBLEM SELECTOR PROBLEM 1
COPD (chronic obstructive pulmonary disease)
Fracture, proximal femur
Fracture, proximal femur
Fracture of femur, intertrochanteric, left, closed, initial encounter
COPD (chronic obstructive pulmonary disease)
Fracture, proximal femur

## 2021-08-27 NOTE — DISCHARGE NOTE NURSING/CASE MANAGEMENT/SOCIAL WORK - PATIENT PORTAL LINK FT
You can access the FollowMyHealth Patient Portal offered by [facility] by registering at the following website: [email or web address]. By joining [facility]’s FollowMyHealth portal, you will also be able to view your health information using other applications (apps) compatible with our system.

## 2021-08-27 NOTE — PROGRESS NOTE ADULT - SUBJECTIVE AND OBJECTIVE BOX
PULMONARY/CRITICAL CARE    DOS 21  No fever.  Intermittently confused.  Less sob now.   Patient is a 86y old  Male who presents with a chief complaint of left hip fracture (23 Aug 2021 08:25)    BRIEF HOSPITAL COURSE: ***86 M PMHX dementia, essential tremor, COPD not on home O2, severe scoliosis, HLD, hx of partial colon resection, here for mechanical fall at 18:30. Pt was walking from the bathroom to the kitchen and tripped. Occurred on carpeted floor, using his cane, landed on left side. No head trauma, no LOC. Pt denies palpitations, CP, SOB, dizziness, lightheadedness prior to fall. As per wife, pt has been his usual self. Pt has slow heart rate at rest, on propanolol for essential tremor. Patient is a poor historian and history is limited.    In the ED,  VS: Tmax 99.5 , bp 159/69, RR 18, 91% on RA. Labs significant for alk p 132.   CT C/A/P: Comminuted intertrochanteric fracture of the proximal left femur. Mucoid impacted airways with chronic aspiration in the right lower lobe. Chronic stable similar to prior imaging: simple hepatic cysts largest measuring 5.7 x 5.2 cm in size. Liver hemangioma 2.3 x 1.9 cm. 6 mm non-obstructing calculus in the lower pole of the left kidney and midpole of the left kidney. Diffuse bladder wall thickening of the of the urinary bladder most prominent at the posterior wall, and numerous bladder diverticulum again demonstrated. Small calcification at the posterior left bladder wall again demonstrated.  EKG sinus shanti rate 48 bpm, no ischemic changes.  (22 Aug 2021 22:57)    Interval HPI: Patient is a 86 year old male with PMH of dementia, essential tremor, COPD (not on home O2), severe scoliosis, HLD, hx of partial colon resection. He was BIBA s/p suspected mechanical fall; pt states he was walking from the bathroom to the kitchen when he tripped. Pt landed on his left side injuring his L rib cage and L proximal femur; pt denies head trauma, LOC. Pt seen and evaluated at bedside at 8:15 today. HR at 57 bpm at time of exam. Pt rates pain of left hip at 8/10 and denies any pain of L ribs. EKG HR 48 bpm, no ischemic changes. Pt denies palpitations, CP, SOB, dizziness, lightheadedness. Pt denies any cardiac Hx and reports he has never seen a Cardiologist. Pt takes propranolol for essential tremor. Of note, pt presented to the [place] ED in 2018 when marked sinus bradycardia of 43 bpm was noted on EKG.       Events last 24 hours: ***    PAST MEDICAL & SURGICAL HISTORY:  Scoliosis    Chronic cough    Osteoporosis    Occasional tremors  severe essential tremors    Memory loss  dementia    COPD, severe    HLD (hyperlipidemia)    H/O partial resection of colon      Allergies    azithromycin (Rash)    Intolerances      FAMILY HISTORY: distant smoker, no etoh  FHx: dementia (Mother)    FH: colon cancer (Father)    FH: Parkinson&#x27;s disease (Sibling)    FH: stroke (Sibling)          Medications:  cefTRIAXone   IVPB 1000 milliGRAM(s) IV Intermittent every 24 hours  cefTRIAXone   IVPB        tamsulosin 0.4 milliGRAM(s) Oral at bedtime    albuterol/ipratropium for Nebulization 3 milliLiter(s) Nebulizer every 6 hours    donepezil 5 milliGRAM(s) Oral at bedtime  morphine  - Injectable 2 milliGRAM(s) IV Push every 4 hours PRN  primidone 50 milliGRAM(s) Oral two times a day  topiramate 25 milliGRAM(s) Oral two times a day        senna 2 Tablet(s) Oral at bedtime        lactated ringers. 1000 milliLiter(s) IV Continuous <Continuous>      latanoprost 0.005% Ophthalmic Solution 1 Drop(s) Both EYES at bedtime  timolol 0.5% Solution 1 Drop(s) Both EYES daily    lactobacillus acidophilus 1 Tablet(s) Oral daily          ICU Vital Signs Last 24 Hrs  T(C): 36.7 (23 Aug 2021 07:23), Max: 37.5 (22 Aug 2021 20:11)  T(F): 98 (23 Aug 2021 07:23), Max: 99.5 (22 Aug 2021 20:11)  HR: 56 (23 Aug 2021 07:23) (44 - 103)  BP: 121/59 (23 Aug 2021 07:23) (121/59 - 169/73)  BP(mean): --  ABP: --  ABP(mean): --  RR: 18 (23 Aug 2021 07:23) (17 - 18)  SpO2: 100% (23 Aug 2021 07:23) (91% - 100%)    Vital Signs Last 24 Hrs  T(C): 36.7 (23 Aug 2021 07:23), Max: 37.5 (22 Aug 2021 20:11)  T(F): 98 (23 Aug 2021 07:23), Max: 99.5 (22 Aug 2021 20:11)  HR: 56 (23 Aug 2021 07:23) (44 - 103)  BP: 121/59 (23 Aug 2021 07:23) (121/59 - 169/73)  BP(mean): --  RR: 18 (23 Aug 2021 07:23) (17 - 18)  SpO2: 100% (23 Aug 2021 07:23) (91% - 100%)        I&O's Detail        LABS:                        13.4   8.06  )-----------( 176      ( 23 Aug 2021 06:55 )             40.6     08-23    141  |  106  |  23  ----------------------------<  96  4.1   |  26  |  0.68    Ca    8.2<L>      23 Aug 2021 06:55    TPro  6.6  /  Alb  2.7<L>  /  TBili  0.6  /  DBili  x   /  AST  16  /  ALT  23  /  AlkPhos  120  08-23      CARDIAC MARKERS ( 22 Aug 2021 20:15 )  x     / x     / 46 U/L / x     / x          CAPILLARY BLOOD GLUCOSE        PT/INR - ( 23 Aug 2021 08:41 )   PT: 12.4 sec;   INR: 1.06 ratio         PTT - ( 23 Aug 2021 08:41 )  PTT:30.9 sec  Urinalysis Basic - ( 22 Aug 2021 23:19 )    Color: Yellow / Appearance: Slightly Turbid / S.010 / pH: x  Gluc: x / Ketone: Small  / Bili: Negative / Urobili: Negative   Blood: x / Protein: 30 mg/dL / Nitrite: Positive   Leuk Esterase: Moderate / RBC: 3-5 /HPF / WBC 26-50   Sq Epi: x / Non Sq Epi: Occasional / Bacteria: TNTC      CULTURES:      Physical Examination:    General: No acute distress.  elderly male lying uncomfortably in bed    HEENT: Pupils equal, reactive to light.  Symmetric.    PULM: few rhonchi bilat good excursion no change percussion    CVS: Regular rate and rhythm, no murmurs, rubs, or gallops    ABD: Soft, nondistended, nontender, normoactive bowel sounds, no masses    EXT: No edema, tender left hip/ bandaged    SKIN: Warm and well perfused, no rashes noted.    NEURO: Alert, moves all ext, confused, interactive, nonfocal    RADIOLOGY: ***< from: CT Chest w/ IV Cont (21 @ 21:33) >  EXAM:  CT ABDOMEN AND PELVIS IC                          EXAM:  CT CHEST IC                            PROCEDURE DATE:  2021          INTERPRETATION:  CLINICAL INFORMATION: Abdominal trauma    COMPARISON: Comparison to numerous prior examinations most recent on 2021    CONTRAST/COMPLICATIONS:  IV Contrast: Omnipaque 350 (accession [id number]), IV contrast documented in associated exam (accession [id number])  90 cc administered (accession [id number]), 0 cc administered (accession [id number]) 10 cc discarded (accession [id number]), 0 cc discarded (accession [id number])  Oral Contrast: NONE  Complications: None reported at time of study completion    PROCEDURE:  CT of the Chest, Abdomen and Pelvis was performed.  Sagittal and coronal reformats were performed.    FINDINGS:  CHEST:  LUNGS AND LARGE AIRWAYS: Bronchial thickening the bronchus intermedius extending into the right lower lobe bronchus. Secretions identified at the lower trachea and left mainstem bronchus. Centrilobular emphysema. Mucoid impacted airways in the right lower lobe. Dependent subsegmental atelectasis in both lobes.  PLEURA: No pleural effusion.  VESSELS: Atherosclerotic changes of the aorta and coronary arteries. Aberrant left subclavian artery.  HEART: Heart size is normal. No pericardial effusion.  MEDIASTINUM AND EMMA: No lymphadenopathy.  CHEST WALL AND LOWER NECK: Within normal limits.    ABDOMEN AND PELVIS:  LIVER: Multiple simple hepatic cysts largest measuring 5.7 x 5.2 cm in size. Heterogeneous hypoattenuating lesion is again demonstrated at the inferior aspect of segment VI measuring 2.3 x 1.9 cm, stable from prior examination likely representing hemangioma.  BILE DUCTS: Normal caliber.  GALLBLADDER: Within normal limits.  SPLEEN: Within normal limits.  PANCREAS: Within normal limits.  ADRENALS: Within normal limits.  KIDNEYS/URETERS: Symmetrical enhancement without hydronephrosis. 6 mm nonobstructing calculus in the lower pole of the left kidney. 6 mm nonobstructing calculus in the midpole of the left kidney.    BLADDER: Diffuse wall thickening of the of the urinary bladder most prominent at the posterior wall, and numerous bladder diverticulum again demonstrated. Small calcification at the posterior left bladder wall again demonstrated.  REPRODUCTIVE ORGANS: Fiducial markers versus surgical anchors identified within the prostate.    BOWEL: No bowel obstruction. Appendix is not visualized. No evidence of inflammation in the pericecal region.There is diverticulosis without evidence of diverticulitis.  PERITONEUM: No ascites.  VESSELS: Atherosclerotic changes.  RETROPERITONEUM/LYMPH NODES: No lymphadenopathy.  ABDOMINAL WALL: Within normal limits.  BONES: Comminuted intertrochanteric fracture of the proximal left femur. Degenerative changes and severe levoscoliosis.    IMPRESSION:    Comminuted intertrochanteric fracture of the proximal left femur.    Mucoid impacted airways with chronic aspiration in the right lower lobe.    Other chronic ancillary findings as above without change from prior exams.        --- End of Report ---            KENY FARLEY   This document has been electronically signed. Aug 22 2021 10:22PM    < end of copied text >    CXR bas atelectasis

## 2021-08-27 NOTE — PROGRESS NOTE ADULT - SUBJECTIVE AND OBJECTIVE BOX
Physical Medicine and Rehabilitation Subsequent Evaluation    Patient seen in followup for functional deficits, interval assessment of pain, progression with PT    Patient seen and examined at bedside. Interval history significant for increased pulmonary infiltrate, being seen by ID/pulmonary medicine, continues on inhalers and chest PT. Patient also poorly progressing in PT. Most recent therapy notes reviewed, he is max assist for bed mobility, sit<>stand transfers, and ambulation. His pain is about 5/10 at this time representing an improvement overall. He is limited by cognition, pain to a slight extent, and weakness/poor balance.    Medical studies/laboratory studies reviewed, includin.1   7.30  )-----------( 171      ( 27 Aug 2021 06:50 )             32.4   08    141  |  105  |  35<H>  ----------------------------<  122<H>  4.4   |  27  |  1.30    Ca    8.3<L>      27 Aug 2021 06:50  Mg     2.1         ROS: Denies any new myalgia or arthralgia, denies numbness or tingling in peripheral limbs, denies fevers or chills.    PM, Social, Family Hx: unchanged from initial eval 2021    Physical Exam:   Vital Signs Last 24 Hrs  T(C): 36.8 (27 Aug 2021 13:00), Max: 37.1 (26 Aug 2021 19:31)  T(F): 98.3 (27 Aug 2021 13:00), Max: 98.7 (26 Aug 2021 19:31)  HR: 51 (27 Aug 2021 13:00) (51 - 74)  BP: 118/54 (27 Aug 2021 13:00) (106/65 - 149/77)  BP(mean): --  RR: 19 (27 Aug 2021 13:00) (17 - 19)  SpO2: 91% (27 Aug 2021 13:00) (90% - 92%)    Constitutional: Gen: In no acute distress, cooperative with exam and questioning   CV: Regular rate and rhythm, S1 S2, trace bilateral lower extremity pitting peripheral edema, pedal pulses intact  Resp: Good respiratory effort, positive rhonchi in bilateral upper airways, positive bilateral basilar crackles  Neuro: Cranial Nerves II-XII intact, sensation intact to light touch in peripheral upper and lower extremities  MSK: No cyanosis or clubbing of nails, strength 4- to 4/5 in bilateral upper and lower extremities RLE 4- to 4/5, LLE 3 to 4-/5 limited by pain and stiffness, ROM full in all extremities passively with exception of L hip not fully tested due to pain/swelling/stiffness  Psychiatric: awake, alert, oriented fully

## 2021-08-27 NOTE — PROGRESS NOTE ADULT - PROBLEM SELECTOR PROBLEM 10
BPH (benign prostatic hyperplasia)
BPH (benign prostatic hyperplasia)
Prophylactic measure
BPH (benign prostatic hyperplasia)
BPH (benign prostatic hyperplasia)
Prophylactic measure

## 2021-08-27 NOTE — PROGRESS NOTE ADULT - ASSESSMENT
86 year old male with PMH of dementia, essential tremor, COPD (not on home O2), severe scoliosis, HLD, hx of partial colon resection. He was BIBA s/p suspected mechanical fall and was found to have left proximal femur fracture, is being evaluated for Sinus shanti and surgical clearance    SVT  - Admission EKG shows sinus Shanti with HR of 48 with no ischemic changes.  - Episodes of SVT x 2 in setting of missed bb spontaneously broke.  No further noted.    - Continue home propanolol 80mg Po BID   - ECHO: a difficult study, unable to visualize any structure    s/p Mechanical Fall with left femoral Fx  - s/p intramedullary nailing of femur  - Pain control  - Care per Ortho  - Initiate incentive spirometer if able  -more hypoxic  -cont suppl o2  -at risk of decompensation    SARIKA  -as per primary   - Avoid nephrotoxics  - Okay with gentle hydration    DVT ppx  - Per Primary    Monitor and replete electrolytes. Keep K>4.0 and Mg>2.0.    Brandie Burkett FNP-C  Cardiology NP  [id number] [phone]

## 2021-08-27 NOTE — PROGRESS NOTE ADULT - PROBLEM SELECTOR PROBLEM 3
Fracture, proximal femur
Impaired mobility and ADLs
Acute UTI
Acute UTI
Fracture, proximal femur
Acute UTI
Acute UTI

## 2021-08-28 LAB
CULTURE RESULTS: SIGNIFICANT CHANGE UP
CULTURE RESULTS: SIGNIFICANT CHANGE UP
SPECIMEN SOURCE: SIGNIFICANT CHANGE UP
SPECIMEN SOURCE: SIGNIFICANT CHANGE UP

## 2021-09-07 LAB
BASE EXCESS BLDA CALC-SCNC: -1.1 MMOL/L — SIGNIFICANT CHANGE UP (ref -2–3)
BLOOD GAS COMMENTS ARTERIAL: SIGNIFICANT CHANGE UP
HCO3 BLDA-SCNC: 23 MMOL/L — SIGNIFICANT CHANGE UP (ref 21–28)
PCO2 BLDA: 36 MMHG — SIGNIFICANT CHANGE UP (ref 35–48)
PH BLDA: 7.42 — SIGNIFICANT CHANGE UP (ref 7.35–7.45)
PO2 BLDA: 54 MMHG — LOW (ref 83–108)
SAO2 % BLDA: 88 % — LOW (ref 94–98)

## 2021-10-14 RX ORDER — AZTREONAM 2 G
1 VIAL (EA) INJECTION
Qty: 0 | Refills: 0 | DISCHARGE
Start: 2021-10-14 | End: 2021-10-19

## 2021-10-16 ENCOUNTER — INPATIENT (INPATIENT)
Facility: HOSPITAL | Age: 86
LOS: 4 days | Discharge: NURSING, REHAB OR LONG-TERM CARE | DRG: 871 | End: 2021-10-21
Attending: FAMILY MEDICINE | Admitting: FAMILY MEDICINE
Payer: MEDICARE

## 2021-10-16 VITALS
DIASTOLIC BLOOD PRESSURE: 77 MMHG | HEART RATE: 102 BPM | HEIGHT: 70 IN | RESPIRATION RATE: 18 BRPM | SYSTOLIC BLOOD PRESSURE: 117 MMHG | WEIGHT: 164.91 LBS | OXYGEN SATURATION: 98 %

## 2021-10-16 DIAGNOSIS — J18.9 PNEUMONIA, UNSPECIFIED ORGANISM: ICD-10-CM

## 2021-10-16 DIAGNOSIS — Z90.49 ACQUIRED ABSENCE OF OTHER SPECIFIED PARTS OF DIGESTIVE TRACT: Chronic | ICD-10-CM

## 2021-10-16 PROBLEM — J44.9 CHRONIC OBSTRUCTIVE PULMONARY DISEASE, UNSPECIFIED: Chronic | Status: ACTIVE | Noted: 2021-08-22

## 2021-10-16 PROBLEM — M81.0 AGE-RELATED OSTEOPOROSIS WITHOUT CURRENT PATHOLOGICAL FRACTURE: Chronic | Status: ACTIVE | Noted: 2021-08-22

## 2021-10-16 PROBLEM — E78.5 HYPERLIPIDEMIA, UNSPECIFIED: Chronic | Status: ACTIVE | Noted: 2021-08-22

## 2021-10-16 PROBLEM — R25.1 TREMOR, UNSPECIFIED: Chronic | Status: ACTIVE | Noted: 2021-08-22

## 2021-10-16 LAB
ALBUMIN SERPL ELPH-MCNC: 2.6 G/DL — LOW (ref 3.3–5)
ALP SERPL-CCNC: 187 U/L — HIGH (ref 40–120)
ALT FLD-CCNC: 32 U/L — SIGNIFICANT CHANGE UP (ref 12–78)
ANION GAP SERPL CALC-SCNC: 4 MMOL/L — LOW (ref 5–17)
APPEARANCE UR: ABNORMAL
APTT BLD: 30.4 SEC — SIGNIFICANT CHANGE UP (ref 27.5–35.5)
AST SERPL-CCNC: 19 U/L — SIGNIFICANT CHANGE UP (ref 15–37)
BASOPHILS # BLD AUTO: 0.05 K/UL — SIGNIFICANT CHANGE UP (ref 0–0.2)
BASOPHILS NFR BLD AUTO: 0.3 % — SIGNIFICANT CHANGE UP (ref 0–2)
BILIRUB SERPL-MCNC: 0.3 MG/DL — SIGNIFICANT CHANGE UP (ref 0.2–1.2)
BILIRUB UR-MCNC: NEGATIVE — SIGNIFICANT CHANGE UP
BUN SERPL-MCNC: 20 MG/DL — SIGNIFICANT CHANGE UP (ref 7–23)
CALCIUM SERPL-MCNC: 8.8 MG/DL — SIGNIFICANT CHANGE UP (ref 8.5–10.1)
CHLORIDE SERPL-SCNC: 105 MMOL/L — SIGNIFICANT CHANGE UP (ref 96–108)
CO2 SERPL-SCNC: 30 MMOL/L — SIGNIFICANT CHANGE UP (ref 22–31)
COLOR SPEC: YELLOW — SIGNIFICANT CHANGE UP
CREAT SERPL-MCNC: 0.89 MG/DL — SIGNIFICANT CHANGE UP (ref 0.5–1.3)
DIFF PNL FLD: ABNORMAL
EOSINOPHIL # BLD AUTO: 0.09 K/UL — SIGNIFICANT CHANGE UP (ref 0–0.5)
EOSINOPHIL NFR BLD AUTO: 0.6 % — SIGNIFICANT CHANGE UP (ref 0–6)
GLUCOSE SERPL-MCNC: 102 MG/DL — HIGH (ref 70–99)
GLUCOSE UR QL: NEGATIVE MG/DL — SIGNIFICANT CHANGE UP
HCT VFR BLD CALC: 40.8 % — SIGNIFICANT CHANGE UP (ref 39–50)
HGB BLD-MCNC: 12.7 G/DL — LOW (ref 13–17)
IMM GRANULOCYTES NFR BLD AUTO: 1.1 % — SIGNIFICANT CHANGE UP (ref 0–1.5)
INR BLD: 1.01 RATIO — SIGNIFICANT CHANGE UP (ref 0.88–1.16)
KETONES UR-MCNC: NEGATIVE — SIGNIFICANT CHANGE UP
LACTATE SERPL-SCNC: 2.6 MMOL/L — HIGH (ref 0.7–2)
LEUKOCYTE ESTERASE UR-ACNC: ABNORMAL
LYMPHOCYTES # BLD AUTO: 0.75 K/UL — LOW (ref 1–3.3)
LYMPHOCYTES # BLD AUTO: 4.8 % — LOW (ref 13–44)
MCHC RBC-ENTMCNC: 30 PG — SIGNIFICANT CHANGE UP (ref 27–34)
MCHC RBC-ENTMCNC: 31.1 GM/DL — LOW (ref 32–36)
MCV RBC AUTO: 96.2 FL — SIGNIFICANT CHANGE UP (ref 80–100)
MONOCYTES # BLD AUTO: 0.93 K/UL — HIGH (ref 0–0.9)
MONOCYTES NFR BLD AUTO: 5.9 % — SIGNIFICANT CHANGE UP (ref 2–14)
NEUTROPHILS # BLD AUTO: 13.75 K/UL — HIGH (ref 1.8–7.4)
NEUTROPHILS NFR BLD AUTO: 87.3 % — HIGH (ref 43–77)
NITRITE UR-MCNC: POSITIVE
PH UR: 6.5 — SIGNIFICANT CHANGE UP (ref 5–8)
PLATELET # BLD AUTO: 264 K/UL — SIGNIFICANT CHANGE UP (ref 150–400)
POTASSIUM SERPL-MCNC: 4.5 MMOL/L — SIGNIFICANT CHANGE UP (ref 3.5–5.3)
POTASSIUM SERPL-SCNC: 4.5 MMOL/L — SIGNIFICANT CHANGE UP (ref 3.5–5.3)
PROT SERPL-MCNC: 7 GM/DL — SIGNIFICANT CHANGE UP (ref 6–8.3)
PROT UR-MCNC: 30 MG/DL
PROTHROM AB SERPL-ACNC: 11.7 SEC — SIGNIFICANT CHANGE UP (ref 10.6–13.6)
RAPID RVP RESULT: SIGNIFICANT CHANGE UP
RBC # BLD: 4.24 M/UL — SIGNIFICANT CHANGE UP (ref 4.2–5.8)
RBC # FLD: 14.6 % — HIGH (ref 10.3–14.5)
SARS-COV-2 RNA SPEC QL NAA+PROBE: SIGNIFICANT CHANGE UP
SODIUM SERPL-SCNC: 139 MMOL/L — SIGNIFICANT CHANGE UP (ref 135–145)
SP GR SPEC: 1.01 — SIGNIFICANT CHANGE UP (ref 1.01–1.02)
TROPONIN I, HIGH SENSITIVITY RESULT: 30.72 NG/L — SIGNIFICANT CHANGE UP
UROBILINOGEN FLD QL: NEGATIVE MG/DL — SIGNIFICANT CHANGE UP
WBC # BLD: 15.75 K/UL — HIGH (ref 3.8–10.5)
WBC # FLD AUTO: 15.75 K/UL — HIGH (ref 3.8–10.5)

## 2021-10-16 PROCEDURE — 70450 CT HEAD/BRAIN W/O DYE: CPT | Mod: 26,MA

## 2021-10-16 PROCEDURE — 93005 ELECTROCARDIOGRAM TRACING: CPT

## 2021-10-16 PROCEDURE — 71045 X-RAY EXAM CHEST 1 VIEW: CPT | Mod: 26

## 2021-10-16 PROCEDURE — 83880 ASSAY OF NATRIURETIC PEPTIDE: CPT

## 2021-10-16 PROCEDURE — 84484 ASSAY OF TROPONIN QUANT: CPT

## 2021-10-16 PROCEDURE — 80053 COMPREHEN METABOLIC PANEL: CPT

## 2021-10-16 PROCEDURE — 71045 X-RAY EXAM CHEST 1 VIEW: CPT

## 2021-10-16 PROCEDURE — 99285 EMERGENCY DEPT VISIT HI MDM: CPT | Mod: CS

## 2021-10-16 PROCEDURE — U0003: CPT

## 2021-10-16 PROCEDURE — 80048 BASIC METABOLIC PNL TOTAL CA: CPT

## 2021-10-16 PROCEDURE — 85027 COMPLETE CBC AUTOMATED: CPT

## 2021-10-16 PROCEDURE — 86769 SARS-COV-2 COVID-19 ANTIBODY: CPT

## 2021-10-16 PROCEDURE — 99223 1ST HOSP IP/OBS HIGH 75: CPT

## 2021-10-16 PROCEDURE — U0005: CPT

## 2021-10-16 PROCEDURE — 93308 TTE F-UP OR LMTD: CPT

## 2021-10-16 PROCEDURE — 83605 ASSAY OF LACTIC ACID: CPT

## 2021-10-16 PROCEDURE — 93010 ELECTROCARDIOGRAM REPORT: CPT

## 2021-10-16 PROCEDURE — 36415 COLL VENOUS BLD VENIPUNCTURE: CPT

## 2021-10-16 RX ORDER — PRIMIDONE 250 MG/1
50 TABLET ORAL
Refills: 0 | Status: DISCONTINUED | OUTPATIENT
Start: 2021-10-16 | End: 2021-10-18

## 2021-10-16 RX ORDER — ACETAMINOPHEN 500 MG
650 TABLET ORAL ONCE
Refills: 0 | Status: COMPLETED | OUTPATIENT
Start: 2021-10-16 | End: 2021-10-16

## 2021-10-16 RX ORDER — LANOLIN ALCOHOL/MO/W.PET/CERES
3 CREAM (GRAM) TOPICAL AT BEDTIME
Refills: 0 | Status: DISCONTINUED | OUTPATIENT
Start: 2021-10-16 | End: 2021-10-21

## 2021-10-16 RX ORDER — PROPRANOLOL HCL 160 MG
80 CAPSULE, EXTENDED RELEASE 24HR ORAL DAILY
Refills: 0 | Status: DISCONTINUED | OUTPATIENT
Start: 2021-10-16 | End: 2021-10-17

## 2021-10-16 RX ORDER — ATORVASTATIN CALCIUM 80 MG/1
10 TABLET, FILM COATED ORAL AT BEDTIME
Refills: 0 | Status: DISCONTINUED | OUTPATIENT
Start: 2021-10-16 | End: 2021-10-21

## 2021-10-16 RX ORDER — TAMSULOSIN HYDROCHLORIDE 0.4 MG/1
0.4 CAPSULE ORAL AT BEDTIME
Refills: 0 | Status: DISCONTINUED | OUTPATIENT
Start: 2021-10-16 | End: 2021-10-21

## 2021-10-16 RX ORDER — PANTOPRAZOLE SODIUM 20 MG/1
40 TABLET, DELAYED RELEASE ORAL
Refills: 0 | Status: DISCONTINUED | OUTPATIENT
Start: 2021-10-16 | End: 2021-10-21

## 2021-10-16 RX ORDER — ONDANSETRON 8 MG/1
4 TABLET, FILM COATED ORAL EVERY 8 HOURS
Refills: 0 | Status: DISCONTINUED | OUTPATIENT
Start: 2021-10-16 | End: 2021-10-21

## 2021-10-16 RX ORDER — VANCOMYCIN HCL 1 G
1000 VIAL (EA) INTRAVENOUS ONCE
Refills: 0 | Status: COMPLETED | OUTPATIENT
Start: 2021-10-16 | End: 2021-10-16

## 2021-10-16 RX ORDER — SODIUM CHLORIDE 9 MG/ML
1000 INJECTION INTRAMUSCULAR; INTRAVENOUS; SUBCUTANEOUS ONCE
Refills: 0 | Status: COMPLETED | OUTPATIENT
Start: 2021-10-16 | End: 2021-10-16

## 2021-10-16 RX ORDER — HEPARIN SODIUM 5000 [USP'U]/ML
5000 INJECTION INTRAVENOUS; SUBCUTANEOUS EVERY 12 HOURS
Refills: 0 | Status: DISCONTINUED | OUTPATIENT
Start: 2021-10-16 | End: 2021-10-21

## 2021-10-16 RX ORDER — CEFEPIME 1 G/1
1000 INJECTION, POWDER, FOR SOLUTION INTRAMUSCULAR; INTRAVENOUS ONCE
Refills: 0 | Status: COMPLETED | OUTPATIENT
Start: 2021-10-16 | End: 2021-10-16

## 2021-10-16 RX ORDER — CEFEPIME 1 G/1
1000 INJECTION, POWDER, FOR SOLUTION INTRAMUSCULAR; INTRAVENOUS ONCE
Refills: 0 | Status: DISCONTINUED | OUTPATIENT
Start: 2021-10-16 | End: 2021-10-16

## 2021-10-16 RX ORDER — SENNA PLUS 8.6 MG/1
2 TABLET ORAL AT BEDTIME
Refills: 0 | Status: DISCONTINUED | OUTPATIENT
Start: 2021-10-16 | End: 2021-10-21

## 2021-10-16 RX ORDER — ALBUTEROL 90 UG/1
2 AEROSOL, METERED ORAL EVERY 6 HOURS
Refills: 0 | Status: DISCONTINUED | OUTPATIENT
Start: 2021-10-16 | End: 2021-10-21

## 2021-10-16 RX ORDER — TIMOLOL 0.5 %
1 DROPS OPHTHALMIC (EYE) DAILY
Refills: 0 | Status: DISCONTINUED | OUTPATIENT
Start: 2021-10-16 | End: 2021-10-21

## 2021-10-16 RX ORDER — ACETAMINOPHEN 500 MG
650 TABLET ORAL EVERY 6 HOURS
Refills: 0 | Status: DISCONTINUED | OUTPATIENT
Start: 2021-10-16 | End: 2021-10-21

## 2021-10-16 RX ORDER — CHOLECALCIFEROL (VITAMIN D3) 125 MCG
1 CAPSULE ORAL
Qty: 0 | Refills: 0 | DISCHARGE

## 2021-10-16 RX ORDER — PROPRANOLOL HCL 160 MG
1 CAPSULE, EXTENDED RELEASE 24HR ORAL
Qty: 0 | Refills: 0 | DISCHARGE

## 2021-10-16 RX ORDER — DONEPEZIL HYDROCHLORIDE 10 MG/1
5 TABLET, FILM COATED ORAL AT BEDTIME
Refills: 0 | Status: DISCONTINUED | OUTPATIENT
Start: 2021-10-16 | End: 2021-10-21

## 2021-10-16 RX ORDER — TOPIRAMATE 25 MG
25 TABLET ORAL
Refills: 0 | Status: DISCONTINUED | OUTPATIENT
Start: 2021-10-16 | End: 2021-10-18

## 2021-10-16 RX ORDER — MAGNESIUM HYDROXIDE 400 MG/1
30 TABLET, CHEWABLE ORAL DAILY
Refills: 0 | Status: DISCONTINUED | OUTPATIENT
Start: 2021-10-16 | End: 2021-10-21

## 2021-10-16 RX ORDER — LATANOPROST 0.05 MG/ML
1 SOLUTION/ DROPS OPHTHALMIC; TOPICAL AT BEDTIME
Refills: 0 | Status: DISCONTINUED | OUTPATIENT
Start: 2021-10-16 | End: 2021-10-21

## 2021-10-16 RX ADMIN — PRIMIDONE 50 MILLIGRAM(S): 250 TABLET ORAL at 21:53

## 2021-10-16 RX ADMIN — ATORVASTATIN CALCIUM 10 MILLIGRAM(S): 80 TABLET, FILM COATED ORAL at 21:53

## 2021-10-16 RX ADMIN — LATANOPROST 1 DROP(S): 0.05 SOLUTION/ DROPS OPHTHALMIC; TOPICAL at 21:55

## 2021-10-16 RX ADMIN — Medication 250 MILLIGRAM(S): at 14:18

## 2021-10-16 RX ADMIN — Medication 3 MILLIGRAM(S): at 21:53

## 2021-10-16 RX ADMIN — SENNA PLUS 2 TABLET(S): 8.6 TABLET ORAL at 21:53

## 2021-10-16 RX ADMIN — HEPARIN SODIUM 5000 UNIT(S): 5000 INJECTION INTRAVENOUS; SUBCUTANEOUS at 18:44

## 2021-10-16 RX ADMIN — DONEPEZIL HYDROCHLORIDE 5 MILLIGRAM(S): 10 TABLET, FILM COATED ORAL at 21:53

## 2021-10-16 RX ADMIN — TAMSULOSIN HYDROCHLORIDE 0.4 MILLIGRAM(S): 0.4 CAPSULE ORAL at 21:53

## 2021-10-16 RX ADMIN — CEFEPIME 1000 MILLIGRAM(S): 1 INJECTION, POWDER, FOR SOLUTION INTRAMUSCULAR; INTRAVENOUS at 14:17

## 2021-10-16 RX ADMIN — Medication 650 MILLIGRAM(S): at 14:13

## 2021-10-16 RX ADMIN — SODIUM CHLORIDE 1000 MILLILITER(S): 9 INJECTION INTRAMUSCULAR; INTRAVENOUS; SUBCUTANEOUS at 14:13

## 2021-10-16 RX ADMIN — Medication 25 MILLIGRAM(S): at 21:52

## 2021-10-16 RX ADMIN — Medication 650 MILLIGRAM(S): at 12:44

## 2021-10-16 RX ADMIN — SODIUM CHLORIDE 1000 MILLILITER(S): 9 INJECTION INTRAMUSCULAR; INTRAVENOUS; SUBCUTANEOUS at 15:19

## 2021-10-16 RX ADMIN — SODIUM CHLORIDE 1000 MILLILITER(S): 9 INJECTION INTRAMUSCULAR; INTRAVENOUS; SUBCUTANEOUS at 12:52

## 2021-10-16 NOTE — H&P ADULT - ASSESSMENT
87 yo male with Hx. of COPD, Dementia, Tremor , Bradycardia, Scoliosis, osteoporosis ,Left femur fracture 8/23/21 underwent IMN , bradycardia,  was sent to the ER from [facility] after he fell in the facility. Denied hitting head but was c/o chest pain. In the her he was  Tachycardic initially but later his HR bradycardic to 43.  assessment Dx:                       1. Chest pain                        2. Bradycardia                       3. UTI                        4. R foot big toe wound    Plan:    admit to Telemetry               medications: as per med rec. Started on Cefepime and Vancomycin in the ER                VTEP: Heparin               Labs: cbc,bmp , Troponins               Radiology: CXRay               Cardiac diagnostics: EKG               Consults: Cardiology, ID.

## 2021-10-16 NOTE — ED PROVIDER NOTE - OBJECTIVE STATEMENT
85 y/o male with a PMHx of chronic cough, COPD, HLD, memory loss, occasional tremor, osteoporosis, scoliosis presents to the ED BIBA from [facility] s/p fall. Pt reports he was going to the bathroom, slipped and fell. No head trauma. No LOC. Pt had CP after, now resolved. Denies hitting his chest, hip pain. On Xarelto. Pt DNR/DNI. No other complaints at this time.

## 2021-10-16 NOTE — H&P ADULT - HISTORY OF PRESENT ILLNESS
HPI: The patient is an 87 yo male with Hx. of COPD, Dementia, Tremor , Bradycardia, Scoliosis, osteoporosis ,Left femur fracture 8/23/21 underwent IMN , bradycardia,  was sent to the ER from [facility] after he fell in the facility. Denied hitting head but was c/o chest pain.     PMHx: COPD, Dementia, Tremor , Bradycardia, Scoliosis, osteoporosis    PSHx: Left femur fracture 8/23/21 underwent IMN , partial colectomy    Family Hx: not obtainable secondary to dementia    Social Hx.: not smoking, no alcohol use    ROS:  as in HPI   Eyes: no changes in vision    ENT/Mouth: no changes    Cardiovascular: no longer has CP     Respiratory: no SOB    Gastrointestinal: no diarrhea, no nausea, no vomiting    Genitourinary: no dysuria    Breast: no pain    Musculoskeletal: no pain    Integumentary: no itching    Neurological: No Headache, no tremor,    Psychiatric: no suicidal ideations    Endocrine: no excessive thirst,     Hematologic/Lymphatic: no swollen glands    Allergic/Immunologic: no itching      Physical Exam: Vital Signs Last 24 Hrs  T(C): 36.7 (16 Oct 2021 20:37), Max: 36.8 (16 Oct 2021 18:54)  T(F): 98 (16 Oct 2021 20:37), Max: 98.3 (16 Oct 2021 18:54)  HR: 66 (16 Oct 2021 20:37) (50 - 102)  BP: 140/53 (16 Oct 2021 20:37) (117/77 - 154/67)  BP(mean): 93 (16 Oct 2021 20:08) (77 - 93)  RR: 17 (16 Oct 2021 20:37) (16 - 19)  SpO2: 99% (16 Oct 2021 20:37) (91% - 99%)        HEENT: PRRL EOMI    MOUTH/TEETH/GUMS: Clear    NECK: no JVD    LUNGS: Clear    HEART: S1,S2 RR    ABDOMEN: soft nontender    EXTREMITIES:  no pedal edema, open wound on tip of R big toe    MUSCULOSKELETAL: arthritic changes     NEURO: no tremor, no focal signs.    SKIN: no rash, open wound on tip of R big toe.    : CVA negative,     Lab:                       12.7   15.75 )-----------( 264      ( 16 Oct 2021 11:46 )             40.8       10-16    139  |  105  |  20  ----------------------------<  102<H>  4.5   |  30  |  0.89    Ca    8.8      16 Oct 2021 11:46    TPro  7.0  /  Alb  2.6<L>  /  TBili  0.3  /  DBili  x   /  AST  19  /  ALT  32  /  AlkPhos  187<H>  10-16    CTH neg , UA wbc >50, Nitrates positive.

## 2021-10-16 NOTE — ED ADULT TRIAGE NOTE - CHIEF COMPLAINT QUOTE
Pt. to the ED C/O Chest Pain- EMS states patient has fall this morning and denies any physical trauma- Pt. reports feeling Chest Pain- + Cardiac hx and COPD

## 2021-10-16 NOTE — CHART NOTE - NSCHARTNOTEFT_GEN_A_CORE
Chart reviewed. Spoke with spouse via phone. Patient has dementia. Spouse would like to be kept up to date with plan of care. Home- [phone], Cell [phone]. Chart reviewed. Spoke with spouse via phone. Patient has dementia. Spouse would like to be kept up to date with plan of care. Home- [phone], Cell [phone]. Patient's PMD is Evelyn Bell.

## 2021-10-16 NOTE — ED ADULT NURSE NOTE - OBJECTIVE STATEMENT
BIBA for evaluation of CP from [facility]. Pt states he was ambulating to restoom without assistance and fell in bathroom. Denies head injury or LOC. Pt c/o R foot pain, stage 1 heal and big toe noted.

## 2021-10-16 NOTE — ED ADULT NURSE REASSESSMENT NOTE - NS ED NURSE REASSESS COMMENT FT1
received pt from SIL[facility]. pt is a&0x2, airway patent and does not show any s/s of respiratory distress. pt v/s are WNL, comes from [facility] and has a DNR/DNI MOLS form in place. pt has no complaints at this time, he is eating and awaiting admission to the floor

## 2021-10-16 NOTE — ED PROVIDER NOTE - CONSTITUTIONAL, MLM
normal... Thin, chronically ill appearing, awake, alert, oriented to person, place, time/situation and in no apparent distress.

## 2021-10-16 NOTE — ED PROVIDER NOTE - CLINICAL SUMMARY MEDICAL DECISION MAKING FREE TEXT BOX
Pt recently hospitalized now with CP and low O2. Plan: cardiac workup, sepsis, CT head given fall on Xarelto, likely admit to hospital.

## 2021-10-17 LAB
COVID-19 SPIKE DOMAIN AB INTERP: POSITIVE
COVID-19 SPIKE DOMAIN ANTIBODY RESULT: 108 U/ML — HIGH
SARS-COV-2 IGG+IGM SERPL QL IA: 108 U/ML — HIGH
SARS-COV-2 IGG+IGM SERPL QL IA: POSITIVE
TROPONIN I, HIGH SENSITIVITY RESULT: 27.11 NG/L — SIGNIFICANT CHANGE UP

## 2021-10-17 PROCEDURE — 99233 SBSQ HOSP IP/OBS HIGH 50: CPT

## 2021-10-17 RX ORDER — VANCOMYCIN HCL 1 G
1000 VIAL (EA) INTRAVENOUS EVERY 12 HOURS
Refills: 0 | Status: DISCONTINUED | OUTPATIENT
Start: 2021-10-17 | End: 2021-10-17

## 2021-10-17 RX ORDER — CEFEPIME 1 G/1
1000 INJECTION, POWDER, FOR SOLUTION INTRAMUSCULAR; INTRAVENOUS EVERY 12 HOURS
Refills: 0 | Status: DISCONTINUED | OUTPATIENT
Start: 2021-10-17 | End: 2021-10-21

## 2021-10-17 RX ORDER — CEFEPIME 1 G/1
1000 INJECTION, POWDER, FOR SOLUTION INTRAMUSCULAR; INTRAVENOUS EVERY 12 HOURS
Refills: 0 | Status: DISCONTINUED | OUTPATIENT
Start: 2021-10-17 | End: 2021-10-17

## 2021-10-17 RX ADMIN — HEPARIN SODIUM 5000 UNIT(S): 5000 INJECTION INTRAVENOUS; SUBCUTANEOUS at 17:07

## 2021-10-17 RX ADMIN — ATORVASTATIN CALCIUM 10 MILLIGRAM(S): 80 TABLET, FILM COATED ORAL at 21:22

## 2021-10-17 RX ADMIN — DONEPEZIL HYDROCHLORIDE 5 MILLIGRAM(S): 10 TABLET, FILM COATED ORAL at 21:25

## 2021-10-17 RX ADMIN — LATANOPROST 1 DROP(S): 0.05 SOLUTION/ DROPS OPHTHALMIC; TOPICAL at 21:23

## 2021-10-17 RX ADMIN — Medication 3 MILLIGRAM(S): at 21:22

## 2021-10-17 RX ADMIN — PRIMIDONE 50 MILLIGRAM(S): 250 TABLET ORAL at 11:01

## 2021-10-17 RX ADMIN — CEFEPIME 1000 MILLIGRAM(S): 1 INJECTION, POWDER, FOR SOLUTION INTRAMUSCULAR; INTRAVENOUS at 21:22

## 2021-10-17 RX ADMIN — Medication 25 MILLIGRAM(S): at 21:22

## 2021-10-17 RX ADMIN — Medication 25 MILLIGRAM(S): at 11:01

## 2021-10-17 RX ADMIN — HEPARIN SODIUM 5000 UNIT(S): 5000 INJECTION INTRAVENOUS; SUBCUTANEOUS at 05:09

## 2021-10-17 RX ADMIN — Medication 100 MILLIGRAM(S): at 21:22

## 2021-10-17 RX ADMIN — Medication 80 MILLIGRAM(S): at 11:01

## 2021-10-17 RX ADMIN — SENNA PLUS 2 TABLET(S): 8.6 TABLET ORAL at 21:23

## 2021-10-17 RX ADMIN — PANTOPRAZOLE SODIUM 40 MILLIGRAM(S): 20 TABLET, DELAYED RELEASE ORAL at 05:08

## 2021-10-17 RX ADMIN — PRIMIDONE 50 MILLIGRAM(S): 250 TABLET ORAL at 21:22

## 2021-10-17 RX ADMIN — TAMSULOSIN HYDROCHLORIDE 0.4 MILLIGRAM(S): 0.4 CAPSULE ORAL at 21:25

## 2021-10-17 RX ADMIN — Medication 1 DROP(S): at 11:02

## 2021-10-17 NOTE — PROGRESS NOTE ADULT - ASSESSMENT
The patient is an 87 yo male with Hx. of COPD, Dementia, Tremor , Bradycardia, Scoliosis, osteoporosis ,Left femur fracture 8/23/21 underwent IMN , bradycardia,  was sent to the ER from [facility] after he fell in the facility.    1. Pneumonia    - Continue Vanco and cefepime. ID Consult    - Fu cultures, monitor for fever.  2. Sinus Bradycardia   - Episode of tachy/shanti in ER   - EP to eval, no evidence of ACS.   - Monitor Troponin   - Decrease propanolol to 60 mg  3. SP Fall   - no obvious trauma at this point   - Monitor for pain  4. HLD   - Continue Atorvastatin  5. DVT prophylaxis   - Continue Heparin    The patient is an 85 yo male with Hx. of COPD, Dementia, Tremor , Bradycardia, Scoliosis, osteoporosis ,Left femur fracture 8/23/21 underwent IMN , bradycardia,  was sent to the ER from [facility] after he fell in the facility.    1. Pneumonia    - Continue Vanco and cefepime. ID Consult    - Fu cultures, monitor for fever.  2. Sinus Bradycardia   - Episode of tachy/shanti in ER   - EP to eval, no evidence of ACS.   - Monitor Troponin   - Decrease propanolol to 40 mg  3. SP Fall   - no obvious trauma at this point   - Monitor for pain  4. HLD   - Continue Atorvastatin  5. DVT prophylaxis   - Continue Heparin

## 2021-10-17 NOTE — PROGRESS NOTE ADULT - SUBJECTIVE AND OBJECTIVE BOX
Patient seen and examined:  Patient with dementia, answering some  questions appropriately. No acute events  overnight. Tolerating po and voiding well.  No fever or cough.    ROS: Negative except for above    Vital Signs Last 24 Hrs  T(C): 36.4 (17 Oct 2021 09:05), Max: 36.8 (16 Oct 2021 18:54)  T(F): 97.6 (17 Oct 2021 09:05), Max: 98.3 (16 Oct 2021 18:54)  HR: 58 (17 Oct 2021 09:05) (50 - 84)  BP: 119/46 (17 Oct 2021 09:05) (119/46 - 154/67)  BP(mean): 93 (16 Oct 2021 20:08) (77 - 93)  RR: 16 (17 Oct 2021 09:05) (16 - 18)  SpO2: 97% (17 Oct 2021 09:05) (97% - 99%)    PHYSICAL EXAM:  Constitutional: NAD, awake and alert, thin  HEENT: PERR, EOMI, Normal Hearing, MMM  Neck: Soft and supple, No LAD, No JVD  Respiratory: Breath sounds are clear bilaterally, No wheezing, rales or rhonchi  Cardiovascular: S1 and S2, regular rate and rhythm, no Murmurs, gallops or rubs  Gastrointestinal: Bowel Sounds present, soft, nontender, nondistended, no guarding, no rebound  Extremities: No peripheral edema  Vascular: 2+ peripheral pulses  Neurological: A/O x 3, no focal deficits  Musculoskeletal: 5/5 strength b/l upper and lower extremities  Skin: No rashes      LABS: All Labs Reviewed:                        12.7   15.75 )-----------( 264      ( 16 Oct 2021 11:46 )             40.8     10-16    139  |  105  |  20  ----------------------------<  102<H>  4.5   |  30  |  0.89    Ca    8.8      16 Oct 2021 11:46    TPro  7.0  /  Alb  2.6<L>  /  TBili  0.3  /  DBili  x   /  AST  19  /  ALT  32  /  AlkPhos  187<H>  10-16    PT/INR - ( 16 Oct 2021 11:46 )   PT: 11.7 sec;   INR: 1.01 ratio         PTT - ( 16 Oct 2021 11:46 )  PTT:30.4 sec    < from: Xray Chest 1 View- PORTABLE-Urgent (10.16.21 @ 12:56) >  IMPRESSION: There is bilateral lower lobe linear and patchy opacification is more prominent on the left compatible with atelectasis or pneumonia. The heart is normal in size. Degenerative changes are noted of the bones.    < end of copied text >

## 2021-10-17 NOTE — CONSULT NOTE ADULT - ASSESSMENT
The patient is an 87 yo male with Hx. of COPD, Dementia, Tremor , Bradycardia, Scoliosis, osteoporosis ,Left femur fracture 8/23/21 underwent IMN , bradycardia,  admitted from snf on 10/16 for evaluation after a fall; patient cannot provide any details and history per medical record.     1. Patient admitted with pneumonia, also noted with leukocytosis which most likely is reactive to infection; patient at risk for gram negative rods and other resistant bacteria   - follow up cultures   - serial cbc and monitor temperature   - reviewed prior medical records to evaluate for resistant or atypical pathogens   - oxygen and nebs as needed   - iv hydration and supportive care   - will continue cefepime as ordered  - hold on further vancomycin given risk of nephrotoxicity  - will add doxycycline to cover atypical and resistant pathogens  2. other issues; per medicine

## 2021-10-18 LAB
-  AMIKACIN: SIGNIFICANT CHANGE UP
-  AMOXICILLIN/CLAVULANIC ACID: SIGNIFICANT CHANGE UP
-  AMPICILLIN/SULBACTAM: SIGNIFICANT CHANGE UP
-  AMPICILLIN: SIGNIFICANT CHANGE UP
-  AZTREONAM: SIGNIFICANT CHANGE UP
-  CEFAZOLIN: SIGNIFICANT CHANGE UP
-  CEFEPIME: SIGNIFICANT CHANGE UP
-  CEFOXITIN: SIGNIFICANT CHANGE UP
-  CEFTRIAXONE: SIGNIFICANT CHANGE UP
-  CIPROFLOXACIN: SIGNIFICANT CHANGE UP
-  ERTAPENEM: SIGNIFICANT CHANGE UP
-  GENTAMICIN: SIGNIFICANT CHANGE UP
-  IMIPENEM: SIGNIFICANT CHANGE UP
-  LEVOFLOXACIN: SIGNIFICANT CHANGE UP
-  MEROPENEM: SIGNIFICANT CHANGE UP
-  NITROFURANTOIN: SIGNIFICANT CHANGE UP
-  PIPERACILLIN/TAZOBACTAM: SIGNIFICANT CHANGE UP
-  TIGECYCLINE: SIGNIFICANT CHANGE UP
-  TOBRAMYCIN: SIGNIFICANT CHANGE UP
-  TRIMETHOPRIM/SULFAMETHOXAZOLE: SIGNIFICANT CHANGE UP
ANION GAP SERPL CALC-SCNC: 4 MMOL/L — LOW (ref 5–17)
BUN SERPL-MCNC: 19 MG/DL — SIGNIFICANT CHANGE UP (ref 7–23)
CALCIUM SERPL-MCNC: 8.4 MG/DL — LOW (ref 8.5–10.1)
CHLORIDE SERPL-SCNC: 106 MMOL/L — SIGNIFICANT CHANGE UP (ref 96–108)
CO2 SERPL-SCNC: 27 MMOL/L — SIGNIFICANT CHANGE UP (ref 22–31)
CREAT SERPL-MCNC: 0.69 MG/DL — SIGNIFICANT CHANGE UP (ref 0.5–1.3)
CULTURE RESULTS: SIGNIFICANT CHANGE UP
GLUCOSE SERPL-MCNC: 89 MG/DL — SIGNIFICANT CHANGE UP (ref 70–99)
HCT VFR BLD CALC: 33.1 % — LOW (ref 39–50)
HGB BLD-MCNC: 10.5 G/DL — LOW (ref 13–17)
MCHC RBC-ENTMCNC: 30.5 PG — SIGNIFICANT CHANGE UP (ref 27–34)
MCHC RBC-ENTMCNC: 31.7 GM/DL — LOW (ref 32–36)
MCV RBC AUTO: 96.2 FL — SIGNIFICANT CHANGE UP (ref 80–100)
METHOD TYPE: SIGNIFICANT CHANGE UP
ORGANISM # SPEC MICROSCOPIC CNT: SIGNIFICANT CHANGE UP
ORGANISM # SPEC MICROSCOPIC CNT: SIGNIFICANT CHANGE UP
PLATELET # BLD AUTO: 211 K/UL — SIGNIFICANT CHANGE UP (ref 150–400)
POTASSIUM SERPL-MCNC: 4 MMOL/L — SIGNIFICANT CHANGE UP (ref 3.5–5.3)
POTASSIUM SERPL-SCNC: 4 MMOL/L — SIGNIFICANT CHANGE UP (ref 3.5–5.3)
RBC # BLD: 3.44 M/UL — LOW (ref 4.2–5.8)
RBC # FLD: 14.5 % — SIGNIFICANT CHANGE UP (ref 10.3–14.5)
SODIUM SERPL-SCNC: 137 MMOL/L — SIGNIFICANT CHANGE UP (ref 135–145)
SPECIMEN SOURCE: SIGNIFICANT CHANGE UP
WBC # BLD: 7.68 K/UL — SIGNIFICANT CHANGE UP (ref 3.8–10.5)
WBC # FLD AUTO: 7.68 K/UL — SIGNIFICANT CHANGE UP (ref 3.8–10.5)

## 2021-10-18 PROCEDURE — 99223 1ST HOSP IP/OBS HIGH 75: CPT

## 2021-10-18 PROCEDURE — 99233 SBSQ HOSP IP/OBS HIGH 50: CPT

## 2021-10-18 RX ORDER — PRIMIDONE 250 MG/1
100 TABLET ORAL
Refills: 0 | Status: DISCONTINUED | OUTPATIENT
Start: 2021-10-18 | End: 2021-10-21

## 2021-10-18 RX ORDER — TOPIRAMATE 25 MG
50 TABLET ORAL
Refills: 0 | Status: DISCONTINUED | OUTPATIENT
Start: 2021-10-18 | End: 2021-10-21

## 2021-10-18 RX ADMIN — LATANOPROST 1 DROP(S): 0.05 SOLUTION/ DROPS OPHTHALMIC; TOPICAL at 21:12

## 2021-10-18 RX ADMIN — PANTOPRAZOLE SODIUM 40 MILLIGRAM(S): 20 TABLET, DELAYED RELEASE ORAL at 05:10

## 2021-10-18 RX ADMIN — SENNA PLUS 2 TABLET(S): 8.6 TABLET ORAL at 21:11

## 2021-10-18 RX ADMIN — HEPARIN SODIUM 5000 UNIT(S): 5000 INJECTION INTRAVENOUS; SUBCUTANEOUS at 19:00

## 2021-10-18 RX ADMIN — TAMSULOSIN HYDROCHLORIDE 0.4 MILLIGRAM(S): 0.4 CAPSULE ORAL at 21:10

## 2021-10-18 RX ADMIN — PRIMIDONE 50 MILLIGRAM(S): 250 TABLET ORAL at 10:27

## 2021-10-18 RX ADMIN — Medication 100 MILLIGRAM(S): at 10:27

## 2021-10-18 RX ADMIN — HEPARIN SODIUM 5000 UNIT(S): 5000 INJECTION INTRAVENOUS; SUBCUTANEOUS at 05:10

## 2021-10-18 RX ADMIN — CEFEPIME 1000 MILLIGRAM(S): 1 INJECTION, POWDER, FOR SOLUTION INTRAMUSCULAR; INTRAVENOUS at 10:26

## 2021-10-18 RX ADMIN — PRIMIDONE 100 MILLIGRAM(S): 250 TABLET ORAL at 21:10

## 2021-10-18 RX ADMIN — ATORVASTATIN CALCIUM 10 MILLIGRAM(S): 80 TABLET, FILM COATED ORAL at 21:10

## 2021-10-18 RX ADMIN — Medication 100 MILLIGRAM(S): at 21:10

## 2021-10-18 RX ADMIN — Medication 3 MILLIGRAM(S): at 21:11

## 2021-10-18 RX ADMIN — Medication 50 MILLIGRAM(S): at 21:10

## 2021-10-18 RX ADMIN — DONEPEZIL HYDROCHLORIDE 5 MILLIGRAM(S): 10 TABLET, FILM COATED ORAL at 21:10

## 2021-10-18 RX ADMIN — Medication 25 MILLIGRAM(S): at 10:27

## 2021-10-18 RX ADMIN — Medication 1 DROP(S): at 18:59

## 2021-10-18 RX ADMIN — CEFEPIME 1000 MILLIGRAM(S): 1 INJECTION, POWDER, FOR SOLUTION INTRAMUSCULAR; INTRAVENOUS at 21:11

## 2021-10-18 NOTE — PROGRESS NOTE ADULT - SUBJECTIVE AND OBJECTIVE BOX
Date of service: 10-18-21 @ 13:55    Patient lying in bed; still on oxygen        ROS unable to obtain secondary to patient medical condition     MEDICATIONS  (STANDING):  atorvastatin 10 milliGRAM(s) Oral at bedtime  cefepime  Injectable. 1000 milliGRAM(s) IV Push every 12 hours  donepezil 5 milliGRAM(s) Oral at bedtime  doxycycline hyclate Capsule 100 milliGRAM(s) Oral every 12 hours  heparin   Injectable 5000 Unit(s) SubCutaneous every 12 hours  latanoprost 0.005% Ophthalmic Solution 1 Drop(s) Both EYES at bedtime  pantoprazole    Tablet 40 milliGRAM(s) Oral before breakfast  primidone 50 milliGRAM(s) Oral two times a day  propranolol 40 milliGRAM(s) Oral daily  senna 2 Tablet(s) Oral at bedtime  tamsulosin 0.4 milliGRAM(s) Oral at bedtime  timolol 0.5% Solution 1 Drop(s) Both EYES daily  topiramate 25 milliGRAM(s) Oral two times a day    MEDICATIONS  (PRN):  acetaminophen   Tablet .. 650 milliGRAM(s) Oral every 6 hours PRN Temp greater or equal to 38C (100.4F), Mild Pain (1 - 3)  ALBUTerol    90 MICROgram(s) HFA Inhaler 2 Puff(s) Inhalation every 6 hours PRN Bronchospasm  aluminum hydroxide/magnesium hydroxide/simethicone Suspension 30 milliLiter(s) Oral every 4 hours PRN Dyspepsia  magnesium hydroxide Suspension 30 milliLiter(s) Oral daily PRN Constipation  melatonin 3 milliGRAM(s) Oral at bedtime PRN Insomnia  ondansetron Injectable 4 milliGRAM(s) IV Push every 8 hours PRN Nausea and/or Vomiting      Vital Signs Last 24 Hrs  T(C): 36.5 (18 Oct 2021 08:23), Max: 36.9 (17 Oct 2021 20:48)  T(F): 97.7 (18 Oct 2021 08:23), Max: 98.5 (17 Oct 2021 20:48)  HR: 52 (18 Oct 2021 08:23) (52 - 59)  BP: 130/55 (18 Oct 2021 08:23) (130/55 - 132/51)  BP(mean): --  RR: 18 (18 Oct 2021 08:23) (18 - 18)  SpO2: 100% (18 Oct 2021 08:23) (95% - 100%)        Physical Exam:        Constitutional: frail looking  HEENT: NC/AT, EOMI, PERRLA, conjunctivae clear; ears and nose atraumatic; pharynx clear  Neck: supple; thyroid not palpable  Back: no tenderness  Respiratory: respiratory effort normal; clear to auscultation  Cardiovascular: S1S2 regular, no murmurs  Abdomen: soft, not tender, not distended, positive BS; no liver or spleen organomegaly  Genitourinary: no suprapubic tenderness  Musculoskeletal: no muscle tenderness, no joint swelling or tenderness  Neurological/ Psychiatric: AxOx3, judgement and insight normal;  moving all extremities  Skin: no rashes; no palpable lesions    Labs: all available labs reviewed                       Labs:                        10.5   7.68  )-----------( 211      ( 18 Oct 2021 07:32 )             33.1     10-18    137  |  106  |  19  ----------------------------<  89  4.0   |  27  |  0.69    Ca    8.4<L>      18 Oct 2021 07:32             Cultures:       Culture - Urine (collected 10-16-21 @ 12:29)  Source: Clean Catch None  Preliminary Report (10-17-21 @ 19:23):    >100,000 CFU/ml Gram Negative Rods    Culture - Blood (collected 10-16-21 @ 11:46)  Source: .Blood None  Preliminary Report (10-17-21 @ 20:01):    No growth to date.    Culture - Blood (collected 10-16-21 @ 11:46)  Source: .Blood None  Preliminary Report (10-17-21 @ 20:01):    No growth to date.          < from: Xray Chest 1 View- PORTABLE-Urgent (10.16.21 @ 12:56) >    IMPRESSION: There is bilateral lower lobe linear and patchy opacification is more prominent on the left compatible with atelectasis or pneumonia. The heart is normal in size. Degenerative changes are noted of the bones.    < end of copied text >      Radiology: all available radiological tests reviewed    Advanced directives addressed: full resuscitation

## 2021-10-18 NOTE — PHARMACOTHERAPY INTERVENTION NOTE - COMMENTS
outpatient regimen continued
Medication history complete , reviewed with med list provided by [facility], confirmed with Luiz

## 2021-10-18 NOTE — PROGRESS NOTE ADULT - ASSESSMENT
The patient is an 87 yo male with Hx. of COPD, Dementia, Tremor , Bradycardia, Scoliosis, osteoporosis ,Left femur fracture 8/23/21 underwent IMN , bradycardia,  admitted from snf on 10/16 for evaluation after a fall; patient cannot provide any details and history per medical record.     1. Patient admitted with pneumonia, also noted with leukocytosis which most likely is reactive to infection; patient at risk for gram negative rods and other resistant bacteria   - follow up cultures   - serial cbc and monitor temperature   - reviewed prior medical records to evaluate for resistant or atypical pathogens   - oxygen and nebs as needed   - iv hydration and supportive care   - day # 2 cefepime and doxycycline  - tolerating antibiotics without rashes or side effects   - has gram negative rods in urine, await sensitivity; on cefepime  2. other issues; per medicine

## 2021-10-18 NOTE — CDI QUERY NOTE - NSCDIOTHERTXTBX3_GEN_ALL_CORE_FT
This patient is diagnosed with pneumonia and documentation reflects:    ED Provider Note 10/16/21 @ 11:16  RESPIRATORY: Rhonchi b/l.    Patient had a SpO2 of 91% on room air.  Placed on 4L N/C increasing to 96%.  Titrated down to 3L n/c.    Is the above clinical criteria indicative of a further diagnosis?  A) Acute hypoxic respiratory failure.  B) Other type of respiratory failure, please specify:  C) No clinical indication of respiratory failure.  D) Unable to determine.

## 2021-10-18 NOTE — CDI QUERY NOTE - NSCDIOTHERTXTBX_GEN_ALL_CORE_HH
This patient was recently hospitalized and came from a nursing home, diagnosed with pneumonia:    Progress Note Adult-Hospitalist Attending 10-17-21 @ 12:15  ,Left femur fracture 8/23/21 underwent IMN , bradycardia,  was sent to the ER from [facility] after he fell in the facility.    1. Pneumonia    - Continue Vanco and cefepime. ID Consult    Can the type of pneumonia requiring the use of IV vanco and cefepime, be specified?  A) Gram negative pneumonia.  B) Aspiration pneumonia.  C) Other type of pneumonia, please specify:  D) Unable to determine

## 2021-10-18 NOTE — PROGRESS NOTE ADULT - SUBJECTIVE AND OBJECTIVE BOX
Patient seen and examined:  With some baseline confusion but  answering questions appropriately.  No chest pain or sob. No fever  Voiding well. Tolerating po.    ROS: Negative except for above    Vital Signs Last 24 Hrs  T(C): 36.5 (18 Oct 2021 08:23), Max: 36.9 (17 Oct 2021 20:48)  T(F): 97.7 (18 Oct 2021 08:23), Max: 98.5 (17 Oct 2021 20:48)  HR: 52 (18 Oct 2021 08:23) (52 - 59)  BP: 130/55 (18 Oct 2021 08:23) (130/55 - 132/51)  BP(mean): --  RR: 18 (18 Oct 2021 08:23) (18 - 18)  SpO2: 100% (18 Oct 2021 08:23) (95% - 100%)      PHYSICAL EXAM:  Constitutional: NAD, awake and alert, thin  HEENT: PERR, EOMI, Normal Hearing, MMM  Neck: Soft and supple, No LAD, No JVD  Respiratory: Breath sounds are clear bilaterally, No wheezing, rales or rhonchi  Cardiovascular: S1 and S2, regular rate and rhythm, no Murmurs, gallops or rubs  Gastrointestinal: Bowel Sounds present, soft, nontender, nondistended, no guarding, no rebound  Extremities: No peripheral edema  Vascular: 2+ peripheral pulses  Neurological: A/O x 3, no focal deficits  Musculoskeletal: 5/5 strength b/l upper and lower extremities  Skin: No rashes    LABS: All Labs Reviewed:                        10.5   7.68  )-----------( 211      ( 18 Oct 2021 07:32 )             33.1     10-18    137  |  106  |  19  ----------------------------<  89  4.0   |  27  |  0.69    Ca    8.4<L>      18 Oct 2021 07:32

## 2021-10-18 NOTE — CONSULT NOTE ADULT - ASSESSMENT
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    -Patient currently in sinus bradycardia, asymptomatic on monitor, patient on propanolol 80mg ER qd at home for essential tremor  -TTE performed on last admission was difficult study and no structures or LVSF able to be evaluated  -fall possibly 2/2 to bradycardia, recommend DC BB if possible switch to alt therapy for ET, if not able to stop recommend lowest tolerated dose, last dose 80mg at 1100 this AM. Patient has chronotropic competence.   -cont telemetry monitoring while inpatient, will eval for possible micra placement pending telemetry monitoring, patient not currently in ADHF. 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    -Patient currently in sinus bradycardia, asymptomatic on monitor, patient on propanolol 80mg ER qd at home for essential tremor  -TTE performed on last admission was difficult study and no structures or LVSF able to be evaluated  -fall possibly 2/2 to bradycardia, recommend DC BB if possible switch to alt therapy for ET, if not able to stop recommend lowest tolerated dose, last dose 80mg at 1100 this AM. Patient has chronotropic competence.   -cont telemetry monitoring while inpatient, will eval for possible micra placement pending telemetry monitoring, patient not currently in ADHF. If possible would pursue repeat TTE to evaluate LVSF to guide decision on proper device if deemed necessary

## 2021-10-18 NOTE — PROGRESS NOTE ADULT - ASSESSMENT
The patient is an 85 yo male with Hx. of COPD, Dementia, Tremor , Bradycardia, Scoliosis, osteoporosis ,Left femur fracture 8/23/21 underwent IMN , bradycardia,  was sent to the ER from [facility] after he fell in the facility.    1. Pneumonia    - Continue doxycycline and cefepime. ID Consult appreciated    - Fu cultures, monitor for fever.  2. Sinus Bradycardia   - Episode of tachy/shanti in ER   - EP to eval, no evidence of ACS.   - Monitor Troponin- negative   - Decrease propanolol to 40 mg ( On Inderal for essential tremor)   3. SP Fall   - no obvious trauma at this point   - Monitor for pain  - ? Due to bradycardia  4. HLD   - Continue Atorvastatin  5. DVT prophylaxis   - Continue Heparin   The patient is an 85 yo male with Hx. of COPD, Dementia, Tremor , Bradycardia, Scoliosis, osteoporosis ,Left femur fracture 8/23/21 underwent IMN , bradycardia,  was sent to the ER from [facility] after he fell in the facility.    1. Pneumonia    - Continue doxycycline and cefepime. ID Consult appreciated    - Fu cultures, monitor for fever.  2. Sinus Bradycardia   - Episode of tachy/shanti in ER   - EP to eval, no evidence of ACS.   - Monitor Troponin- negative   - Decrease propanolol 20 mg ( Was on 80 BID for ET), still bradycardic  - Check Echo  - EP Consult appreciated   3. SP Fall   - no obvious trauma at this point   - Monitor for pain  - ? Due to bradycardia  4. HLD   - Continue Atorvastatin  5. DVT prophylaxis   - Continue Heparin  6. Essential Tremor  - continue Primidone, increase to 100 BID  - Increase Topamax 50 BID  - Reached out to his neurologist Dr. Zamora who is on vacation     The patient is an 87 yo male with Hx. of COPD, Dementia, Tremor , Bradycardia, Scoliosis, osteoporosis ,Left femur fracture 8/23/21 underwent IMN , bradycardia,  was sent to the ER from [facility] after he fell in the facility.    1. Acute hypoxic Respiratory Failure secondary to Pneumonia/Sepsis    - Continue doxycycline and cefepime. ID Consult appreciated    - Fu cultures, monitor for fever.  2. Sinus Bradycardia   - Episode of tachy/shanti in ER   - EP to eval, no evidence of ACS.   - Monitor Troponin- negative   - Decrease propanolol 20 mg ( Was on 80 BID for ET), still bradycardic  - Check Echo  - EP Consult appreciated   3. SP Fall   - no obvious trauma at this point   - Monitor for pain  - ? Due to bradycardia  4. HLD   - Continue Atorvastatin  5. DVT prophylaxis   - Continue Heparin  6. Essential Tremor  - continue Primidone, increase to 100 BID  - Increase Topamax 50 BID  - Reached out to his neurologist Dr. Zamora who is on vacation

## 2021-10-18 NOTE — CDI QUERY NOTE - NSCDIOTHERTXTBX2_GEN_ALL_CORE_FT
This patient seems to have met 2 SIRS criteria upon admission:    WBC Count: 15.75 K/uL (10.16.21 @ 11:46)  HR: up to 102    Source of infection: Pneumonia and UTI.    ED Provider Note 10-16-21 @ 11:16  Plan: cardiac workup, sepsis, CT head given fall on Xarelto, likely admit to hospital.  SEPSIS – was this patient treated for sepsis? Yes.     Is the above clinical criteria indicative of a further diagnosis?  A) Sepsis, present on admission.  B) Sepsis, developed after admission.  C) No clinical indication of sepsis.  D) Unable to determine.

## 2021-10-19 PROCEDURE — 99231 SBSQ HOSP IP/OBS SF/LOW 25: CPT

## 2021-10-19 PROCEDURE — 71045 X-RAY EXAM CHEST 1 VIEW: CPT | Mod: 26

## 2021-10-19 PROCEDURE — 99233 SBSQ HOSP IP/OBS HIGH 50: CPT

## 2021-10-19 PROCEDURE — 93308 TTE F-UP OR LMTD: CPT | Mod: 26

## 2021-10-19 RX ORDER — FUROSEMIDE 40 MG
40 TABLET ORAL ONCE
Refills: 0 | Status: COMPLETED | OUTPATIENT
Start: 2021-10-19 | End: 2021-10-19

## 2021-10-19 RX ADMIN — Medication 40 MILLIGRAM(S): at 18:11

## 2021-10-19 RX ADMIN — PRIMIDONE 100 MILLIGRAM(S): 250 TABLET ORAL at 21:12

## 2021-10-19 RX ADMIN — LATANOPROST 1 DROP(S): 0.05 SOLUTION/ DROPS OPHTHALMIC; TOPICAL at 21:13

## 2021-10-19 RX ADMIN — Medication 100 MILLIGRAM(S): at 11:16

## 2021-10-19 RX ADMIN — Medication 650 MILLIGRAM(S): at 19:53

## 2021-10-19 RX ADMIN — PRIMIDONE 100 MILLIGRAM(S): 250 TABLET ORAL at 11:16

## 2021-10-19 RX ADMIN — Medication 100 MILLIGRAM(S): at 21:13

## 2021-10-19 RX ADMIN — Medication 1 DROP(S): at 12:19

## 2021-10-19 RX ADMIN — CEFEPIME 1000 MILLIGRAM(S): 1 INJECTION, POWDER, FOR SOLUTION INTRAMUSCULAR; INTRAVENOUS at 11:15

## 2021-10-19 RX ADMIN — SENNA PLUS 2 TABLET(S): 8.6 TABLET ORAL at 21:13

## 2021-10-19 RX ADMIN — HEPARIN SODIUM 5000 UNIT(S): 5000 INJECTION INTRAVENOUS; SUBCUTANEOUS at 18:15

## 2021-10-19 RX ADMIN — PANTOPRAZOLE SODIUM 40 MILLIGRAM(S): 20 TABLET, DELAYED RELEASE ORAL at 06:08

## 2021-10-19 RX ADMIN — CEFEPIME 1000 MILLIGRAM(S): 1 INJECTION, POWDER, FOR SOLUTION INTRAMUSCULAR; INTRAVENOUS at 21:13

## 2021-10-19 RX ADMIN — Medication 50 MILLIGRAM(S): at 11:15

## 2021-10-19 RX ADMIN — DONEPEZIL HYDROCHLORIDE 5 MILLIGRAM(S): 10 TABLET, FILM COATED ORAL at 21:13

## 2021-10-19 RX ADMIN — Medication 50 MILLIGRAM(S): at 21:12

## 2021-10-19 RX ADMIN — HEPARIN SODIUM 5000 UNIT(S): 5000 INJECTION INTRAVENOUS; SUBCUTANEOUS at 06:08

## 2021-10-19 RX ADMIN — ATORVASTATIN CALCIUM 10 MILLIGRAM(S): 80 TABLET, FILM COATED ORAL at 21:13

## 2021-10-19 RX ADMIN — TAMSULOSIN HYDROCHLORIDE 0.4 MILLIGRAM(S): 0.4 CAPSULE ORAL at 21:13

## 2021-10-19 NOTE — PROGRESS NOTE ADULT - ASSESSMENT
The patient is an 87 yo male with Hx. of COPD, Dementia, Tremor , Bradycardia, Scoliosis, osteoporosis ,Left femur fracture 8/23/21 underwent IMN , bradycardia,  was sent to the ER from [facility] after he fell in the facility.    1. Acute hypoxic Respiratory Failure secondary to Pneumonia/Sepsis    - Continue doxycycline and cefepime. ID Consult appreciated    - Repeat Chest xray this am  2. Sinus Bradycardia   - Episode of tachy/shanti in ER   - EP to eval, no evidence of ACS.   - Monitor Troponin- negative   - DC Propranolol  - Echo 55-60   - EP Consult appreciated   3. SP Fall   - no obvious trauma at this point   - Monitor for pain  - ? Due to bradycardia  4. HLD   - Continue Atorvastatin  5. DVT prophylaxis   - Continue Heparin  6. Essential Tremor  - continue Primidone, increase to 100 BID  - Increase Topamax 50 BID  - Reached out to his neurologist Dr. Zamora who is on vacation  - DC Propranolol and monitor  7. UTI   - Continue Rocephin    The patient is an 87 yo male with Hx. of COPD, Dementia, Tremor , Bradycardia, Scoliosis, osteoporosis ,Left femur fracture 8/23/21 underwent IMN , bradycardia,  was sent to the ER from [facility] after he fell in the facility.    1. Acute hypoxic Respiratory Failure secondary to Pneumonia/Sepsis    - Continue doxycycline and cefepime. ID Consult appreciated    - Repeat Chest xray this am for lela  2. Sinus Bradycardia   - Episode of tachy/shanti in ER   - EP to eval, no evidence of ACS.   - Monitor Troponin- negative   - DC Propranolol  - Echo 55-60   - EP Consult appreciated   3. SP Fall   - no obvious trauma at this point   - Monitor for pain  - ? Due to bradycardia  4. HLD   - Continue Atorvastatin  5. DVT prophylaxis   - Continue Heparin  6. Essential Tremor  - continue Primidone, increase to 100 BID  - Increase Topamax 50 BID  - Reached out to his neurologist Dr. Zamora who is on vacation  - DC Propranolol and monitor  7. UTI   - Continue Rocephin    The patient is an 87 yo male with Hx. of COPD, Dementia, Tremor , Bradycardia, Scoliosis, osteoporosis ,Left femur fracture 8/23/21 underwent IMN , bradycardia,  was sent to the ER from [facility] after he fell in the facility.    1. Acute hypoxic Respiratory Failure secondary to Pneumonia/Sepsis    - Continue doxycycline and cefepime. ID Consult appreciated    - Repeat Chest xray this am for lela  2. Sinus Bradycardia   - Episode of tachy/shanti in ER   - EP to eval, no evidence of ACS.   - Monitor Troponin- negative   - DC Propranolol  - Echo 55-60   - EP Consult appreciated   3. SP Fall   - no obvious trauma at this point   - Monitor for pain  - ? Due to bradycardia  4. HLD   - Continue Atorvastatin  5. DVT prophylaxis   - Continue Heparin  6. Essential Tremor  - continue Primidone, increase to 100 BID  - Increase Topamax 50 BID  - Reached out to his neurologist Dr. Zamora who is on vacation  - DC Propranolol and monitor  7. UTI/Citrobacter   - Continue abx cefepime sensitive

## 2021-10-19 NOTE — PROGRESS NOTE ADULT - SUBJECTIVE AND OBJECTIVE BOX
85 yo M w/ PMHx dementia, essential tremor, COPD (not on home O2), HLD, admitted to telemetry service after a fall at his nursing home, no LOC or head strike, no obvious injuries. Patient is poor historian and unable to give recount of events. He was admitted in august for similar event and found to have femur fracture. While in the ER patient was found to be tachycardic as per ED documentation, since being on the unit patient has been intermittently in sinus bradycardia lowest in the 40s. Patient has no complaints at this time. He denies change in vision, dizziness, chest pain, sob, palpitations, n/v. As outpatient he was on Propranolol 80 mg daily for essential tremor.    10/19/21:  pt awake, pleasantly confused in NAD  TELE: sinus 50-60 bpm      MEDICATIONS  (STANDING):  atorvastatin 10 milliGRAM(s) Oral at bedtime  cefepime  Injectable. 1000 milliGRAM(s) IV Push every 12 hours  donepezil 5 milliGRAM(s) Oral at bedtime  doxycycline hyclate Capsule 100 milliGRAM(s) Oral every 12 hours  heparin   Injectable 5000 Unit(s) SubCutaneous every 12 hours  latanoprost 0.005% Ophthalmic Solution 1 Drop(s) Both EYES at bedtime  pantoprazole    Tablet 40 milliGRAM(s) Oral before breakfast  primidone 100 milliGRAM(s) Oral two times a day  propranolol 20 milliGRAM(s) Oral daily  senna 2 Tablet(s) Oral at bedtime  tamsulosin 0.4 milliGRAM(s) Oral at bedtime  timolol 0.5% Solution 1 Drop(s) Both EYES daily  topiramate 50 milliGRAM(s) Oral two times a day    MEDICATIONS  (PRN):  acetaminophen   Tablet .. 650 milliGRAM(s) Oral every 6 hours PRN Temp greater or equal to 38C (100.4F), Mild Pain (1 - 3)  ALBUTerol    90 MICROgram(s) HFA Inhaler 2 Puff(s) Inhalation every 6 hours PRN Bronchospasm  aluminum hydroxide/magnesium hydroxide/simethicone Suspension 30 milliLiter(s) Oral every 4 hours PRN Dyspepsia  magnesium hydroxide Suspension 30 milliLiter(s) Oral daily PRN Constipation  melatonin 3 milliGRAM(s) Oral at bedtime PRN Insomnia  ondansetron Injectable 4 milliGRAM(s) IV Push every 8 hours PRN Nausea and/or Vomiting    Allergies    azithromycin (Rash)    Intolerances    lactose (Diarrhea)      REVIEW of SYSTEMS: pt is poor historian but denies chest pain, SOB, palpitations, dizziness, lightheadedness.        Vital Signs Last 24 Hrs  T(C): 36.5 (19 Oct 2021 07:59), Max: 37.8 (18 Oct 2021 20:44)  T(F): 97.7 (19 Oct 2021 07:59), Max: 100 (18 Oct 2021 20:44)  HR: 59 (19 Oct 2021 07:59) (59 - 61)  BP: 139/51 (19 Oct 2021 07:59) (126/52 - 139/51)  BP(mean): --  RR: 18 (19 Oct 2021 07:59) (18 - 19)  SpO2: 94% (19 Oct 2021 07:59) (94% - 98%)                          10.5   7.68  )-----------( 211      ( 18 Oct 2021 07:32 )             33.1   10-18    137  |  106  |  19  ----------------------------<  89  4.0   |  27  |  0.69    Ca    8.4<L>      18 Oct 2021 07:32      PHYSICAL EXAM:    General: WN/WD NAD  Neurology: A&Ox3, nonfocal, TINSLEY x 4  HEENT: NC/AT, neck supple, no JVD, EOMI, PERRL  Respiratory: CTA B/L  CV: RRR, S1S2, no murmurs, rubs or gallops  Abdominal: Soft, NT, ND +BS  Extremities: No edema, + peripheral pulses  Skin: No rashes      < from: TTE Echo Complete w/o Contrast w/ Doppler (08.23.21 @ 09:26) >  OBSERVATIONS:  Technically difficult and extremely limited study  Unable to accurately assess ventricular or valvular function  Mitral Valve: Not well-visualized  Aortic Valve/Aorta: Not well-visualized  Tricuspid Valve: Not well-visualized  Pulmonic Valve: Not well-visualized  Left Atrium: Not well-visualized  Right Atrium: Not well-visualized  Left Ventricle: Not well-visualized  Right Ventricle: Not well-visualized        IMPRESSION:  Technically difficult and extremely limited study  Unable to accurately assess ventricular or valvular function

## 2021-10-19 NOTE — PROGRESS NOTE ADULT - SUBJECTIVE AND OBJECTIVE BOX
Patient seen and examined:  No acute events overnight.  No chest pain or sob. No palpitations.  Voiding well and tolerating po.    ROS: Negative except for above      Vital Signs Last 24 Hrs  T(C): 36.5 (19 Oct 2021 07:59), Max: 37.8 (18 Oct 2021 20:44)  T(F): 97.7 (19 Oct 2021 07:59), Max: 100 (18 Oct 2021 20:44)  HR: 59 (19 Oct 2021 07:59) (59 - 61)  BP: 139/51 (19 Oct 2021 07:59) (126/52 - 139/51)  BP(mean): --  RR: 18 (19 Oct 2021 07:59) (18 - 19)  SpO2: 94% (19 Oct 2021 07:59) (94% - 98%)    PHYSICAL EXAM:  Constitutional: NAD, awake and alert, thin  HEENT: PERR, EOMI, Normal Hearing, MMM  Neck: Soft and supple, No LAD, No JVD  Respiratory: Breath sounds are clear bilaterally, No wheezing, rales or rhonchi  Cardiovascular: S1 and S2, regular rate and rhythm, no Murmurs, gallops or rubs  Gastrointestinal: Bowel Sounds present, soft, nontender, nondistended, no guarding, no rebound  Extremities: No peripheral edema  Vascular: 2+ peripheral pulses  Neurological: A/O x 3, no focal deficits  Musculoskeletal: 5/5 strength b/l upper and lower extremities  Skin: No rashes      LABS: All Labs Reviewed:                        10.5   7.68  )-----------( 211      ( 18 Oct 2021 07:32 )             33.1     10-18    137  |  106  |  19  ----------------------------<  89  4.0   |  27  |  0.69    Ca    8.4<L>      18 Oct 2021 07:32  Blood Culture: 10-16 @ 12:29  Organism Citrobacter amalonaticus complex  Gram Stain Blood -- Gram Stain --  Specimen Source Clean Catch None  Culture-Blood -- Patient seen and examined:  No acute events overnight.  No chest pain or sob. No palpitations.  Voiding well and tolerating po.    ROS: Negative except for above      Vital Signs Last 24 Hrs  T(C): 36.5 (19 Oct 2021 07:59), Max: 37.8 (18 Oct 2021 20:44)  T(F): 97.7 (19 Oct 2021 07:59), Max: 100 (18 Oct 2021 20:44)  HR: 59 (19 Oct 2021 07:59) (59 - 61)  BP: 139/51 (19 Oct 2021 07:59) (126/52 - 139/51)  BP(mean): --  RR: 18 (19 Oct 2021 07:59) (18 - 19)  SpO2: 94% (19 Oct 2021 07:59) (94% - 98%)    PHYSICAL EXAM:  Constitutional: NAD, awake and alert, thin  HEENT: PERR, EOMI, Normal Hearing, MMM  Neck: Soft and supple, No LAD, No JVD  Respiratory: + Ronchi bilateral  Cardiovascular: S1 and S2, regular rate and rhythm, no Murmurs, gallops or rubs  Gastrointestinal: Bowel Sounds present, soft, nontender, nondistended, no guarding, no rebound  Extremities: No peripheral edema  Vascular: 2+ peripheral pulses  Neurological: A/O x 3, no focal deficits  Musculoskeletal: 5/5 strength b/l upper and lower extremities  Skin: No rashes      LABS: All Labs Reviewed:                        10.5   7.68  )-----------( 211      ( 18 Oct 2021 07:32 )             33.1     10-18    137  |  106  |  19  ----------------------------<  89  4.0   |  27  |  0.69    Ca    8.4<L>      18 Oct 2021 07:32  Blood Culture: 10-16 @ 12:29  Organism Citrobacter amalonaticus complex  Gram Stain Blood -- Gram Stain --  Specimen Source Clean Catch None  Culture-Blood --

## 2021-10-19 NOTE — PROGRESS NOTE ADULT - ASSESSMENT
The patient is an 87 yo male with Hx. of COPD, Dementia, Tremor , Bradycardia, Scoliosis, osteoporosis ,Left femur fracture 8/23/21 underwent IMN , bradycardia,  admitted from snf on 10/16 for evaluation after a fall; patient cannot provide any details and history per medical record.     1. Patient admitted with pneumonia, also noted with leukocytosis which most likely is reactive to infection; patient at risk for gram negative rods and other resistant bacteria   - follow up cultures   - serial cbc and monitor temperature   - reviewed prior medical records to evaluate for resistant or atypical pathogens   - oxygen and nebs as needed   - iv hydration and supportive care   - day # 3 cefepime and doxycycline  - tolerating antibiotics without rashes or side effects   - has gram negative rods in urine, Citrobacter and is sensitive to cefepime  2. other issues; per medicine

## 2021-10-19 NOTE — PROGRESS NOTE ADULT - ASSESSMENT
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      A/P: s/p fall/? syncope  Continue tele monitoring  HR better 50-60 bpm  Propranolol now 20mg PO daily for essential tremor, he remains on Timolol and Aricept which can also contribute to bradycardia.  s/p fall possibly 2/2 to bradycardia, recommend DC BB if possible switch to alt therapy for ET, if not able to stop recommend lowest tolerated dose, last dose 80mg at 1100 this AM. Patient has chronotropic competence.   Pt had follow up echo performed today, awaiting results      Attestation Statements:   Attestation Statements:  Attending supervision statement: I have personally seen and examined the patient.  I fully participated in the care of this patient.  I have made amendments to the documentation where necessary, and agree with the history, physical exam, and plan as documented by the ACP.     pt is s/p mechanical fall and noted to have sinus bradycardia to 40s bpm while on propranolol that he takes for essential tremors for many years. He has a lot of resting tremors with no improvement while on bb. I think it is worth stopping any bradycardia inducing medication and ziopatch on discharge to further monitor for shanti episodes.

## 2021-10-19 NOTE — PROGRESS NOTE ADULT - SUBJECTIVE AND OBJECTIVE BOX
Date of service: 10-19-21 @ 15:43      Patient lying in bed; has "wet" cough, afebrile      ROS unable to obtain secondary to patient medical condition     MEDICATIONS  (STANDING):  atorvastatin 10 milliGRAM(s) Oral at bedtime  cefepime  Injectable. 1000 milliGRAM(s) IV Push every 12 hours  donepezil 5 milliGRAM(s) Oral at bedtime  doxycycline hyclate Capsule 100 milliGRAM(s) Oral every 12 hours  furosemide   Injectable 40 milliGRAM(s) IV Push once  heparin   Injectable 5000 Unit(s) SubCutaneous every 12 hours  latanoprost 0.005% Ophthalmic Solution 1 Drop(s) Both EYES at bedtime  pantoprazole    Tablet 40 milliGRAM(s) Oral before breakfast  primidone 100 milliGRAM(s) Oral two times a day  senna 2 Tablet(s) Oral at bedtime  tamsulosin 0.4 milliGRAM(s) Oral at bedtime  timolol 0.5% Solution 1 Drop(s) Both EYES daily  topiramate 50 milliGRAM(s) Oral two times a day    MEDICATIONS  (PRN):  acetaminophen   Tablet .. 650 milliGRAM(s) Oral every 6 hours PRN Temp greater or equal to 38C (100.4F), Mild Pain (1 - 3)  ALBUTerol    90 MICROgram(s) HFA Inhaler 2 Puff(s) Inhalation every 6 hours PRN Bronchospasm  aluminum hydroxide/magnesium hydroxide/simethicone Suspension 30 milliLiter(s) Oral every 4 hours PRN Dyspepsia  magnesium hydroxide Suspension 30 milliLiter(s) Oral daily PRN Constipation  melatonin 3 milliGRAM(s) Oral at bedtime PRN Insomnia  ondansetron Injectable 4 milliGRAM(s) IV Push every 8 hours PRN Nausea and/or Vomiting      Vital Signs Last 24 Hrs  T(C): 36.5 (19 Oct 2021 07:59), Max: 37.8 (18 Oct 2021 20:44)  T(F): 97.7 (19 Oct 2021 07:59), Max: 100 (18 Oct 2021 20:44)  HR: 59 (19 Oct 2021 07:59) (59 - 61)  BP: 139/51 (19 Oct 2021 07:59) (126/52 - 139/51)  BP(mean): --  RR: 18 (19 Oct 2021 07:59) (18 - 19)  SpO2: 94% (19 Oct 2021 07:59) (94% - 98%)        Physical Exam:      Constitutional: frail looking  HEENT: NC/AT, EOMI, PERRLA, conjunctivae clear; ears and nose atraumatic; pharynx clear  Neck: supple; thyroid not palpable  Back: no tenderness  Respiratory: respiratory effort normal; clear to auscultation; bilateral rhonchi  Cardiovascular: S1S2 regular, no murmurs  Abdomen: soft, not tender, not distended, positive BS; no liver or spleen organomegaly  Genitourinary: no suprapubic tenderness  Musculoskeletal: no muscle tenderness, no joint swelling or tenderness  Neurological/ Psychiatric:  moving all extremities  Skin: no rashes; no palpable lesions    Labs: all available labs reviewed                       Labs:                   Labs:                        10.5   7.68  )-----------( 211      ( 18 Oct 2021 07:32 )             33.1     10-18    137  |  106  |  19  ----------------------------<  89  4.0   |  27  |  0.69    Ca    8.4<L>      18 Oct 2021 07:32             Cultures:       Culture - Urine (collected 10-16-21 @ 12:29)  Source: Clean Catch None  Final Report (10-18-21 @ 16:39):    >100,000 CFU/ml Citrobacter amalonaticus complex  Organism: Citrobacter amalonaticus complex (10-18-21 @ 16:39)  Organism: Citrobacter amalonaticus complex (10-18-21 @ 16:39)      -  Amikacin: S <=16      -  Amoxicillin/Clavulanic Acid: S <=8/4      -  Ampicillin: R >16 These ampicillin results predict results for amoxicillin      -  Ampicillin/Sulbactam: S 8/4 Enterobacter, Citrobacter, and Serratia may develop resistance during prolonged therapy (3-4 days)      -  Aztreonam: S <=4      -  Cefazolin: R >16      -  Cefepime: S <=2      -  Cefoxitin: S <=8      -  Ceftriaxone: S <=1 Enterobacter, Citrobacter, and Serratia may develop resistance during prolonged therapy      -  Ciprofloxacin: S <=0.25      -  Ertapenem: S <=0.5      -  Gentamicin: S <=2      -  Imipenem: S <=1      -  Levofloxacin: S <=0.5      -  Meropenem: S <=1      -  Nitrofurantoin: I 64 Should not be used to treat pyelonephritis      -  Piperacillin/Tazobactam: S <=8      -  Tigecycline: S <=2      -  Tobramycin: S <=2      -  Trimethoprim/Sulfamethoxazole: S <=0.5/9.5      Method Type: JOANA    Culture - Blood (collected 10-16-21 @ 11:46)  Source: .Blood None  Preliminary Report (10-17-21 @ 20:01):    No growth to date.    Culture - Blood (collected 10-16-21 @ 11:46)  Source: .Blood None  Preliminary Report (10-17-21 @ 20:01):    No growth to date.            < from: Xray Chest 1 View- PORTABLE-Urgent (10.16.21 @ 12:56) >    IMPRESSION: There is bilateral lower lobe linear and patchy opacification is more prominent on the left compatible with atelectasis or pneumonia. The heart is normal in size. Degenerative changes are noted of the bones.    < end of copied text >      Radiology: all available radiological tests reviewed    Advanced directives addressed: full resuscitation

## 2021-10-20 LAB
ALBUMIN SERPL ELPH-MCNC: 2.3 G/DL — LOW (ref 3.3–5)
ALP SERPL-CCNC: 164 U/L — HIGH (ref 40–120)
ALT FLD-CCNC: 34 U/L — SIGNIFICANT CHANGE UP (ref 12–78)
ANION GAP SERPL CALC-SCNC: 6 MMOL/L — SIGNIFICANT CHANGE UP (ref 5–17)
AST SERPL-CCNC: 28 U/L — SIGNIFICANT CHANGE UP (ref 15–37)
BILIRUB SERPL-MCNC: 0.4 MG/DL — SIGNIFICANT CHANGE UP (ref 0.2–1.2)
BUN SERPL-MCNC: 25 MG/DL — HIGH (ref 7–23)
CALCIUM SERPL-MCNC: 8.6 MG/DL — SIGNIFICANT CHANGE UP (ref 8.5–10.1)
CHLORIDE SERPL-SCNC: 103 MMOL/L — SIGNIFICANT CHANGE UP (ref 96–108)
CO2 SERPL-SCNC: 28 MMOL/L — SIGNIFICANT CHANGE UP (ref 22–31)
CREAT SERPL-MCNC: 0.71 MG/DL — SIGNIFICANT CHANGE UP (ref 0.5–1.3)
GLUCOSE SERPL-MCNC: 80 MG/DL — SIGNIFICANT CHANGE UP (ref 70–99)
HCT VFR BLD CALC: 34.6 % — LOW (ref 39–50)
HGB BLD-MCNC: 11.1 G/DL — LOW (ref 13–17)
MCHC RBC-ENTMCNC: 30.8 PG — SIGNIFICANT CHANGE UP (ref 27–34)
MCHC RBC-ENTMCNC: 32.1 GM/DL — SIGNIFICANT CHANGE UP (ref 32–36)
MCV RBC AUTO: 96.1 FL — SIGNIFICANT CHANGE UP (ref 80–100)
NT-PROBNP SERPL-SCNC: 493 PG/ML — HIGH (ref 0–450)
PLATELET # BLD AUTO: 242 K/UL — SIGNIFICANT CHANGE UP (ref 150–400)
POTASSIUM SERPL-MCNC: 3.7 MMOL/L — SIGNIFICANT CHANGE UP (ref 3.5–5.3)
POTASSIUM SERPL-SCNC: 3.7 MMOL/L — SIGNIFICANT CHANGE UP (ref 3.5–5.3)
PROT SERPL-MCNC: 6.1 GM/DL — SIGNIFICANT CHANGE UP (ref 6–8.3)
RBC # BLD: 3.6 M/UL — LOW (ref 4.2–5.8)
RBC # FLD: 14.2 % — SIGNIFICANT CHANGE UP (ref 10.3–14.5)
SARS-COV-2 RNA SPEC QL NAA+PROBE: SIGNIFICANT CHANGE UP
SODIUM SERPL-SCNC: 137 MMOL/L — SIGNIFICANT CHANGE UP (ref 135–145)
WBC # BLD: 6.59 K/UL — SIGNIFICANT CHANGE UP (ref 3.8–10.5)
WBC # FLD AUTO: 6.59 K/UL — SIGNIFICANT CHANGE UP (ref 3.8–10.5)

## 2021-10-20 PROCEDURE — 99232 SBSQ HOSP IP/OBS MODERATE 35: CPT

## 2021-10-20 PROCEDURE — 99233 SBSQ HOSP IP/OBS HIGH 50: CPT

## 2021-10-20 RX ADMIN — ATORVASTATIN CALCIUM 10 MILLIGRAM(S): 80 TABLET, FILM COATED ORAL at 21:52

## 2021-10-20 RX ADMIN — PANTOPRAZOLE SODIUM 40 MILLIGRAM(S): 20 TABLET, DELAYED RELEASE ORAL at 06:10

## 2021-10-20 RX ADMIN — TAMSULOSIN HYDROCHLORIDE 0.4 MILLIGRAM(S): 0.4 CAPSULE ORAL at 21:52

## 2021-10-20 RX ADMIN — Medication 50 MILLIGRAM(S): at 21:52

## 2021-10-20 RX ADMIN — Medication 100 MILLIGRAM(S): at 21:52

## 2021-10-20 RX ADMIN — HEPARIN SODIUM 5000 UNIT(S): 5000 INJECTION INTRAVENOUS; SUBCUTANEOUS at 06:10

## 2021-10-20 RX ADMIN — Medication 50 MILLIGRAM(S): at 11:10

## 2021-10-20 RX ADMIN — CEFEPIME 1000 MILLIGRAM(S): 1 INJECTION, POWDER, FOR SOLUTION INTRAMUSCULAR; INTRAVENOUS at 11:09

## 2021-10-20 RX ADMIN — Medication 1 DROP(S): at 14:27

## 2021-10-20 RX ADMIN — PRIMIDONE 100 MILLIGRAM(S): 250 TABLET ORAL at 21:52

## 2021-10-20 RX ADMIN — CEFEPIME 1000 MILLIGRAM(S): 1 INJECTION, POWDER, FOR SOLUTION INTRAMUSCULAR; INTRAVENOUS at 21:52

## 2021-10-20 RX ADMIN — PRIMIDONE 100 MILLIGRAM(S): 250 TABLET ORAL at 11:09

## 2021-10-20 RX ADMIN — Medication 100 MILLIGRAM(S): at 11:09

## 2021-10-20 RX ADMIN — LATANOPROST 1 DROP(S): 0.05 SOLUTION/ DROPS OPHTHALMIC; TOPICAL at 21:52

## 2021-10-20 RX ADMIN — HEPARIN SODIUM 5000 UNIT(S): 5000 INJECTION INTRAVENOUS; SUBCUTANEOUS at 18:26

## 2021-10-20 RX ADMIN — DONEPEZIL HYDROCHLORIDE 5 MILLIGRAM(S): 10 TABLET, FILM COATED ORAL at 21:52

## 2021-10-20 NOTE — PROGRESS NOTE ADULT - ASSESSMENT
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      A/P: s/p fall/? syncope  Continue tele monitoring  HR better 50-60 bpm  Propranolol now discontinued, no further significant bradycardia seen.  Repeat 2D echo reveals normal LV function, EF 60-65%  s/p fall possibly 2/2 to bradycardia, recommend DC BB if possible switch to alt therapy for ET  Recommend Zio patch on discharge to further monitor HR off BB and outpatient F/U in EP.  Plan discussed with Dr. Patten.

## 2021-10-20 NOTE — PROGRESS NOTE ADULT - ASSESSMENT
The patient is an 85 yo male with Hx. of COPD, Dementia, Tremor , Bradycardia, Scoliosis, osteoporosis ,Left femur fracture 8/23/21 underwent IMN , bradycardia,  admitted from snf on 10/16 for evaluation after a fall; patient cannot provide any details and history per medical record.     1. Patient admitted with pneumonia, also noted with leukocytosis which most likely is reactive to infection; patient at risk for gram negative rods and other resistant bacteria   - follow up cultures   - serial cbc and monitor temperature   - reviewed prior medical records to evaluate for resistant or atypical pathogens   - oxygen and nebs as needed   - iv hydration and supportive care   - day # 4 cefepime and doxycycline  - tolerating antibiotics without rashes or side effects   - has gram negative rods in urine, Citrobacter and is sensitive to cefepime  2. other issues; per medicine

## 2021-10-20 NOTE — PROGRESS NOTE ADULT - SUBJECTIVE AND OBJECTIVE BOX
Patient seen and examined:  No acute issues overnight.  No chest pain or sob. Voiding well.  Tolerating po. Afebrile    ROS: Negative except above    Vital Signs Last 24 Hrs  T(C): 36.8 (20 Oct 2021 07:43), Max: 36.8 (20 Oct 2021 07:43)  T(F): 98.3 (20 Oct 2021 07:43), Max: 98.3 (20 Oct 2021 07:43)  HR: 88 (20 Oct 2021 07:43) (60 - 88)  BP: 120/50 (20 Oct 2021 07:43) (120/50 - 130/53)  BP(mean): --  RR: 18 (20 Oct 2021 07:43) (18 - 18)  SpO2: 97% (20 Oct 2021 07:43) (97% - 98%)    PHYSICAL EXAM:  Constitutional: NAD, awake and alert, thin  HEENT: PERR, EOMI, Normal Hearing, MMM  Neck: Soft and supple, No LAD, No JVD  Respiratory: Breath sounds are clear bilaterally, No wheezing, rales or rhonchi  Cardiovascular: S1 and S2, regular rate and rhythm, no Murmurs, gallops or rubs  Gastrointestinal: Bowel Sounds present, soft, nontender, nondistended, no guarding, no rebound  Extremities: No peripheral edema  Vascular: 2+ peripheral pulses  Neurological: A/O x 3, no focal deficits  Musculoskeletal: 5/5 strength b/l upper and lower extremities  Skin: No rashes    LABS: All Labs Reviewed:                        11.1   6.59  )-----------( 242      ( 20 Oct 2021 07:12 )             34.6     10-20    137  |  103  |  25<H>  ----------------------------<  80  3.7   |  28  |  0.71    Ca    8.6      20 Oct 2021 07:12    TPro  6.1  /  Alb  2.3<L>  /  TBili  0.4  /  DBili  x   /  AST  28  /  ALT  34  /  AlkPhos  164<H>  10-20Blood Culture: 10-16 @ 12:29  Organism Citrobacter amalonaticus complex  Gram Stain Blood -- Gram Stain --  Specimen Source Clean Catch None  Culture-Blood --    10-16 @ 11:46  Organism --  Gram Stain Blood -- Gram Stain --  Specimen Source .Blood None  Culture-Blood --

## 2021-10-20 NOTE — PROGRESS NOTE ADULT - ASSESSMENT
The patient is an 85 yo male with Hx. of COPD, Dementia, Tremor , Bradycardia, Scoliosis, osteoporosis ,Left femur fracture 8/23/21 underwent IMN , bradycardia,  was sent to the ER from [facility] after he fell in the facility.    1. Acute hypoxic Respiratory Failure secondary to Pneumonia/Sepsis    - Continue doxycycline and cefepime. ID Consult appreciated    - Repeat Chest xray this am for kristingiorgi  2. Sinus Bradycardia   - Episode of tachy/shanti in ER   - EP to eval, no evidence of ACS.   - Monitor Troponin- negative   - DC Propranolol  - Echo 55-60   - EP Consult appreciated   - HR now 70s  3. SP Fall   - no obvious trauma at this point   - Monitor for pain  - ? Due to bradycardia  4. HLD   - Continue Atorvastatin  5. DVT prophylaxis   - Continue Heparin  6. Essential Tremor  - continue Primidone, increase to 100 BID  - Increase Topamax 50 BID  - Reached out to his neurologist Dr. Zamora who is on vacation  - DC Propranolol and monitor  7. UTI/Citrobacter   - Continue abx cefepime sensitive

## 2021-10-20 NOTE — PROGRESS NOTE ADULT - SUBJECTIVE AND OBJECTIVE BOX
Date of service: 10-20-21 @ 12:55      Patient lying in bed; markedly improved cough after lasix      ROS unable to obtain secondary to patient medical condition   MEDICATIONS  (STANDING):  atorvastatin 10 milliGRAM(s) Oral at bedtime  cefepime  Injectable. 1000 milliGRAM(s) IV Push every 12 hours  donepezil 5 milliGRAM(s) Oral at bedtime  doxycycline hyclate Capsule 100 milliGRAM(s) Oral every 12 hours  heparin   Injectable 5000 Unit(s) SubCutaneous every 12 hours  latanoprost 0.005% Ophthalmic Solution 1 Drop(s) Both EYES at bedtime  pantoprazole    Tablet 40 milliGRAM(s) Oral before breakfast  primidone 100 milliGRAM(s) Oral two times a day  senna 2 Tablet(s) Oral at bedtime  tamsulosin 0.4 milliGRAM(s) Oral at bedtime  timolol 0.5% Solution 1 Drop(s) Both EYES daily  topiramate 50 milliGRAM(s) Oral two times a day    MEDICATIONS  (PRN):  acetaminophen   Tablet .. 650 milliGRAM(s) Oral every 6 hours PRN Temp greater or equal to 38C (100.4F), Mild Pain (1 - 3)  ALBUTerol    90 MICROgram(s) HFA Inhaler 2 Puff(s) Inhalation every 6 hours PRN Bronchospasm  aluminum hydroxide/magnesium hydroxide/simethicone Suspension 30 milliLiter(s) Oral every 4 hours PRN Dyspepsia  magnesium hydroxide Suspension 30 milliLiter(s) Oral daily PRN Constipation  melatonin 3 milliGRAM(s) Oral at bedtime PRN Insomnia  ondansetron Injectable 4 milliGRAM(s) IV Push every 8 hours PRN Nausea and/or Vomiting      Vital Signs Last 24 Hrs  T(C): 36.8 (20 Oct 2021 07:43), Max: 36.8 (20 Oct 2021 07:43)  T(F): 98.3 (20 Oct 2021 07:43), Max: 98.3 (20 Oct 2021 07:43)  HR: 88 (20 Oct 2021 07:43) (60 - 88)  BP: 120/50 (20 Oct 2021 07:43) (120/50 - 130/53)  BP(mean): --  RR: 18 (20 Oct 2021 07:43) (18 - 18)  SpO2: 97% (20 Oct 2021 07:43) (97% - 98%)        Physical Exam:      Constitutional: frail looking  HEENT: NC/AT, EOMI, PERRLA, conjunctivae clear; ears and nose atraumatic; pharynx clear  Neck: supple; thyroid not palpable  Back: no tenderness  Respiratory: respiratory effort normal; clear to auscultation; bilateral rhonchi improved  Cardiovascular: S1S2 regular, no murmurs  Abdomen: soft, not tender, not distended, positive BS; no liver or spleen organomegaly  Genitourinary: no suprapubic tenderness  Musculoskeletal: no muscle tenderness, no joint swelling or tenderness  Neurological/ Psychiatric:  moving all extremities  Skin: no rashes; no palpable lesions    Labs: all available labs reviewed                       Labs:                   Labs:                        10.5   7.68  )-----------( 211      ( 18 Oct 2021 07:32 )             33.1     10-18    137  |  106  |  19  ----------------------------<  89  4.0   |  27  |  0.69    Ca    8.4<L>      18 Oct 2021 07:32             Cultures:       Culture - Urine (collected 10-16-21 @ 12:29)  Source: Clean Catch None  Final Report (10-18-21 @ 16:39):    >100,000 CFU/ml Citrobacter amalonaticus complex  Organism: Citrobacter amalonaticus complex (10-18-21 @ 16:39)  Organism: Citrobacter amalonaticus complex (10-18-21 @ 16:39)      -  Amikacin: S <=16      -  Amoxicillin/Clavulanic Acid: S <=8/4      -  Ampicillin: R >16 These ampicillin results predict results for amoxicillin      -  Ampicillin/Sulbactam: S 8/4 Enterobacter, Citrobacter, and Serratia may develop resistance during prolonged therapy (3-4 days)      -  Aztreonam: S <=4      -  Cefazolin: R >16      -  Cefepime: S <=2      -  Cefoxitin: S <=8      -  Ceftriaxone: S <=1 Enterobacter, Citrobacter, and Serratia may develop resistance during prolonged therapy      -  Ciprofloxacin: S <=0.25      -  Ertapenem: S <=0.5      -  Gentamicin: S <=2      -  Imipenem: S <=1      -  Levofloxacin: S <=0.5      -  Meropenem: S <=1      -  Nitrofurantoin: I 64 Should not be used to treat pyelonephritis      -  Piperacillin/Tazobactam: S <=8      -  Tigecycline: S <=2      -  Tobramycin: S <=2      -  Trimethoprim/Sulfamethoxazole: S <=0.5/9.5      Method Type: JOANA    Culture - Blood (collected 10-16-21 @ 11:46)  Source: .Blood None  Preliminary Report (10-17-21 @ 20:01):    No growth to date.    Culture - Blood (collected 10-16-21 @ 11:46)  Source: .Blood None  Preliminary Report (10-17-21 @ 20:01):    No growth to date.            < from: Xray Chest 1 View- PORTABLE-Urgent (10.16.21 @ 12:56) >    IMPRESSION: There is bilateral lower lobe linear and patchy opacification is more prominent on the left compatible with atelectasis or pneumonia. The heart is normal in size. Degenerative changes are noted of the bones.    < end of copied text >      Radiology: all available radiological tests reviewed    Advanced directives addressed: full resuscitation

## 2021-10-20 NOTE — PROGRESS NOTE ADULT - SUBJECTIVE AND OBJECTIVE BOX
87 yo M w/ PMHx dementia, essential tremor, COPD (not on home O2), HLD, admitted to telemetry service after a fall at his nursing home, no LOC or head strike, no obvious injuries. Patient is poor historian and unable to give recount of events. He was admitted in august for similar event and found to have femur fracture. While in the ER patient was found to be tachycardic as per ED documentation, since being on the unit patient has been intermittently in sinus bradycardia lowest in the 40s. Patient has no complaints at this time. He denies change in vision, dizziness, chest pain, sob, palpitations, n/v. As outpatient he was on Propranolol 80 mg daily for essential tremor.    10/19/21:  pt awake, pleasantly confused in NAD  TELE: sinus 50-60 bpm    10/20/21:  pt resting comfortably in bed in NAD, offers no c/o, pleasantly confused.  Remains off BB, HR is improved.  TELE: SR 50-60 bpm, no further significant bradycardia since 10/18      MEDICATIONS  (STANDING):  atorvastatin 10 milliGRAM(s) Oral at bedtime  cefepime  Injectable. 1000 milliGRAM(s) IV Push every 12 hours  donepezil 5 milliGRAM(s) Oral at bedtime  doxycycline hyclate Capsule 100 milliGRAM(s) Oral every 12 hours  heparin   Injectable 5000 Unit(s) SubCutaneous every 12 hours  latanoprost 0.005% Ophthalmic Solution 1 Drop(s) Both EYES at bedtime  pantoprazole    Tablet 40 milliGRAM(s) Oral before breakfast  primidone 100 milliGRAM(s) Oral two times a day  senna 2 Tablet(s) Oral at bedtime  tamsulosin 0.4 milliGRAM(s) Oral at bedtime  timolol 0.5% Solution 1 Drop(s) Both EYES daily  topiramate 50 milliGRAM(s) Oral two times a day    MEDICATIONS  (PRN):  acetaminophen   Tablet .. 650 milliGRAM(s) Oral every 6 hours PRN Temp greater or equal to 38C (100.4F), Mild Pain (1 - 3)  ALBUTerol    90 MICROgram(s) HFA Inhaler 2 Puff(s) Inhalation every 6 hours PRN Bronchospasm  aluminum hydroxide/magnesium hydroxide/simethicone Suspension 30 milliLiter(s) Oral every 4 hours PRN Dyspepsia  magnesium hydroxide Suspension 30 milliLiter(s) Oral daily PRN Constipation  melatonin 3 milliGRAM(s) Oral at bedtime PRN Insomnia  ondansetron Injectable 4 milliGRAM(s) IV Push every 8 hours PRN Nausea and/or Vomiting    Allergies    azithromycin (Rash)    Intolerances    lactose (Diarrhea)      Vital Signs Last 24 Hrs  T(C): 36.8 (20 Oct 2021 07:43), Max: 36.8 (20 Oct 2021 07:43)  T(F): 98.3 (20 Oct 2021 07:43), Max: 98.3 (20 Oct 2021 07:43)  HR: 88 (20 Oct 2021 07:43) (60 - 88)  BP: 120/50 (20 Oct 2021 07:43) (120/50 - 130/53)  BP(mean): --  RR: 18 (20 Oct 2021 07:43) (18 - 18)  SpO2: 97% (20 Oct 2021 07:43) (97% - 98%)                          11.1   6.59  )-----------( 242      ( 20 Oct 2021 07:12 )             34.6     10-20    137  |  103  |  25<H>  ----------------------------<  80  3.7   |  28  |  0.71    Ca    8.6      20 Oct 2021 07:12    TPro  6.1  /  Alb  2.3<L>  /  TBili  0.4  /  DBili  x   /  AST  28  /  ALT  34  /  AlkPhos  164<H>  10-20      PHYSICAL EXAM:    General: elderly male, WN/WD NAD  Neurology: Alert to name, nonfocal, TINSLEY x 4  HEENT: NC/AT, neck supple, no JVD, EOMI, PERRL  Respiratory: CTA B/L  CV: RRR, S1S2, no murmurs, rubs or gallops  Abdominal: Soft, NT, ND +BS  Extremities: No edema, + peripheral pulses  Skin: No rashes      < from: Transthoracic Echocardiogram Follow Up (10.19.21 @ 10:04) >   Impression     Summary     The left ventricle is normal in size, wall motion and contractility.   Mild concentric left ventricular hypertrophy is present.   Estimated left ventricular ejection fraction is 60-65 %.   The left atrium appears mildly dilated.   Normal appearing right atrium.   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   Normal aortic valve structure.   Mild (1+) aortic regurgitation is present.   Fibrocalcific changes noted to the mitral valve leaflets with preserved   leaflet excursion.   Trace mitral regurgitation is present.

## 2021-10-21 ENCOUNTER — TRANSCRIPTION ENCOUNTER (OUTPATIENT)
Age: 86
End: 2021-10-21

## 2021-10-21 VITALS
HEART RATE: 83 BPM | OXYGEN SATURATION: 95 % | TEMPERATURE: 98 F | RESPIRATION RATE: 18 BRPM | DIASTOLIC BLOOD PRESSURE: 54 MMHG | SYSTOLIC BLOOD PRESSURE: 116 MMHG

## 2021-10-21 PROCEDURE — 99238 HOSP IP/OBS DSCHRG MGMT 30/<: CPT

## 2021-10-21 RX ORDER — PROPRANOLOL HCL 160 MG
1 CAPSULE, EXTENDED RELEASE 24HR ORAL
Qty: 0 | Refills: 0 | DISCHARGE

## 2021-10-21 RX ORDER — ATORVASTATIN CALCIUM 80 MG/1
1 TABLET, FILM COATED ORAL
Qty: 0 | Refills: 0 | DISCHARGE
Start: 2021-10-21

## 2021-10-21 RX ORDER — TOPIRAMATE 25 MG
1 TABLET ORAL
Qty: 0 | Refills: 0 | DISCHARGE

## 2021-10-21 RX ORDER — PRIMIDONE 250 MG/1
2 TABLET ORAL
Qty: 0 | Refills: 0 | DISCHARGE

## 2021-10-21 RX ORDER — FOLIC ACID 0.8 MG
1 TABLET ORAL
Qty: 0 | Refills: 0 | DISCHARGE

## 2021-10-21 RX ORDER — TRAMADOL HYDROCHLORIDE 50 MG/1
1 TABLET ORAL
Qty: 0 | Refills: 0 | DISCHARGE

## 2021-10-21 RX ORDER — CHOLECALCIFEROL (VITAMIN D3) 125 MCG
1 CAPSULE ORAL
Qty: 0 | Refills: 0 | DISCHARGE

## 2021-10-21 RX ORDER — TOPIRAMATE 25 MG
1 TABLET ORAL
Qty: 0 | Refills: 0 | DISCHARGE
Start: 2021-10-21

## 2021-10-21 RX ORDER — PRIMIDONE 250 MG/1
2 TABLET ORAL
Qty: 0 | Refills: 0 | DISCHARGE
Start: 2021-10-21

## 2021-10-21 RX ORDER — LANOLIN ALCOHOL/MO/W.PET/CERES
1 CREAM (GRAM) TOPICAL
Qty: 0 | Refills: 0 | DISCHARGE
Start: 2021-10-21

## 2021-10-21 RX ORDER — BACITRACIN ZINC 500 UNIT/G
1 OINTMENT IN PACKET (EA) TOPICAL
Qty: 0 | Refills: 0 | DISCHARGE

## 2021-10-21 RX ADMIN — PRIMIDONE 100 MILLIGRAM(S): 250 TABLET ORAL at 10:13

## 2021-10-21 RX ADMIN — Medication 50 MILLIGRAM(S): at 10:13

## 2021-10-21 RX ADMIN — Medication 1 DROP(S): at 10:37

## 2021-10-21 RX ADMIN — Medication 100 MILLIGRAM(S): at 10:13

## 2021-10-21 RX ADMIN — CEFEPIME 1000 MILLIGRAM(S): 1 INJECTION, POWDER, FOR SOLUTION INTRAMUSCULAR; INTRAVENOUS at 10:13

## 2021-10-21 NOTE — PROGRESS NOTE ADULT - SUBJECTIVE AND OBJECTIVE BOX
Date of service: 10-21-21 @ 14:29      Patient lying in bed; no complaints      ROS: no fever or chills; denies dizziness, no HA, no SOB or cough, no abdominal pain, no diarrhea or constipation; no dysuria, no urinary frequency, no legs pain, no rashes    MEDICATIONS  (STANDING):  atorvastatin 10 milliGRAM(s) Oral at bedtime  cefepime  Injectable. 1000 milliGRAM(s) IV Push every 12 hours  donepezil 5 milliGRAM(s) Oral at bedtime  doxycycline hyclate Capsule 100 milliGRAM(s) Oral every 12 hours  heparin   Injectable 5000 Unit(s) SubCutaneous every 12 hours  latanoprost 0.005% Ophthalmic Solution 1 Drop(s) Both EYES at bedtime  pantoprazole    Tablet 40 milliGRAM(s) Oral before breakfast  primidone 100 milliGRAM(s) Oral two times a day  senna 2 Tablet(s) Oral at bedtime  tamsulosin 0.4 milliGRAM(s) Oral at bedtime  timolol 0.5% Solution 1 Drop(s) Both EYES daily  topiramate 50 milliGRAM(s) Oral two times a day    MEDICATIONS  (PRN):  acetaminophen   Tablet .. 650 milliGRAM(s) Oral every 6 hours PRN Temp greater or equal to 38C (100.4F), Mild Pain (1 - 3)  ALBUTerol    90 MICROgram(s) HFA Inhaler 2 Puff(s) Inhalation every 6 hours PRN Bronchospasm  aluminum hydroxide/magnesium hydroxide/simethicone Suspension 30 milliLiter(s) Oral every 4 hours PRN Dyspepsia  magnesium hydroxide Suspension 30 milliLiter(s) Oral daily PRN Constipation  melatonin 3 milliGRAM(s) Oral at bedtime PRN Insomnia  ondansetron Injectable 4 milliGRAM(s) IV Push every 8 hours PRN Nausea and/or Vomiting      Vital Signs Last 24 Hrs  T(C): 36.6 (21 Oct 2021 07:53), Max: 37 (20 Oct 2021 19:34)  T(F): 97.8 (21 Oct 2021 07:53), Max: 98.6 (20 Oct 2021 19:34)  HR: 83 (21 Oct 2021 07:53) (83 - 86)  BP: 116/54 (21 Oct 2021 07:53) (113/58 - 116/54)  BP(mean): --  RR: 18 (21 Oct 2021 07:53) (18 - 19)  SpO2: 95% (21 Oct 2021 07:53) (91% - 95%)        Physical Exam:      Constitutional: frail looking  HEENT: NC/AT, EOMI, PERRLA, conjunctivae clear; ears and nose atraumatic; pharynx clear  Neck: supple; thyroid not palpable  Back: no tenderness  Respiratory: respiratory effort normal; clear to auscultation; bilateral rhonchi improved  Cardiovascular: S1S2 regular, no murmurs  Abdomen: soft, not tender, not distended, positive BS; no liver or spleen organomegaly  Genitourinary: no suprapubic tenderness  Musculoskeletal: no muscle tenderness, no joint swelling or tenderness  Neurological/ Psychiatric:  moving all extremities  Skin: no rashes; no palpable lesions    Labs: all available labs reviewed                       Labs:                   Labs:           Labs:                        11.1   6.59  )-----------( 242      ( 20 Oct 2021 07:12 )             34.6     10-20    137  |  103  |  25<H>  ----------------------------<  80  3.7   |  28  |  0.71    Ca    8.6      20 Oct 2021 07:12    TPro  6.1  /  Alb  2.3<L>  /  TBili  0.4  /  DBili  x   /  AST  28  /  ALT  34  /  AlkPhos  164<H>  10-20           Cultures:       Culture - Urine (collected 10-16-21 @ 12:29)  Source: Clean Catch None  Final Report (10-18-21 @ 16:39):    >100,000 CFU/ml Citrobacter amalonaticus complex  Organism: Citrobacter amalonaticus complex (10-18-21 @ 16:39)  Organism: Citrobacter amalonaticus complex (10-18-21 @ 16:39)      -  Amikacin: S <=16      -  Amoxicillin/Clavulanic Acid: S <=8/4      -  Ampicillin: R >16 These ampicillin results predict results for amoxicillin      -  Ampicillin/Sulbactam: S 8/4 Enterobacter, Citrobacter, and Serratia may develop resistance during prolonged therapy (3-4 days)      -  Aztreonam: S <=4      -  Cefazolin: R >16      -  Cefepime: S <=2      -  Cefoxitin: S <=8      -  Ceftriaxone: S <=1 Enterobacter, Citrobacter, and Serratia may develop resistance during prolonged therapy      -  Ciprofloxacin: S <=0.25      -  Ertapenem: S <=0.5      -  Gentamicin: S <=2      -  Imipenem: S <=1      -  Levofloxacin: S <=0.5      -  Meropenem: S <=1      -  Nitrofurantoin: I 64 Should not be used to treat pyelonephritis      -  Piperacillin/Tazobactam: S <=8      -  Tigecycline: S <=2      -  Tobramycin: S <=2      -  Trimethoprim/Sulfamethoxazole: S <=0.5/9.5      Method Type: JOANA    Culture - Blood (collected 10-16-21 @ 11:46)  Source: .Blood None  Preliminary Report (10-17-21 @ 20:01):    No growth to date.    Culture - Blood (collected 10-16-21 @ 11:46)  Source: .Blood None  Preliminary Report (10-17-21 @ 20:01):    No growth to date.              < from: Xray Chest 1 View- PORTABLE-Urgent (10.16.21 @ 12:56) >    IMPRESSION: There is bilateral lower lobe linear and patchy opacification is more prominent on the left compatible with atelectasis or pneumonia. The heart is normal in size. Degenerative changes are noted of the bones.    < end of copied text >      Radiology: all available radiological tests reviewed    Advanced directives addressed: full resuscitation

## 2021-10-21 NOTE — PROGRESS NOTE ADULT - SUBJECTIVE AND OBJECTIVE BOX
Patient seen and examined:   No acute events overnight.  No chest pain or sob. Voiding well  Tolerating po. Occasional cough.  No fever.    ROS: Negative except for above      Vital Signs Last 24 Hrs  T(C): 36.6 (21 Oct 2021 07:53), Max: 37 (20 Oct 2021 19:34)  T(F): 97.8 (21 Oct 2021 07:53), Max: 98.6 (20 Oct 2021 19:34)  HR: 83 (21 Oct 2021 07:53) (83 - 86)  BP: 116/54 (21 Oct 2021 07:53) (113/58 - 116/54)  BP(mean): --  RR: 18 (21 Oct 2021 07:53) (18 - 19)  SpO2: 95% (21 Oct 2021 07:53) (91% - 95%)    PHYSICAL EXAM:  Constitutional: NAD, awake and alert, thin  HEENT: PERR, EOMI, Normal Hearing, MMM  Neck: Soft and supple, No LAD, No JVD  Respiratory: Breath sounds are clear bilaterally, No wheezing, rales or rhonchi  Cardiovascular: S1 and S2, regular rate and rhythm, no Murmurs, gallops or rubs  Gastrointestinal: Bowel Sounds present, soft, nontender, nondistended, no guarding, no rebound  Extremities: No peripheral edema  Vascular: 2+ peripheral pulses  Neurological: A/O x 3, no focal deficits  Musculoskeletal: 5/5 strength b/l upper and lower extremities  Skin: No rashes    LABS: All Labs Reviewed:                        11.1   6.59  )-----------( 242      ( 20 Oct 2021 07:12 )             34.6     10-20    137  |  103  |  25<H>  ----------------------------<  80  3.7   |  28  |  0.71    Ca    8.6      20 Oct 2021 07:12    TPro  6.1  /  Alb  2.3<L>  /  TBili  0.4  /  DBili  x   /  AST  28  /  ALT  34  /  AlkPhos  164<H>  10-20          Blood Culture: 10-16 @ 12:29  Organism Citrobacter amalonaticus complex  Gram Stain Blood -- Gram Stain --  Specimen Source Clean Catch None  Culture-Blood --    10-16 @ 11:46  Organism --  Gram Stain Blood -- Gram Stain --  Specimen Source .Blood None  Culture-Blood --    Xray 10/20 Atelectasis

## 2021-10-21 NOTE — PHYSICAL THERAPY INITIAL EVALUATION ADULT - ASSISTIVE DEVICE FOR TRANSFER: GAIT, REHAB EVAL
[facility]  [facility]  [address]  [facility] [address]  [phone]      5/21/2019      To Whom It May Concern:      For your information, Delfina Harrington, had an appointment in our office today with:    Laura Yahr Nelson, MD     Sincerely,        Laura Yahr Nelson, MD   rolling walker

## 2021-10-21 NOTE — PHYSICAL THERAPY INITIAL EVALUATION ADULT - MANUAL MUSCLE TESTING RESULTS, REHAB EVAL
Bilateral UE strength 3+-4/5 throughout grossly. Right LE strength 3+/5 throughout grossly. Left LE strength 3/5 throughout grossly.

## 2021-10-21 NOTE — PHYSICAL THERAPY INITIAL EVALUATION ADULT - PERTINENT HX OF CURRENT PROBLEM, REHAB EVAL
Pt admitted to  from [facility] following fall. Pt with recent left femur fx s/p IMN 8/23/2021. CT head: neg. COVID 19: neg.

## 2021-10-21 NOTE — PROGRESS NOTE ADULT - ASSESSMENT
The patient is an 87 yo male with Hx. of COPD, Dementia, Tremor , Bradycardia, Scoliosis, osteoporosis ,Left femur fracture 8/23/21 underwent IMN , bradycardia,  was sent to the ER from [facility] after he fell in the facility.    1. Acute hypoxic Respiratory Failure secondary to Pneumonia/Sepsis    - Continue doxycycline and cefepime. ID Consult appreciated    - Repeat Chest xray shows atelectasis    - If DC home send on 5 days Ceftin and Doxycycline  2. Sinus Bradycardia- Resolved   - Episode of tachy/shanti in ER   - EP to eval, no evidence of ACS.   - Monitor Troponin- negative   - DC Propranolol  - Echo 55-60   - EP Consult appreciated   - HR now 70s  3. SP Fall   - no obvious trauma at this point   - Monitor for pain  - ? Due to bradycardia  4. HLD   - Continue Atorvastatin  5. DVT prophylaxis   - Continue Heparin  6. Essential Tremor  - continue Primidone, increase to 100 BID  - Increase Topamax 50 BID  - Reached out to his neurologist Dr. Zamora who is on vacation  - Will FU with Neurology for tx of ET  - DC Propranolol and monitor  7. UTI/Citrobacter   - Continue abx cefepime sensitive, switch to ceftin on DC

## 2021-10-21 NOTE — DISCHARGE NOTE PROVIDER - NSDCMRMEDTOKEN_GEN_ALL_CORE_FT
acetaminophen 325 mg oral tablet: 3 tab(s) orally every 8 hours - 3)  albuterol 90 mcg/inh inhalation aerosol: 1 puff(s) inhaled every 4 hours  Aricept 5 mg oral tablet: 1 tab(s) orally once a day (at bedtime)  atorvastatin 10 mg oral tablet: 1 tab(s) orally once a day (at bedtime)  Bacid (LAC) oral tablet: 1 tab(s) orally once a day  cefuroxime 500 mg oral tablet: 1 tab(s) orally every 12 hours for 5 days  doxycycline monohydrate 100 mg oral capsule: 1 cap(s) orally every 12 hours for 5 days  magnesium hydroxide 8% oral suspension: 30 milliliter(s) orally once a day, As needed, Constipation  melatonin 3 mg oral tablet: 1 tab(s) orally once a day (at bedtime), As needed, Insomnia  omeprazole 40 mg oral delayed release capsule: 1 cap(s) orally once a day  primidone 50 mg oral tablet: 2 tab(s) orally 2 times a day  senna oral tablet: 2 tab(s) orally once a day (at bedtime)  tamsulosin 0.4 mg oral capsule: 1 cap(s) orally once a day  timolol hemihydrate 0.5% ophthalmic solution: 1 drop(s) to each affected eye once a day  topiramate 50 mg oral tablet: 1 tab(s) orally 2 times a day  Xalatan 0.005% ophthalmic solution: 1 drop(s) to each affected eye once (at bedtime)

## 2021-10-21 NOTE — DISCHARGE NOTE PROVIDER - HOSPITAL COURSE
The patient is an 87 yo male with Hx. of COPD, Dementia, Tremor , Bradycardia, Scoliosis, osteoporosis ,Left femur fracture 8/23/21 underwent IMN , bradycardia,  was sent to the ER from [facility] after he fell in the facility.    1. Acute hypoxic Respiratory Failure secondary to Pneumonia/Sepsis    - Continue doxycycline and cefepime. ID Consult appreciated    - Repeat Chest xray shows atelectasis    - If DC home send on 5 days Ceftin and Doxycycline  2. Sinus Bradycardia- Resolved   - Episode of tachy/shanti in ER   - EP to eval, no evidence of ACS.   - Monitor Troponin- negative   - DC Propranolol  - Echo 55-60   - EP Consult appreciated   - HR now 70s  3. SP Fall   - no obvious trauma at this point   - Monitor for pain  - ? Due to bradycardia  4. HLD   - Continue Atorvastatin  5. DVT prophylaxis   - Continue Heparin  6. Essential Tremor  - continue Primidone, increase to 100 BID  - Increase Topamax 50 BID  - Reached out to his neurologist Dr. Zamora who is on vacation  - Will FU with Neurology for tx of ET  - DC Propranolol and monitor  7. UTI/Citrobacter   - Continue abx cefepime sensitive, switch to ceftin    The patient is an 87 yo male with Hx. of COPD, Dementia, Tremor , Bradycardia, Scoliosis, osteoporosis ,Left femur fracture 8/23/21 underwent IMN , bradycardia,  was sent to the ER from [facility] after he fell in the facility.    1. Acute hypoxic Respiratory Failure secondary to Pneumonia/Sepsis    - Continue doxycycline and cefepime. ID Consult appreciated    - Repeat Chest xray shows atelectasis    - If DC home send on 5 days Ceftin and Doxycycline  2. Sinus Bradycardia- Resolved   - Episode of tachy/shanti in ER   - EP to eval, no evidence of ACS.   - Monitor Troponin- negative   - DC Propranolol  - Echo 55-60   - EP Consult appreciated   - HR now 70s  - Patient with Zio Patch, please remove and mail back in prepaid postage box  3. SP Fall   - no obvious trauma at this point   - Monitor for pain  - ? Due to bradycardia  4. HLD   - Continue Atorvastatin  5. DVT prophylaxis   - Continue Heparin  6. Essential Tremor  - continue Primidone, increase to 100 BID  - Increase Topamax 50 BID  - Reached out to his neurologist Dr. Zamora who is on vacation  - Will FU with Neurology for tx of ET  - DC Propranolol and monitor  7. UTI/Citrobacter   - Continue abx cefepime sensitive, switch to ceftin

## 2021-10-21 NOTE — PHYSICAL THERAPY INITIAL EVALUATION ADULT - ACTIVE RANGE OF MOTION EXAMINATION, REHAB EVAL
Bilateral shoulder flex ~ 120 degrees. Right LE WNL except for DF neutral. Left hip flex ~ 70 degrees and DF neutral./Left UE Active ROM was WFL (within functional limits)/Right UE Active ROM was WFL (within functional limits)/Left LE Active ROM was WFL (within functional limits)/Right LE Active ROM was WFL (within functional limits)

## 2021-10-21 NOTE — PROGRESS NOTE ADULT - ASSESSMENT
The patient is an 87 yo male with Hx. of COPD, Dementia, Tremor , Bradycardia, Scoliosis, osteoporosis ,Left femur fracture 8/23/21 underwent IMN , bradycardia,  admitted from [facility] on 10/16 for evaluation after a fall; patient cannot provide any details and history per medical record.     1. Patient admitted with pneumonia, also noted with leukocytosis which most likely is reactive to infection; patient at risk for gram negative rods and other resistant bacteria   - follow up cultures   - serial cbc and monitor temperature   - reviewed prior medical records to evaluate for resistant or atypical pathogens   - oxygen and nebs as needed   - iv hydration and supportive care   - day # 5 cefepime and doxycycline; okay from id standpoint to discharge on 5 more days ceftin and doxycycline  - tolerating antibiotics without rashes or side effects   - has gram negative rods in urine, Citrobacter and is sensitive to cefepime  2. other issues; per medicine

## 2021-10-21 NOTE — PROGRESS NOTE ADULT - PROVIDER SPECIALTY LIST ADULT
Infectious Disease
Electrophysiology
Hospitalist
Infectious Disease
Infectious Disease
Hospitalist
Electrophysiology
Hospitalist
Infectious Disease

## 2021-10-21 NOTE — DISCHARGE NOTE NURSING/CASE MANAGEMENT/SOCIAL WORK - PATIENT PORTAL LINK FT
You can access the FollowMyHealth Patient Portal offered by [facility] by registering at the following website: [email or web address]. By joining [facility]’s FollowMyHealth portal, you will also be able to view your health information using other applications (apps) compatible with our system.

## 2021-10-21 NOTE — PROGRESS NOTE ADULT - REASON FOR ADMISSION
Chest pain, s/p fall, UTI

## 2021-10-28 DIAGNOSIS — Z98.890 OTHER SPECIFIED POSTPROCEDURAL STATES: ICD-10-CM

## 2021-10-28 DIAGNOSIS — F03.90 UNSPECIFIED DEMENTIA WITHOUT BEHAVIORAL DISTURBANCE: ICD-10-CM

## 2021-10-28 DIAGNOSIS — E78.5 HYPERLIPIDEMIA, UNSPECIFIED: ICD-10-CM

## 2021-10-28 DIAGNOSIS — Z91.81 HISTORY OF FALLING: ICD-10-CM

## 2021-10-28 DIAGNOSIS — E73.9 LACTOSE INTOLERANCE, UNSPECIFIED: ICD-10-CM

## 2021-10-28 DIAGNOSIS — N39.0 URINARY TRACT INFECTION, SITE NOT SPECIFIED: ICD-10-CM

## 2021-10-28 DIAGNOSIS — Z90.49 ACQUIRED ABSENCE OF OTHER SPECIFIED PARTS OF DIGESTIVE TRACT: ICD-10-CM

## 2021-10-28 DIAGNOSIS — R25.1 TREMOR, UNSPECIFIED: ICD-10-CM

## 2021-10-28 DIAGNOSIS — A41.9 SEPSIS, UNSPECIFIED ORGANISM: ICD-10-CM

## 2021-10-28 DIAGNOSIS — J96.01 ACUTE RESPIRATORY FAILURE WITH HYPOXIA: ICD-10-CM

## 2021-10-28 DIAGNOSIS — M81.0 AGE-RELATED OSTEOPOROSIS WITHOUT CURRENT PATHOLOGICAL FRACTURE: ICD-10-CM

## 2021-10-28 DIAGNOSIS — J18.9 PNEUMONIA, UNSPECIFIED ORGANISM: ICD-10-CM

## 2021-10-28 DIAGNOSIS — B96.89 OTHER SPECIFIED BACTERIAL AGENTS AS THE CAUSE OF DISEASES CLASSIFIED ELSEWHERE: ICD-10-CM

## 2021-10-28 DIAGNOSIS — Z88.1 ALLERGY STATUS TO OTHER ANTIBIOTIC AGENTS STATUS: ICD-10-CM

## 2021-10-28 DIAGNOSIS — R00.1 BRADYCARDIA, UNSPECIFIED: ICD-10-CM

## 2021-10-28 DIAGNOSIS — M41.9 SCOLIOSIS, UNSPECIFIED: ICD-10-CM

## 2021-10-28 DIAGNOSIS — J44.0 CHRONIC OBSTRUCTIVE PULMONARY DISEASE WITH (ACUTE) LOWER RESPIRATORY INFECTION: ICD-10-CM

## 2021-12-02 ENCOUNTER — NON-APPOINTMENT (OUTPATIENT)
Age: 86
End: 2021-12-02

## 2021-12-03 ENCOUNTER — APPOINTMENT (OUTPATIENT)
Dept: ELECTROPHYSIOLOGY | Facility: CLINIC | Age: 86
End: 2021-12-03

## 2021-12-14 ENCOUNTER — EMERGENCY (EMERGENCY)
Facility: HOSPITAL | Age: 86
LOS: 0 days | Discharge: HOME | End: 2021-12-14
Attending: STUDENT IN AN ORGANIZED HEALTH CARE EDUCATION/TRAINING PROGRAM
Payer: MEDICARE

## 2021-12-14 VITALS
HEIGHT: 70 IN | WEIGHT: 139.99 LBS | RESPIRATION RATE: 20 BRPM | TEMPERATURE: 99 F | SYSTOLIC BLOOD PRESSURE: 124 MMHG | DIASTOLIC BLOOD PRESSURE: 71 MMHG | HEART RATE: 100 BPM | OXYGEN SATURATION: 92 %

## 2021-12-14 VITALS
RESPIRATION RATE: 15 BRPM | SYSTOLIC BLOOD PRESSURE: 129 MMHG | OXYGEN SATURATION: 96 % | HEART RATE: 62 BPM | DIASTOLIC BLOOD PRESSURE: 49 MMHG

## 2021-12-14 DIAGNOSIS — E78.5 HYPERLIPIDEMIA, UNSPECIFIED: ICD-10-CM

## 2021-12-14 DIAGNOSIS — Z90.49 ACQUIRED ABSENCE OF OTHER SPECIFIED PARTS OF DIGESTIVE TRACT: Chronic | ICD-10-CM

## 2021-12-14 DIAGNOSIS — R19.7 DIARRHEA, UNSPECIFIED: ICD-10-CM

## 2021-12-14 DIAGNOSIS — Z88.1 ALLERGY STATUS TO OTHER ANTIBIOTIC AGENTS STATUS: ICD-10-CM

## 2021-12-14 DIAGNOSIS — F03.90 UNSPECIFIED DEMENTIA WITHOUT BEHAVIORAL DISTURBANCE: ICD-10-CM

## 2021-12-14 DIAGNOSIS — Z91.011 ALLERGY TO MILK PRODUCTS: ICD-10-CM

## 2021-12-14 DIAGNOSIS — R11.2 NAUSEA WITH VOMITING, UNSPECIFIED: ICD-10-CM

## 2021-12-14 DIAGNOSIS — N39.0 URINARY TRACT INFECTION, SITE NOT SPECIFIED: ICD-10-CM

## 2021-12-14 LAB
ALBUMIN SERPL ELPH-MCNC: 2.3 G/DL — LOW (ref 3.3–5)
ALP SERPL-CCNC: 142 U/L — HIGH (ref 40–120)
ALT FLD-CCNC: 39 U/L — SIGNIFICANT CHANGE UP (ref 12–78)
ANION GAP SERPL CALC-SCNC: 6 MMOL/L — SIGNIFICANT CHANGE UP (ref 5–17)
APPEARANCE UR: ABNORMAL
APTT BLD: 31.4 SEC — SIGNIFICANT CHANGE UP (ref 27.5–35.5)
AST SERPL-CCNC: 32 U/L — SIGNIFICANT CHANGE UP (ref 15–37)
BASOPHILS # BLD AUTO: 0 K/UL — SIGNIFICANT CHANGE UP (ref 0–0.2)
BASOPHILS NFR BLD AUTO: 0 % — SIGNIFICANT CHANGE UP (ref 0–2)
BILIRUB SERPL-MCNC: 0.3 MG/DL — SIGNIFICANT CHANGE UP (ref 0.2–1.2)
BILIRUB UR-MCNC: NEGATIVE — SIGNIFICANT CHANGE UP
BUN SERPL-MCNC: 30 MG/DL — HIGH (ref 7–23)
CALCIUM SERPL-MCNC: 8.6 MG/DL — SIGNIFICANT CHANGE UP (ref 8.5–10.1)
CHLORIDE SERPL-SCNC: 113 MMOL/L — HIGH (ref 96–108)
CO2 SERPL-SCNC: 23 MMOL/L — SIGNIFICANT CHANGE UP (ref 22–31)
COLOR SPEC: YELLOW — SIGNIFICANT CHANGE UP
CREAT SERPL-MCNC: 0.88 MG/DL — SIGNIFICANT CHANGE UP (ref 0.5–1.3)
DIFF PNL FLD: ABNORMAL
EOSINOPHIL # BLD AUTO: 0 K/UL — SIGNIFICANT CHANGE UP (ref 0–0.5)
EOSINOPHIL NFR BLD AUTO: 0 % — SIGNIFICANT CHANGE UP (ref 0–6)
GLUCOSE SERPL-MCNC: 110 MG/DL — HIGH (ref 70–99)
GLUCOSE UR QL: NEGATIVE MG/DL — SIGNIFICANT CHANGE UP
HCT VFR BLD CALC: 41.9 % — SIGNIFICANT CHANGE UP (ref 39–50)
HGB BLD-MCNC: 13 G/DL — SIGNIFICANT CHANGE UP (ref 13–17)
INR BLD: 1.15 RATIO — SIGNIFICANT CHANGE UP (ref 0.88–1.16)
KETONES UR-MCNC: NEGATIVE — SIGNIFICANT CHANGE UP
LACTATE SERPL-SCNC: 1.1 MMOL/L — SIGNIFICANT CHANGE UP (ref 0.7–2)
LEUKOCYTE ESTERASE UR-ACNC: ABNORMAL
LIDOCAIN IGE QN: 363 U/L — SIGNIFICANT CHANGE UP (ref 73–393)
LYMPHOCYTES # BLD AUTO: 0.39 K/UL — LOW (ref 1–3.3)
LYMPHOCYTES # BLD AUTO: 4 % — LOW (ref 13–44)
MCHC RBC-ENTMCNC: 28.8 PG — SIGNIFICANT CHANGE UP (ref 27–34)
MCHC RBC-ENTMCNC: 31 GM/DL — LOW (ref 32–36)
MCV RBC AUTO: 92.7 FL — SIGNIFICANT CHANGE UP (ref 80–100)
MONOCYTES # BLD AUTO: 0.2 K/UL — SIGNIFICANT CHANGE UP (ref 0–0.9)
MONOCYTES NFR BLD AUTO: 2 % — SIGNIFICANT CHANGE UP (ref 2–14)
NEUTROPHILS # BLD AUTO: 9.2 K/UL — HIGH (ref 1.8–7.4)
NEUTROPHILS NFR BLD AUTO: 89 % — HIGH (ref 43–77)
NITRITE UR-MCNC: POSITIVE
NRBC # BLD: SIGNIFICANT CHANGE UP /100 WBCS (ref 0–0)
PH UR: 5 — SIGNIFICANT CHANGE UP (ref 5–8)
PLATELET # BLD AUTO: 302 K/UL — SIGNIFICANT CHANGE UP (ref 150–400)
POTASSIUM SERPL-MCNC: 4 MMOL/L — SIGNIFICANT CHANGE UP (ref 3.5–5.3)
POTASSIUM SERPL-SCNC: 4 MMOL/L — SIGNIFICANT CHANGE UP (ref 3.5–5.3)
PROT SERPL-MCNC: 6.9 GM/DL — SIGNIFICANT CHANGE UP (ref 6–8.3)
PROT UR-MCNC: 30 MG/DL
PROTHROM AB SERPL-ACNC: 13.3 SEC — SIGNIFICANT CHANGE UP (ref 10.6–13.6)
RAPID RVP RESULT: SIGNIFICANT CHANGE UP
RBC # BLD: 4.52 M/UL — SIGNIFICANT CHANGE UP (ref 4.2–5.8)
RBC # FLD: 14.4 % — SIGNIFICANT CHANGE UP (ref 10.3–14.5)
SARS-COV-2 RNA SPEC QL NAA+PROBE: SIGNIFICANT CHANGE UP
SODIUM SERPL-SCNC: 142 MMOL/L — SIGNIFICANT CHANGE UP (ref 135–145)
SP GR SPEC: 1.01 — SIGNIFICANT CHANGE UP (ref 1.01–1.02)
UROBILINOGEN FLD QL: NEGATIVE MG/DL — SIGNIFICANT CHANGE UP
WBC # BLD: 9.79 K/UL — SIGNIFICANT CHANGE UP (ref 3.8–10.5)
WBC # FLD AUTO: 9.79 K/UL — SIGNIFICANT CHANGE UP (ref 3.8–10.5)

## 2021-12-14 PROCEDURE — 70450 CT HEAD/BRAIN W/O DYE: CPT | Mod: 26,MA

## 2021-12-14 PROCEDURE — 99284 EMERGENCY DEPT VISIT MOD MDM: CPT | Mod: 25

## 2021-12-14 PROCEDURE — 74177 CT ABD & PELVIS W/CONTRAST: CPT | Mod: MA

## 2021-12-14 PROCEDURE — 85610 PROTHROMBIN TIME: CPT

## 2021-12-14 PROCEDURE — 93005 ELECTROCARDIOGRAM TRACING: CPT

## 2021-12-14 PROCEDURE — 71045 X-RAY EXAM CHEST 1 VIEW: CPT | Mod: 26

## 2021-12-14 PROCEDURE — 80053 COMPREHEN METABOLIC PANEL: CPT

## 2021-12-14 PROCEDURE — 36415 COLL VENOUS BLD VENIPUNCTURE: CPT

## 2021-12-14 PROCEDURE — 96375 TX/PRO/DX INJ NEW DRUG ADDON: CPT

## 2021-12-14 PROCEDURE — 83690 ASSAY OF LIPASE: CPT

## 2021-12-14 PROCEDURE — 74177 CT ABD & PELVIS W/CONTRAST: CPT | Mod: 26,MA

## 2021-12-14 PROCEDURE — 81001 URINALYSIS AUTO W/SCOPE: CPT

## 2021-12-14 PROCEDURE — 71045 X-RAY EXAM CHEST 1 VIEW: CPT

## 2021-12-14 PROCEDURE — 85730 THROMBOPLASTIN TIME PARTIAL: CPT

## 2021-12-14 PROCEDURE — 0225U NFCT DS DNA&RNA 21 SARSCOV2: CPT

## 2021-12-14 PROCEDURE — 96374 THER/PROPH/DIAG INJ IV PUSH: CPT | Mod: XU

## 2021-12-14 PROCEDURE — 87077 CULTURE AEROBIC IDENTIFY: CPT

## 2021-12-14 PROCEDURE — 70450 CT HEAD/BRAIN W/O DYE: CPT | Mod: MA

## 2021-12-14 PROCEDURE — 99284 EMERGENCY DEPT VISIT MOD MDM: CPT

## 2021-12-14 PROCEDURE — 85025 COMPLETE CBC W/AUTO DIFF WBC: CPT

## 2021-12-14 PROCEDURE — 93010 ELECTROCARDIOGRAM REPORT: CPT

## 2021-12-14 PROCEDURE — 87040 BLOOD CULTURE FOR BACTERIA: CPT | Mod: 91

## 2021-12-14 PROCEDURE — 87186 SC STD MICRODIL/AGAR DIL: CPT

## 2021-12-14 PROCEDURE — 87086 URINE CULTURE/COLONY COUNT: CPT

## 2021-12-14 PROCEDURE — 83605 ASSAY OF LACTIC ACID: CPT

## 2021-12-14 RX ORDER — ONDANSETRON 8 MG/1
4 TABLET, FILM COATED ORAL ONCE
Refills: 0 | Status: COMPLETED | OUTPATIENT
Start: 2021-12-14 | End: 2021-12-14

## 2021-12-14 RX ORDER — SODIUM CHLORIDE 9 MG/ML
2300 INJECTION INTRAMUSCULAR; INTRAVENOUS; SUBCUTANEOUS ONCE
Refills: 0 | Status: COMPLETED | OUTPATIENT
Start: 2021-12-14 | End: 2021-12-14

## 2021-12-14 RX ORDER — CEFUROXIME AXETIL 250 MG
1 TABLET ORAL
Qty: 14 | Refills: 0
Start: 2021-12-14 | End: 2021-12-20

## 2021-12-14 RX ORDER — PIPERACILLIN AND TAZOBACTAM 4; .5 G/20ML; G/20ML
3.38 INJECTION, POWDER, LYOPHILIZED, FOR SOLUTION INTRAVENOUS ONCE
Refills: 0 | Status: COMPLETED | OUTPATIENT
Start: 2021-12-14 | End: 2021-12-14

## 2021-12-14 RX ORDER — ACETAMINOPHEN 500 MG
650 TABLET ORAL ONCE
Refills: 0 | Status: COMPLETED | OUTPATIENT
Start: 2021-12-14 | End: 2021-12-14

## 2021-12-14 RX ADMIN — PIPERACILLIN AND TAZOBACTAM 200 GRAM(S): 4; .5 INJECTION, POWDER, LYOPHILIZED, FOR SOLUTION INTRAVENOUS at 15:25

## 2021-12-14 RX ADMIN — ONDANSETRON 4 MILLIGRAM(S): 8 TABLET, FILM COATED ORAL at 17:07

## 2021-12-14 RX ADMIN — SODIUM CHLORIDE 2300 MILLILITER(S): 9 INJECTION INTRAMUSCULAR; INTRAVENOUS; SUBCUTANEOUS at 15:25

## 2021-12-14 RX ADMIN — Medication 650 MILLIGRAM(S): at 15:25

## 2021-12-14 NOTE — ED ADULT TRIAGE NOTE - CHIEF COMPLAINT QUOTE
BIBA to ED from [facility] for c/o nausea, vomiting, diarrhea, and fever since yesterday. EMS reports that a "nug" in the facility is going around. EMS reports that staff states that PT is fatigued. PT alert and responsive.

## 2021-12-14 NOTE — ED PROVIDER NOTE - PROGRESS NOTE DETAILS
[facility] DO: Patient found to have uti; bladder diverticulum, stone unchanged from old report; patient given IV antibiotics, IVF; SW spoke to facility and patient to be checked every two hours at facility and wife in agreement with plan at this time; rx for antibiotics for uti sent; tolerated po; instructed to f/u with pmd in 1 day without fail; strict return precautions given.

## 2021-12-14 NOTE — ED PROVIDER NOTE - PATIENT PORTAL LINK FT
You can access the FollowMyHealth Patient Portal offered by [facility] by registering at the following website: [email or web address]. By joining [facility]’s FollowMyHealth portal, you will also be able to view your health information using other applications (apps) compatible with our system.

## 2021-12-14 NOTE — ED ADULT NURSE NOTE - CHIEF COMPLAINT QUOTE
BIBA to ED from [facility] for c/o nausea, vomiting, diarrhea, and fever since yesterday. EMS reports that a "nug" in the facility is going around. EMS reports that staff states that PT is fatigued. PT alert and responsive.

## 2021-12-14 NOTE — ED ADULT NURSE NOTE - OBJECTIVE STATEMENT
patient was sent by assisting living home for nausea, vomiting, diarrhea x 2 days; with tmax: 102; ?altered mental status per transfer paperwork though patient with hxo dementia; patient offers no complaints though hx limited 2/2 to patient's dementia;

## 2021-12-14 NOTE — ED PROVIDER NOTE - CLINICAL SUMMARY MEDICAL DECISION MAKING FREE TEXT BOX
87 yo male with hx of dementia, nausea, vomiting, diarrhea, fever x 2 days; ekg, cxr, labs, ct imaging; urine; re-eval

## 2021-12-14 NOTE — ED PROVIDER NOTE - OBJECTIVE STATEMENT
Patient is a 87 yo male with hx of dementia; oriented to person, place not time in ED; patient was sent by assisting living home for nausea, vomiting, diarrhea x 2 days; with tmax: 102; ?altered mental status per transfer paperwork though patient with hxo dementia; patient offers no complaints though hx limited 2/2 to patient's dementia; per EMS- "GI bug" going around the living facility.

## 2021-12-16 LAB
-  AMIKACIN: SIGNIFICANT CHANGE UP
-  AMIKACIN: SIGNIFICANT CHANGE UP
-  AMOXICILLIN/CLAVULANIC ACID: SIGNIFICANT CHANGE UP
-  AMOXICILLIN/CLAVULANIC ACID: SIGNIFICANT CHANGE UP
-  AMPICILLIN/SULBACTAM: SIGNIFICANT CHANGE UP
-  AMPICILLIN/SULBACTAM: SIGNIFICANT CHANGE UP
-  AMPICILLIN: SIGNIFICANT CHANGE UP
-  AMPICILLIN: SIGNIFICANT CHANGE UP
-  AZTREONAM: SIGNIFICANT CHANGE UP
-  AZTREONAM: SIGNIFICANT CHANGE UP
-  CEFAZOLIN: SIGNIFICANT CHANGE UP
-  CEFAZOLIN: SIGNIFICANT CHANGE UP
-  CEFEPIME: SIGNIFICANT CHANGE UP
-  CEFEPIME: SIGNIFICANT CHANGE UP
-  CEFOXITIN: SIGNIFICANT CHANGE UP
-  CEFOXITIN: SIGNIFICANT CHANGE UP
-  CEFTRIAXONE: SIGNIFICANT CHANGE UP
-  CEFTRIAXONE: SIGNIFICANT CHANGE UP
-  CIPROFLOXACIN: SIGNIFICANT CHANGE UP
-  CIPROFLOXACIN: SIGNIFICANT CHANGE UP
-  ERTAPENEM: SIGNIFICANT CHANGE UP
-  ERTAPENEM: SIGNIFICANT CHANGE UP
-  GENTAMICIN: SIGNIFICANT CHANGE UP
-  GENTAMICIN: SIGNIFICANT CHANGE UP
-  IMIPENEM: SIGNIFICANT CHANGE UP
-  IMIPENEM: SIGNIFICANT CHANGE UP
-  LEVOFLOXACIN: SIGNIFICANT CHANGE UP
-  LEVOFLOXACIN: SIGNIFICANT CHANGE UP
-  MEROPENEM: SIGNIFICANT CHANGE UP
-  MEROPENEM: SIGNIFICANT CHANGE UP
-  NITROFURANTOIN: SIGNIFICANT CHANGE UP
-  NITROFURANTOIN: SIGNIFICANT CHANGE UP
-  PIPERACILLIN/TAZOBACTAM: SIGNIFICANT CHANGE UP
-  PIPERACILLIN/TAZOBACTAM: SIGNIFICANT CHANGE UP
-  TIGECYCLINE: SIGNIFICANT CHANGE UP
-  TIGECYCLINE: SIGNIFICANT CHANGE UP
-  TOBRAMYCIN: SIGNIFICANT CHANGE UP
-  TOBRAMYCIN: SIGNIFICANT CHANGE UP
-  TRIMETHOPRIM/SULFAMETHOXAZOLE: SIGNIFICANT CHANGE UP
-  TRIMETHOPRIM/SULFAMETHOXAZOLE: SIGNIFICANT CHANGE UP
CULTURE RESULTS: SIGNIFICANT CHANGE UP
METHOD TYPE: SIGNIFICANT CHANGE UP
METHOD TYPE: SIGNIFICANT CHANGE UP
ORGANISM # SPEC MICROSCOPIC CNT: SIGNIFICANT CHANGE UP
SPECIMEN SOURCE: SIGNIFICANT CHANGE UP

## 2022-01-01 NOTE — ED PROVIDER NOTE - NS_ATTENDINGSCRIBE_ED_ALL_ED
(2) cough or sneeze I personally performed the service described in the documentation recorded by the scribe in my presence, and it accurately and completely records my words and actions.
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
## 2022-12-13 ENCOUNTER — EMERGENCY (EMERGENCY)
Facility: HOSPITAL | Age: 87
LOS: 0 days | Discharge: HOME | End: 2022-12-13
Attending: STUDENT IN AN ORGANIZED HEALTH CARE EDUCATION/TRAINING PROGRAM
Payer: MEDICARE

## 2022-12-13 VITALS
DIASTOLIC BLOOD PRESSURE: 74 MMHG | OXYGEN SATURATION: 99 % | TEMPERATURE: 98 F | RESPIRATION RATE: 20 BRPM | HEART RATE: 69 BPM | WEIGHT: 169.98 LBS | HEIGHT: 66 IN | SYSTOLIC BLOOD PRESSURE: 141 MMHG

## 2022-12-13 VITALS
OXYGEN SATURATION: 94 % | RESPIRATION RATE: 18 BRPM | DIASTOLIC BLOOD PRESSURE: 59 MMHG | SYSTOLIC BLOOD PRESSURE: 147 MMHG | HEART RATE: 62 BPM | TEMPERATURE: 98 F

## 2022-12-13 DIAGNOSIS — Z90.49 ACQUIRED ABSENCE OF OTHER SPECIFIED PARTS OF DIGESTIVE TRACT: Chronic | ICD-10-CM

## 2022-12-13 PROCEDURE — 70450 CT HEAD/BRAIN W/O DYE: CPT | Mod: 26,MA

## 2022-12-13 PROCEDURE — 72125 CT NECK SPINE W/O DYE: CPT | Mod: MA

## 2022-12-13 PROCEDURE — 72125 CT NECK SPINE W/O DYE: CPT | Mod: 26,MA

## 2022-12-13 PROCEDURE — 99284 EMERGENCY DEPT VISIT MOD MDM: CPT

## 2022-12-13 PROCEDURE — 93010 ELECTROCARDIOGRAM REPORT: CPT

## 2022-12-13 PROCEDURE — 99284 EMERGENCY DEPT VISIT MOD MDM: CPT | Mod: 25

## 2022-12-13 PROCEDURE — 93005 ELECTROCARDIOGRAM TRACING: CPT

## 2022-12-13 PROCEDURE — 70450 CT HEAD/BRAIN W/O DYE: CPT | Mod: MA

## 2022-12-13 RX ORDER — DONEPEZIL HYDROCHLORIDE 10 MG/1
1 TABLET, FILM COATED ORAL
Qty: 0 | Refills: 0 | DISCHARGE

## 2022-12-13 RX ORDER — TAMSULOSIN HYDROCHLORIDE 0.4 MG/1
1 CAPSULE ORAL
Qty: 0 | Refills: 0 | DISCHARGE

## 2022-12-13 RX ORDER — LACTOBACILLUS ACIDOPHILUS 100MM CELL
1 CAPSULE ORAL
Qty: 0 | Refills: 0 | DISCHARGE

## 2022-12-13 RX ORDER — TIMOLOL 0.5 %
1 DROPS OPHTHALMIC (EYE)
Qty: 0 | Refills: 0 | DISCHARGE

## 2022-12-13 RX ORDER — OMEPRAZOLE 10 MG/1
1 CAPSULE, DELAYED RELEASE ORAL
Qty: 0 | Refills: 0 | DISCHARGE

## 2022-12-13 RX ORDER — CEFUROXIME AXETIL 250 MG
1 TABLET ORAL
Qty: 0 | Refills: 0 | DISCHARGE

## 2022-12-13 NOTE — ED ADULT TRIAGE NOTE - ISOLATION TYPE:
Spoke with pt, states her blood pressure was 160/96 yesterday right before her surgery. Today it was 140/80. States she feels fine. She is not on any blood pressure medications but had been on them in the past when her weight was up. Pt advised to track blood pressure over the weekend and if it continues to be elevated and she doesn't feel well she would go to [facility] or urgent care. Pt has office visit 9/16/2020 at 7:40 Am . Pt has appointment 9/16/2020 and was offered a couple earlier appointments but could not take them because she is just starting back to work. Please advise if you agree with recommendation or if you have other suggestions for pt.    None

## 2022-12-13 NOTE — PHARMACOTHERAPY INTERVENTION NOTE - COMMENTS
Medication reconciliation completed.  Reviewed Medication list and confirmed med allergies with list from Care Facility "[facility]"; confirmed with Dr. First Medamalia.

## 2022-12-13 NOTE — ED PROVIDER NOTE - CLINICAL SUMMARY MEDICAL DECISION MAKING FREE TEXT BOX
88 yo male s/p mechanical fall; no complaints; ct head/c-spine as patient unsure if he hit his head; ekg; re-eval closely

## 2022-12-13 NOTE — ED PROVIDER NOTE - PATIENT PORTAL LINK FT
You can access the FollowMyHealth Patient Portal offered by [facility] by registering at the following website: [email or web address]. By joining [employer]’s FollowMyHealth portal, you will also be able to view your health information using other applications (apps) compatible with our system.

## 2022-12-13 NOTE — ED ADULT NURSE NOTE - OBJECTIVE STATEMENT
sp unwitnessed fall in am at [facility], Patient went to go put pants on when he feel and hit his head. -blood thinners, -LOC. denies pain.

## 2022-12-13 NOTE — ED PROVIDER NOTE - PROGRESS NOTE DETAILS
Freedom MARK: I spoke to wife- comfortable with d/c back to facility aware of all results; transfer paperwork reports that patient had ?foot pain and arm pain- no complaints; patient ambulated with walker with steady gait (uses walker at facility); patient to go back to facility; strict return precautions given to wife at bedside.
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
## 2022-12-13 NOTE — ED ADULT NURSE NOTE - CHIEF COMPLAINT QUOTE
Patient BIBEMS from [facility] s/p unwitnessed fall around 545am. As per EMS, staff last saw him at 3 am. Patient went to go put pants on when he feel and hit his head. -blood thinners, -LOC. Endorses left foot pain. MD Torres made aware, NA called at [phone]

## 2022-12-13 NOTE — ED PROVIDER NOTE - OBJECTIVE STATEMENT
Patient is a 88 yo male s/p mechanical fall; patient was going to put his pants on this am when he tripped; ?striking head on ground; no loc; called for help at facility; no prolonged period on the ground; patient offers no complaints at this time; no dizziness or lightheadedness prior to fall; no chest pain; no sob; no abdominal pain; no weakness in arms or legs.

## 2022-12-13 NOTE — ED PROVIDER NOTE - CONSTITUTIONAL, MLM
normal... Well appearing, awake, alert, oriented to person, place, not time and in no apparent distress.

## 2022-12-14 DIAGNOSIS — J44.9 CHRONIC OBSTRUCTIVE PULMONARY DISEASE, UNSPECIFIED: ICD-10-CM

## 2022-12-14 DIAGNOSIS — M41.9 SCOLIOSIS, UNSPECIFIED: ICD-10-CM

## 2022-12-14 DIAGNOSIS — S09.90XA UNSPECIFIED INJURY OF HEAD, INITIAL ENCOUNTER: ICD-10-CM
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
## 2022-12-14 DIAGNOSIS — R41.3 OTHER AMNESIA: ICD-10-CM
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
## 2022-12-14 DIAGNOSIS — Z88.1 ALLERGY STATUS TO OTHER ANTIBIOTIC AGENTS STATUS: ICD-10-CM
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
## 2022-12-14 DIAGNOSIS — M81.0 AGE-RELATED OSTEOPOROSIS WITHOUT CURRENT PATHOLOGICAL FRACTURE: ICD-10-CM
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
## 2022-12-14 DIAGNOSIS — E78.5 HYPERLIPIDEMIA, UNSPECIFIED: ICD-10-CM

## 2022-12-14 DIAGNOSIS — Z91.011 ALLERGY TO MILK PRODUCTS: ICD-10-CM

## 2022-12-14 DIAGNOSIS — F03.90 UNSPECIFIED DEMENTIA WITHOUT BEHAVIORAL DISTURBANCE: ICD-10-CM

## 2023-10-11 ENCOUNTER — INPATIENT (INPATIENT)
Facility: HOSPITAL | Age: 88
LOS: 12 days | Discharge: NURSING, REHAB OR LONG-TERM CARE | DRG: 177 | End: 2023-10-24
Attending: INTERNAL MEDICINE | Admitting: HOSPITALIST
Payer: MEDICARE

## 2023-10-11 VITALS
TEMPERATURE: 98 F | OXYGEN SATURATION: 96 % | DIASTOLIC BLOOD PRESSURE: 60 MMHG | RESPIRATION RATE: 19 BRPM | HEART RATE: 63 BPM | SYSTOLIC BLOOD PRESSURE: 150 MMHG

## 2023-10-11 DIAGNOSIS — J18.9 PNEUMONIA, UNSPECIFIED ORGANISM: ICD-10-CM

## 2023-10-11 DIAGNOSIS — Z90.49 ACQUIRED ABSENCE OF OTHER SPECIFIED PARTS OF DIGESTIVE TRACT: Chronic | ICD-10-CM

## 2023-10-11 LAB
ALBUMIN SERPL ELPH-MCNC: 3.2 G/DL — LOW (ref 3.3–5)
ALP SERPL-CCNC: 144 U/L — HIGH (ref 40–120)
ALT FLD-CCNC: 27 U/L — SIGNIFICANT CHANGE UP (ref 12–78)
ANION GAP SERPL CALC-SCNC: 3 MMOL/L — LOW (ref 5–17)
APTT BLD: 31.9 SEC — SIGNIFICANT CHANGE UP (ref 24.5–35.6)
AST SERPL-CCNC: 15 U/L — SIGNIFICANT CHANGE UP (ref 15–37)
BASOPHILS # BLD AUTO: 0.02 K/UL — SIGNIFICANT CHANGE UP (ref 0–0.2)
BASOPHILS NFR BLD AUTO: 0.2 % — SIGNIFICANT CHANGE UP (ref 0–2)
BILIRUB SERPL-MCNC: 0.4 MG/DL — SIGNIFICANT CHANGE UP (ref 0.2–1.2)
BUN SERPL-MCNC: 28 MG/DL — HIGH (ref 7–23)
CALCIUM SERPL-MCNC: 9 MG/DL — SIGNIFICANT CHANGE UP (ref 8.5–10.1)
CHLORIDE SERPL-SCNC: 104 MMOL/L — SIGNIFICANT CHANGE UP (ref 96–108)
CO2 SERPL-SCNC: 31 MMOL/L — SIGNIFICANT CHANGE UP (ref 22–31)
CREAT SERPL-MCNC: 1.09 MG/DL — SIGNIFICANT CHANGE UP (ref 0.5–1.3)
EGFR: 65 ML/MIN/1.73M2 — SIGNIFICANT CHANGE UP
EOSINOPHIL # BLD AUTO: 0.36 K/UL — SIGNIFICANT CHANGE UP (ref 0–0.5)
EOSINOPHIL NFR BLD AUTO: 3.9 % — SIGNIFICANT CHANGE UP (ref 0–6)
GLUCOSE SERPL-MCNC: 111 MG/DL — HIGH (ref 70–99)
HCT VFR BLD CALC: 46.7 % — SIGNIFICANT CHANGE UP (ref 39–50)
HGB BLD-MCNC: 15 G/DL — SIGNIFICANT CHANGE UP (ref 13–17)
IMM GRANULOCYTES NFR BLD AUTO: 0.6 % — SIGNIFICANT CHANGE UP (ref 0–0.9)
INR BLD: 0.96 RATIO — SIGNIFICANT CHANGE UP (ref 0.85–1.18)
LACTATE SERPL-SCNC: 1.2 MMOL/L — SIGNIFICANT CHANGE UP (ref 0.7–2)
LYMPHOCYTES # BLD AUTO: 1.21 K/UL — SIGNIFICANT CHANGE UP (ref 1–3.3)
LYMPHOCYTES # BLD AUTO: 13.1 % — SIGNIFICANT CHANGE UP (ref 13–44)
MAGNESIUM SERPL-MCNC: 2.2 MG/DL — SIGNIFICANT CHANGE UP (ref 1.6–2.6)
MCHC RBC-ENTMCNC: 29.6 PG — SIGNIFICANT CHANGE UP (ref 27–34)
MCHC RBC-ENTMCNC: 32.1 GM/DL — SIGNIFICANT CHANGE UP (ref 32–36)
MCV RBC AUTO: 92.3 FL — SIGNIFICANT CHANGE UP (ref 80–100)
MONOCYTES # BLD AUTO: 1.01 K/UL — HIGH (ref 0–0.9)
MONOCYTES NFR BLD AUTO: 10.9 % — SIGNIFICANT CHANGE UP (ref 2–14)
NEUTROPHILS # BLD AUTO: 6.58 K/UL — SIGNIFICANT CHANGE UP (ref 1.8–7.4)
NEUTROPHILS NFR BLD AUTO: 71.3 % — SIGNIFICANT CHANGE UP (ref 43–77)
NT-PROBNP SERPL-SCNC: 237 PG/ML — SIGNIFICANT CHANGE UP (ref 0–450)
PLATELET # BLD AUTO: 255 K/UL — SIGNIFICANT CHANGE UP (ref 150–400)
POTASSIUM SERPL-MCNC: 4.4 MMOL/L — SIGNIFICANT CHANGE UP (ref 3.5–5.3)
POTASSIUM SERPL-SCNC: 4.4 MMOL/L — SIGNIFICANT CHANGE UP (ref 3.5–5.3)
PROT SERPL-MCNC: 7.5 GM/DL — SIGNIFICANT CHANGE UP (ref 6–8.3)
PROTHROM AB SERPL-ACNC: 10.9 SEC — SIGNIFICANT CHANGE UP (ref 9.5–13)
RBC # BLD: 5.06 M/UL — SIGNIFICANT CHANGE UP (ref 4.2–5.8)
RBC # FLD: 13.6 % — SIGNIFICANT CHANGE UP (ref 10.3–14.5)
SODIUM SERPL-SCNC: 138 MMOL/L — SIGNIFICANT CHANGE UP (ref 135–145)
TROPONIN I, HIGH SENSITIVITY RESULT: 13.85 NG/L — SIGNIFICANT CHANGE UP
TROPONIN I, HIGH SENSITIVITY RESULT: 14.69 NG/L — SIGNIFICANT CHANGE UP
WBC # BLD: 9.24 K/UL — SIGNIFICANT CHANGE UP (ref 3.8–10.5)
WBC # FLD AUTO: 9.24 K/UL — SIGNIFICANT CHANGE UP (ref 3.8–10.5)

## 2023-10-11 PROCEDURE — 93005 ELECTROCARDIOGRAM TRACING: CPT

## 2023-10-11 PROCEDURE — 36415 COLL VENOUS BLD VENIPUNCTURE: CPT

## 2023-10-11 PROCEDURE — 94640 AIRWAY INHALATION TREATMENT: CPT

## 2023-10-11 PROCEDURE — 71045 X-RAY EXAM CHEST 1 VIEW: CPT | Mod: 26

## 2023-10-11 PROCEDURE — 70450 CT HEAD/BRAIN W/O DYE: CPT | Mod: 26,MA

## 2023-10-11 PROCEDURE — 74176 CT ABD & PELVIS W/O CONTRAST: CPT | Mod: 26,MA

## 2023-10-11 PROCEDURE — 95816 EEG AWAKE AND DROWSY: CPT

## 2023-10-11 PROCEDURE — 71250 CT THORAX DX C-: CPT | Mod: 26,MA

## 2023-10-11 PROCEDURE — 72125 CT NECK SPINE W/O DYE: CPT | Mod: 26,MA

## 2023-10-11 PROCEDURE — 99285 EMERGENCY DEPT VISIT HI MDM: CPT

## 2023-10-11 PROCEDURE — 97530 THERAPEUTIC ACTIVITIES: CPT | Mod: GP

## 2023-10-11 PROCEDURE — 82962 GLUCOSE BLOOD TEST: CPT

## 2023-10-11 PROCEDURE — 85027 COMPLETE CBC AUTOMATED: CPT

## 2023-10-11 PROCEDURE — 97116 GAIT TRAINING THERAPY: CPT | Mod: GP

## 2023-10-11 PROCEDURE — 83880 ASSAY OF NATRIURETIC PEPTIDE: CPT

## 2023-10-11 PROCEDURE — 80184 ASSAY OF PHENOBARBITAL: CPT

## 2023-10-11 PROCEDURE — 71045 X-RAY EXAM CHEST 1 VIEW: CPT

## 2023-10-11 PROCEDURE — 80188 ASSAY OF PRIMIDONE: CPT

## 2023-10-11 PROCEDURE — 84484 ASSAY OF TROPONIN QUANT: CPT

## 2023-10-11 PROCEDURE — 97162 PT EVAL MOD COMPLEX 30 MIN: CPT | Mod: GP

## 2023-10-11 PROCEDURE — 80048 BASIC METABOLIC PNL TOTAL CA: CPT

## 2023-10-11 PROCEDURE — 93010 ELECTROCARDIOGRAM REPORT: CPT

## 2023-10-11 RX ORDER — VANCOMYCIN HCL 1 G
1250 VIAL (EA) INTRAVENOUS ONCE
Refills: 0 | Status: COMPLETED | OUTPATIENT
Start: 2023-10-11 | End: 2023-10-11

## 2023-10-11 RX ORDER — PREGABALIN 225 MG/1
1 CAPSULE ORAL
Qty: 0 | Refills: 0 | DISCHARGE

## 2023-10-11 RX ORDER — ACETAMINOPHEN 500 MG
2 TABLET ORAL
Refills: 0 | DISCHARGE

## 2023-10-11 RX ORDER — DONEPEZIL HYDROCHLORIDE 10 MG/1
1 TABLET, FILM COATED ORAL
Qty: 0 | Refills: 0 | DISCHARGE

## 2023-10-11 RX ORDER — PIPERACILLIN AND TAZOBACTAM 4; .5 G/20ML; G/20ML
3.38 INJECTION, POWDER, LYOPHILIZED, FOR SOLUTION INTRAVENOUS ONCE
Refills: 0 | Status: COMPLETED | OUTPATIENT
Start: 2023-10-11 | End: 2023-10-11

## 2023-10-11 RX ORDER — LANOLIN ALCOHOL/MO/W.PET/CERES
1 CREAM (GRAM) TOPICAL
Qty: 0 | Refills: 0 | DISCHARGE

## 2023-10-11 RX ORDER — TAMSULOSIN HYDROCHLORIDE 0.4 MG/1
1 CAPSULE ORAL
Qty: 0 | Refills: 0 | DISCHARGE

## 2023-10-11 RX ORDER — METHENAMINE MANDELATE 1 G
1 TABLET ORAL
Qty: 0 | Refills: 0 | DISCHARGE

## 2023-10-11 RX ORDER — TIOTROPIUM BROMIDE AND OLODATEROL 3.124; 2.736 UG/1; UG/1
1 SPRAY, METERED RESPIRATORY (INHALATION)
Qty: 0 | Refills: 0 | DISCHARGE

## 2023-10-11 RX ADMIN — PIPERACILLIN AND TAZOBACTAM 3.38 GRAM(S): 4; .5 INJECTION, POWDER, LYOPHILIZED, FOR SOLUTION INTRAVENOUS at 20:45

## 2023-10-11 RX ADMIN — Medication 250 MILLIGRAM(S): at 21:03

## 2023-10-11 RX ADMIN — PIPERACILLIN AND TAZOBACTAM 200 GRAM(S): 4; .5 INJECTION, POWDER, LYOPHILIZED, FOR SOLUTION INTRAVENOUS at 20:22

## 2023-10-11 NOTE — ED ADULT NURSE REASSESSMENT NOTE - NS ED NURSE REASSESS COMMENT FT1
RN was told by RAMIREZ's pt is on contact for r/o scabies. RN wasn't told by previous RN or MD about scabies r/o. RN tried contacting [facility] and nobody answered on the phone. Pt is DNM until hospitalist sees the pt.

## 2023-10-11 NOTE — ED PROVIDER NOTE - CLINICAL SUMMARY MEDICAL DECISION MAKING FREE TEXT BOX
87 y/o male with report of chest pain. Pt poor historian. Concerned for pneumonia with examination. Screening EKG, CXR, CT chest, labs, and reevaluate. 89 y/o male with report of chest pain. Pt poor historian. Concerned for pneumonia with examination. Screening EKG, CXR, CT chest, labs, and reevaluate.    Freedom DO: cxr and CT chest with RLL pna; endorsed to Dr. Hale for admission.

## 2023-10-11 NOTE — ED ADULT TRIAGE NOTE - CHIEF COMPLAINT QUOTE
Pt. A&OX2, history of dementia. BIBEMS with c/o chest pain. EMS report pt having left sided chest pain, unknown when it began. 12-lead EKG obtained in the ambulance.   Taken for EKG and cardiac monitor.

## 2023-10-11 NOTE — ED PROVIDER NOTE - OBJECTIVE STATEMENT
87 y/o male with PMHx of COPD, HLD, and dementia BIBEMS to the ED from [place] c/o left-sided chest pain worse with inhalation, unknown time of onset. Pt reports he sustained a fall yesterday. Pt limited historian due to Hx of dementia.

## 2023-10-11 NOTE — PHARMACOTHERAPY INTERVENTION NOTE - COMMENTS
Medication reconciliation completed.  Reviewed Medication list and confirmed med allergies with list from Care Facility "[facility]"; confirmed with Dr. First Easley.

## 2023-10-11 NOTE — ED PROVIDER NOTE - NSICDXPASTMEDICALHX_GEN_ALL_CORE_FT
PAST MEDICAL HISTORY:  Chronic cough     COPD, severe     HLD (hyperlipidemia)     Occasional tremors severe essential tremors    Osteoporosis     Scoliosis       Dementia

## 2023-10-11 NOTE — ED ADULT NURSE REASSESSMENT NOTE - NS ED NURSE REASSESS COMMENT FT1
received report from previous RN. pt placed on bed alarm. family at bedside updated on plan of care. pt a&ox2. IV flushed and patent, vital sign retaken and charted in EMR. antibiotics started. call bell light in reach.
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
## 2023-10-12 LAB
RAPID RVP RESULT: SIGNIFICANT CHANGE UP
SARS-COV-2 RNA SPEC QL NAA+PROBE: SIGNIFICANT CHANGE UP
TROPONIN I, HIGH SENSITIVITY RESULT: 14.38 NG/L — SIGNIFICANT CHANGE UP

## 2023-10-12 PROCEDURE — 99222 1ST HOSP IP/OBS MODERATE 55: CPT

## 2023-10-12 RX ORDER — ACETAMINOPHEN 500 MG
650 TABLET ORAL EVERY 6 HOURS
Refills: 0 | Status: DISCONTINUED | OUTPATIENT
Start: 2023-10-12 | End: 2023-10-24

## 2023-10-12 RX ORDER — MORPHINE SULFATE 50 MG/1
2 CAPSULE, EXTENDED RELEASE ORAL ONCE
Refills: 0 | Status: DISCONTINUED | OUTPATIENT
Start: 2023-10-12 | End: 2023-10-12

## 2023-10-12 RX ORDER — TIOTROPIUM BROMIDE AND OLODATEROL 3.124; 2.736 UG/1; UG/1
2 SPRAY, METERED RESPIRATORY (INHALATION) DAILY
Refills: 0 | Status: DISCONTINUED | OUTPATIENT
Start: 2023-10-12 | End: 2023-10-24

## 2023-10-12 RX ORDER — TOPIRAMATE 25 MG
50 TABLET ORAL
Refills: 0 | Status: DISCONTINUED | OUTPATIENT
Start: 2023-10-12 | End: 2023-10-21

## 2023-10-12 RX ORDER — LANOLIN ALCOHOL/MO/W.PET/CERES
10 CREAM (GRAM) TOPICAL AT BEDTIME
Refills: 0 | Status: DISCONTINUED | OUTPATIENT
Start: 2023-10-12 | End: 2023-10-15

## 2023-10-12 RX ORDER — IPRATROPIUM/ALBUTEROL SULFATE 18-103MCG
3 AEROSOL WITH ADAPTER (GRAM) INHALATION ONCE
Refills: 0 | Status: COMPLETED | OUTPATIENT
Start: 2023-10-12 | End: 2023-10-12

## 2023-10-12 RX ORDER — ONDANSETRON 8 MG/1
4 TABLET, FILM COATED ORAL EVERY 8 HOURS
Refills: 0 | Status: DISCONTINUED | OUTPATIENT
Start: 2023-10-12 | End: 2023-10-24

## 2023-10-12 RX ORDER — FUROSEMIDE 40 MG
40 TABLET ORAL DAILY
Refills: 0 | Status: DISCONTINUED | OUTPATIENT
Start: 2023-10-12 | End: 2023-10-15

## 2023-10-12 RX ORDER — PRIMIDONE 250 MG/1
100 TABLET ORAL
Refills: 0 | Status: DISCONTINUED | OUTPATIENT
Start: 2023-10-12 | End: 2023-10-21

## 2023-10-12 RX ORDER — TAMSULOSIN HYDROCHLORIDE 0.4 MG/1
0.4 CAPSULE ORAL AT BEDTIME
Refills: 0 | Status: DISCONTINUED | OUTPATIENT
Start: 2023-10-12 | End: 2023-10-24

## 2023-10-12 RX ORDER — LATANOPROST 0.05 MG/ML
1 SOLUTION/ DROPS OPHTHALMIC; TOPICAL AT BEDTIME
Refills: 0 | Status: DISCONTINUED | OUTPATIENT
Start: 2023-10-12 | End: 2023-10-24

## 2023-10-12 RX ORDER — TIMOLOL 0.5 %
1 DROPS OPHTHALMIC (EYE) DAILY
Refills: 0 | Status: DISCONTINUED | OUTPATIENT
Start: 2023-10-12 | End: 2023-10-24

## 2023-10-12 RX ORDER — ACETAMINOPHEN 500 MG
650 TABLET ORAL ONCE
Refills: 0 | Status: COMPLETED | OUTPATIENT
Start: 2023-10-12 | End: 2023-10-12

## 2023-10-12 RX ORDER — PIPERACILLIN AND TAZOBACTAM 4; .5 G/20ML; G/20ML
3.38 INJECTION, POWDER, LYOPHILIZED, FOR SOLUTION INTRAVENOUS EVERY 8 HOURS
Refills: 0 | Status: COMPLETED | OUTPATIENT
Start: 2023-10-12 | End: 2023-10-18

## 2023-10-12 RX ORDER — DONEPEZIL HYDROCHLORIDE 10 MG/1
10 TABLET, FILM COATED ORAL AT BEDTIME
Refills: 0 | Status: DISCONTINUED | OUTPATIENT
Start: 2023-10-12 | End: 2023-10-24

## 2023-10-12 RX ORDER — ALBUTEROL 90 UG/1
2 AEROSOL, METERED ORAL EVERY 4 HOURS
Refills: 0 | Status: DISCONTINUED | OUTPATIENT
Start: 2023-10-12 | End: 2023-10-24

## 2023-10-12 RX ORDER — HEPARIN SODIUM 5000 [USP'U]/ML
5000 INJECTION INTRAVENOUS; SUBCUTANEOUS EVERY 8 HOURS
Refills: 0 | Status: DISCONTINUED | OUTPATIENT
Start: 2023-10-12 | End: 2023-10-19

## 2023-10-12 RX ADMIN — Medication 5 MILLIGRAM(S): at 12:03

## 2023-10-12 RX ADMIN — Medication 50 MILLIGRAM(S): at 21:30

## 2023-10-12 RX ADMIN — Medication 600 MILLIGRAM(S): at 12:09

## 2023-10-12 RX ADMIN — Medication 650 MILLIGRAM(S): at 14:27

## 2023-10-12 RX ADMIN — PIPERACILLIN AND TAZOBACTAM 25 GRAM(S): 4; .5 INJECTION, POWDER, LYOPHILIZED, FOR SOLUTION INTRAVENOUS at 14:27

## 2023-10-12 RX ADMIN — Medication 650 MILLIGRAM(S): at 02:10

## 2023-10-12 RX ADMIN — PRIMIDONE 100 MILLIGRAM(S): 250 TABLET ORAL at 21:31

## 2023-10-12 RX ADMIN — Medication 40 MILLIGRAM(S): at 12:01

## 2023-10-12 RX ADMIN — Medication 50 MILLIGRAM(S): at 12:03

## 2023-10-12 RX ADMIN — TIOTROPIUM BROMIDE AND OLODATEROL 2 PUFF(S): 3.124; 2.736 SPRAY, METERED RESPIRATORY (INHALATION) at 09:04

## 2023-10-12 RX ADMIN — HEPARIN SODIUM 5000 UNIT(S): 5000 INJECTION INTRAVENOUS; SUBCUTANEOUS at 06:13

## 2023-10-12 RX ADMIN — Medication 1 DROP(S): at 14:28

## 2023-10-12 RX ADMIN — Medication 600 MILLIGRAM(S): at 21:30

## 2023-10-12 RX ADMIN — Medication 3 MILLILITER(S): at 02:40

## 2023-10-12 RX ADMIN — HEPARIN SODIUM 5000 UNIT(S): 5000 INJECTION INTRAVENOUS; SUBCUTANEOUS at 14:27

## 2023-10-12 RX ADMIN — DONEPEZIL HYDROCHLORIDE 10 MILLIGRAM(S): 10 TABLET, FILM COATED ORAL at 21:31

## 2023-10-12 RX ADMIN — LATANOPROST 1 DROP(S): 0.05 SOLUTION/ DROPS OPHTHALMIC; TOPICAL at 21:32

## 2023-10-12 RX ADMIN — Medication 10 MILLIGRAM(S): at 21:29

## 2023-10-12 RX ADMIN — PIPERACILLIN AND TAZOBACTAM 25 GRAM(S): 4; .5 INJECTION, POWDER, LYOPHILIZED, FOR SOLUTION INTRAVENOUS at 21:32

## 2023-10-12 RX ADMIN — TAMSULOSIN HYDROCHLORIDE 0.4 MILLIGRAM(S): 0.4 CAPSULE ORAL at 21:30

## 2023-10-12 RX ADMIN — MORPHINE SULFATE 2 MILLIGRAM(S): 50 CAPSULE, EXTENDED RELEASE ORAL at 05:59

## 2023-10-12 RX ADMIN — HEPARIN SODIUM 5000 UNIT(S): 5000 INJECTION INTRAVENOUS; SUBCUTANEOUS at 21:30

## 2023-10-12 RX ADMIN — PIPERACILLIN AND TAZOBACTAM 25 GRAM(S): 4; .5 INJECTION, POWDER, LYOPHILIZED, FOR SOLUTION INTRAVENOUS at 06:13

## 2023-10-12 RX ADMIN — Medication 650 MILLIGRAM(S): at 03:03

## 2023-10-12 RX ADMIN — MORPHINE SULFATE 2 MILLIGRAM(S): 50 CAPSULE, EXTENDED RELEASE ORAL at 04:30

## 2023-10-12 NOTE — ED ADULT NURSE REASSESSMENT NOTE - NS ED NURSE REASSESS COMMENT FT1
RN spoke with pt's Wife lou. Pt noted to have rash on legs and right arm. Pt's wife states "he's not contagious, [facility] said that think it's possible Eczema".

## 2023-10-12 NOTE — CONSULT NOTE ADULT - ASSESSMENT
89 y/o Male with PMHx of COPD, HLD, and dementia BIBEMS to the ED from [place] with complain of left-sided chest pain worse with inhalation, unknown time of onset. Patient is a limited historian due to Hx of dementia. In the ED patient is found to have pneumonia. Was given vancomcyin/zosyn.    1. RLL pneumonia. COPD. Alzheimers dementia  - agree with zosyn  - s/p vancomycin hold further vanco  - f/u cultures  - monitor temps  - tolerating abx well so far; no side effects noted  - reason for abx use and side effects reviewed with patient  - supportive care  - fu cbc  - aspiration precautions    2. other issues - care per medicine

## 2023-10-12 NOTE — H&P ADULT - NSICDXPASTMEDICALHX_GEN_ALL_CORE_FT
PAST MEDICAL HISTORY:  Chronic cough     COPD, severe     Dementia     HLD (hyperlipidemia)     Occasional tremors severe essential tremors    Osteoporosis     Scoliosis

## 2023-10-12 NOTE — PROGRESS NOTE ADULT - ASSESSMENT
#Pneumonia- ct abx for suspected gram negative pneumonia  #Dementia -supportive care   #dvt pr

## 2023-10-12 NOTE — PROGRESS NOTE ADULT - SUBJECTIVE AND OBJECTIVE BOX
HPI:  87 y/o Male with PMHx of COPD, HLD, and dementia BIBEMS to the ED from [place] with complain of left-sided chest pain worse with inhalation, unknown time of onset. Patient is a limited historian due to Hx of dementia. In the ED patient is found to have pneumonia.           Review of Systems:  Unable to obtain in view of dementia     PHYSICAL EXAM:    Vital Signs Last 24 Hrs  T(C): 36.5 (12 Oct 2023 07:36), Max: 36.9 (11 Oct 2023 20:25)  T(F): 97.7 (12 Oct 2023 07:36), Max: 98.4 (11 Oct 2023 20:25)  HR: 55 (12 Oct 2023 09:08) (52 - 94)  BP: 137/53 (12 Oct 2023 07:36) (122/55 - 150/63)  BP(mean): 76 (12 Oct 2023 07:36) (60 - 96)  RR: 18 (12 Oct 2023 07:36) (18 - 21)  SpO2: 100% (12 Oct 2023 07:36) (93% - 100%)    Parameters below as of 12 Oct 2023 07:36  Patient On (Oxygen Delivery Method): nasal cannula  O2 Flow (L/min): 3      GENERAL: comfortable   HEAD:  Atraumatic, Normocephalic  EYES: EOMI, PERRLA, conjunctiva and sclera clear  HEENT: Moist mucous membranes  NECK: Supple, No JVD  NERVOUS SYSTEM:  Alert  CHEST/LUNG: AEEB, crackles present   HEART:S1S2 normal, no murmer  ABDOMEN: Soft, Nontender, Nondistended; Bowel sounds present  GENITOURINARY- Voiding, no palpable bladder  EXTREMITIES:  2+ Peripheral Pulses, No clubbing, cyanosis, or edema  MUSCULOSKELTAL- No muscle tenderness, Muscle tone normal, No joint tenderness, no Joint swelling, Joint range of motion-normal  SKIN-no rash, no lesion  PSYCH- Mood stable  LYMPH Node- No palpable lymph node    LABS:                        15.0   9.24  )-----------( 255      ( 11 Oct 2023 18:57 )             46.7     10-11    138  |  104  |  28<H>  ----------------------------<  111<H>  4.4   |  31  |  1.09    Ca    9.0      11 Oct 2023 18:57  Mg     2.2     10-11    TPro  7.5  /  Alb  3.2<L>  /  TBili  0.4  /  DBili  x   /  AST  15  /  ALT  27  /  AlkPhos  144<H>  10-11    PT/INR - ( 11 Oct 2023 18:57 )   PT: 10.9 sec;   INR: 0.96 ratio         PTT - ( 11 Oct 2023 18:57 )  PTT:31.9 sec  Urinalysis Basic - ( 11 Oct 2023 18:57 )    Color: x / Appearance: x / SG: x / pH: x  Gluc: 111 mg/dL / Ketone: x  / Bili: x / Urobili: x   Blood: x / Protein: x / Nitrite: x   Leuk Esterase: x / RBC: x / WBC x   Sq Epi: x / Non Sq Epi: x / Bacteria: x        CAPILLARY BLOOD GLUCOSE                Standing medicine  acetaminophen     Tablet .. 650 milliGRAM(s) Oral every 6 hours PRN  albuterol    90 MICROgram(s) HFA Inhaler 2 Puff(s) Inhalation every 4 hours PRN  aluminum hydroxide/magnesium hydroxide/simethicone Suspension 30 milliLiter(s) Oral every 4 hours PRN  donepezil 10 milliGRAM(s) Oral at bedtime  furosemide    Tablet 40 milliGRAM(s) Oral daily  guaiFENesin  milliGRAM(s) Oral every 12 hours  heparin   Injectable 5000 Unit(s) SubCutaneous every 8 hours  latanoprost 0.005% Ophthalmic Solution 1 Drop(s) Both EYES at bedtime  melatonin 10 milliGRAM(s) Oral at bedtime  ondansetron Injectable 4 milliGRAM(s) IV Push every 8 hours PRN  piperacillin/tazobactam IVPB.. 3.375 Gram(s) IV Intermittent every 8 hours  predniSONE   Tablet 5 milliGRAM(s) Oral daily  primidone 100 milliGRAM(s) Oral two times a day  tamsulosin 0.4 milliGRAM(s) Oral at bedtime  timolol 0.5% Solution 1 Drop(s) Both EYES daily  tiotropium 2.5 MICROgram(s)/olodaterol 2.5 MICROgram(s) (STIOLTO) Inhaler 2 Puff(s) Inhalation daily  topiramate 50 milliGRAM(s) Oral two times a day

## 2023-10-12 NOTE — H&P ADULT - NSHPPHYSICALEXAM_GEN_ALL_CORE
T(C): 36.7 (10-12-23 @ 02:38), Max: 36.9 (10-11-23 @ 20:25)  HR: 52 (10-12-23 @ 05:40) (52 - 94)  BP: 140/57 (10-12-23 @ 05:40) (122/55 - 150/63)  RR: 21 (10-12-23 @ 02:38) (18 - 21)  SpO2: 100% (10-12-23 @ 05:40) (93% - 100%)    CONSTITUTIONAL: Well groomed, no apparent distress  EYES: PERRLA and symmetric, EOMI, No conjunctival or scleral injection, non-icteric  ENMT: Oral mucosa with moist membranes. Normal dentition; no pharyngeal injection or exudates             NECK: Supple, symmetric and without tracheal deviation   RESP: No respiratory distress, no use of accessory muscles; Coarse breath sound in lower lung fields. good air movement.   CV: RRR, +S1S2, no MRG; no JVD; no peripheral edema  GI: Soft, NT, ND, no rebound, no guarding; no palpable masses;   LYMPH: No cervical LAD or tenderness;   NEURO: unable to assess as patient has dementia  PSYCH: unable to assess as patient has dementia

## 2023-10-12 NOTE — INPATIENT CERTIFICATION FOR MEDICARE PATIENTS - IN ORDER TO MEET MEDICARE REQUIREMENTS.
no edema, no murmurs, regular rate and rhythm In order to meet Medicare requirements, the clinical documentation must support the information cited in the admission order.  Please be sure to provide detailed and clear documentation about the following in the admitting note/history and physical:

## 2023-10-12 NOTE — CONSULT NOTE ADULT - SUBJECTIVE AND OBJECTIVE BOX
Patient is a 88y old  Male who presents with a chief complaint of Pneumonia (12 Oct 2023 05:42)    HPI:  89 y/o Male with PMHx of COPD, HLD, and dementia BIBEMS to the ED from [place] [facility] with complain of left-sided chest pain worse with inhalation, unknown time of onset. Patient is a limited historian due to Hx of dementia. In the ED patient is found to have pneumonia. Was given vancomcyin/zosyn.      PMH: as above  PSH: as above  Meds: per reconciliation sheet, noted below  MEDICATIONS  (STANDING):  donepezil 10 milliGRAM(s) Oral at bedtime  furosemide    Tablet 40 milliGRAM(s) Oral daily  guaiFENesin  milliGRAM(s) Oral every 12 hours  heparin   Injectable 5000 Unit(s) SubCutaneous every 8 hours  latanoprost 0.005% Ophthalmic Solution 1 Drop(s) Both EYES at bedtime  melatonin 10 milliGRAM(s) Oral at bedtime  piperacillin/tazobactam IVPB.. 3.375 Gram(s) IV Intermittent every 8 hours  predniSONE   Tablet 5 milliGRAM(s) Oral daily  primidone 100 milliGRAM(s) Oral two times a day  tamsulosin 0.4 milliGRAM(s) Oral at bedtime  timolol 0.5% Solution 1 Drop(s) Both EYES daily  tiotropium 2.5 MICROgram(s)/olodaterol 2.5 MICROgram(s) (STIOLTO) Inhaler 2 Puff(s) Inhalation daily  topiramate 50 milliGRAM(s) Oral two times a day    Allergies    azithromycin (Rash)    Intolerances    lactose (Diarrhea)    Social: no smoking, no alcohol, no illegal drugs; no recent travel, no exposure to TB  FAMILY HISTORY:  FHx: dementia (Mother)    FH: colon cancer (Father)    FH: Parkinson's disease (Sibling)    FH: stroke (Sibling)       no history of premature cardiovascular disease in first degree relatives    ROS: the patient denies fever, no chills, no HA, no dizziness, no sore throat, no blurry vision, no CP, no palpitations, + SOB, + cough, no abdominal pain, no diarrhea, no N/V, no dysuria, no leg pain, no claudication, no rash, no joint aches, no rectal pain or bleeding, no night sweats    All other systems reviewed and are negative    Vital Signs Last 24 Hrs  T(C): 36.5 (12 Oct 2023 07:36), Max: 36.9 (11 Oct 2023 20:25)  T(F): 97.7 (12 Oct 2023 07:36), Max: 98.4 (11 Oct 2023 20:25)  HR: 55 (12 Oct 2023 09:08) (52 - 94)  BP: 137/53 (12 Oct 2023 07:36) (122/55 - 150/63)  BP(mean): 76 (12 Oct 2023 07:36) (60 - 96)  RR: 18 (12 Oct 2023 07:36) (18 - 21)  SpO2: 100% (12 Oct 2023 07:36) (93% - 100%)    Parameters below as of 12 Oct 2023 07:36  Patient On (Oxygen Delivery Method): nasal cannula  O2 Flow (L/min): 3    Daily Height in cm: 172.72 (11 Oct 2023 20:44)    Daily     PE:  Constitutional: frail looking  HEENT: NC/AT, EOMI, PERRLA, conjunctivae clear; ears and nose atraumatic; pharynx benign  Neck: supple; thyroid not palpable  Back: no tenderness  Respiratory: decreased breath sounds   Cardiovascular: S1S2 regular, no murmurs  Abdomen: soft, not tender, not distended, positive BS; liver and spleen WNL  Genitourinary: no suprapubic tenderness  Lymphatic: no LN palpable  Musculoskeletal: no muscle tenderness, no joint swelling or tenderness  Extremities: no pedal edema  Neurological/ Psychiatric:  moving all extremities  Skin: no rashes; no palpable lesions    Labs: all available labs reviewed                        15.0   9.24  )-----------( 255      ( 11 Oct 2023 18:57 )             46.7     10-11    138  |  104  |  28<H>  ----------------------------<  111<H>  4.4   |  31  |  1.09    Ca    9.0      11 Oct 2023 18:57  Mg     2.2     10-11    TPro  7.5  /  Alb  3.2<L>  /  TBili  0.4  /  DBili  x   /  AST  15  /  ALT  27  /  AlkPhos  144<H>  10-11     LIVER FUNCTIONS - ( 11 Oct 2023 18:57 )  Alb: 3.2 g/dL / Pro: 7.5 gm/dL / ALK PHOS: 144 U/L / ALT: 27 U/L / AST: 15 U/L / GGT: x           Urinalysis Basic - ( 11 Oct 2023 18:57 )    Color: x / Appearance: x / SG: x / pH: x  Gluc: 111 mg/dL / Ketone: x  / Bili: x / Urobili: x   Blood: x / Protein: x / Nitrite: x   Leuk Esterase: x / RBC: x / WBC x   Sq Epi: x / Non Sq Epi: x / Bacteria: x          Radiology: all available radiological tests reviewed  < from: CT Abdomen and Pelvis No Cont (10.11.23 @ 19:58) >    ACC: [id number] EXAM:  CT ABDOMEN AND PELVIS   ORDERED BY: MOO SHAY     ACC: [id number] EXAM:  CT CHEST   ORDERED BY: MOO SHAY     PROCEDURE DATE:  10/11/2023          INTERPRETATION:  CLINICAL INFORMATION: Chest pain with questionable fall    COMPARISON: 8/22/2021    CONTRAST/COMPLICATIONS:  IV Contrast: NONE  Oral Contrast: NONE  Complications: None reported at time of study completion    PROCEDURE:  CT of the Chest, Abdomen and Pelvis was performed.  Sagittal and coronal reformats were performed.    FINDINGS:  CHEST:  LUNGS AND LARGE AIRWAYS: Patent central airways. No pulmonary nodules.   Emphysema. Infiltrate in the right lower lobe at the lung base posterior   medially  PLEURA: No pleural effusion.  VESSELS: Atherosclerotic changes of the aorta and coronary arteries.   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 No pericardial effusion.  MEDIASTINUM AND EMMA: No lymphadenopathy.  CHEST WALL AND LOWER NECK: Within normal limits.    ABDOMEN AND PELVIS:  LIVER: Multiple hepatic cysts  BILE DUCTS: Normal caliber.  GALLBLADDER: Within normal limits.  SPLEEN: Within normal limits.  PANCREAS: Within normal limits.  ADRENALS: Within normal limits.  KIDNEYS/URETERS: No hydronephrosis. Exophytic right upper pole cyst and   other small hypodensities too small to characterize. Small hyperdense   lesion at the lower pole of the right kidney too small to characterize.   Calcifications in the kidneys bilaterally are punctate and may be   vascular versus nonobstructive calculi    BLADDER: Mild diffuse bladder wall thickening likely due to outlet   obstruction. A small bladder calculus again seen  REPRODUCTIVE ORGANS: Fiducial markers in the prostate    BOWEL: No bowel obstruction. Appendix is not visualized. No evidence of   inflammation in the pericecal region There is a suture line in the   proximal sigmoid colon. There is diverticulosis without diverticulitis  PERITONEUM: No ascites.  VESSELS: Atherosclerotic changes.  RETROPERITONEUM/LYMPH NODES: No lymphadenopathy.  ABDOMINAL WALL: Within normal limits.  BONES: Degenerative changes.. ORIF of the left hip is seen with a single   screw. There is severe levoscoliosis of the upper lumbar spine    IMPRESSION:  Emphysema. Right lower lobe infiltrate  Thick-walled bladder with bladder calculus similar to the prior study  Hepatic and renal cysts  Diverticulosis without diverticulitis  No evidence of acute traumatic injury    --- End of Report ---        < end of copied text >    Advanced directives addressed: full resuscitation

## 2023-10-12 NOTE — H&P ADULT - HISTORY OF PRESENT ILLNESS
89 y/o male with PMHx of COPD, HLD, and dementia BIBEMS to the ED from [place] [facility] c/o left-sided chest pain worse with inhalation, unknown time of onset. Pt reports he sustained a fall yesterday. Pt limited historian due to Hx of dementia. 87 y/o Male with PMHx of COPD, HLD, and dementia BIBEMS to the ED from [place] [facility] with complain of left-sided chest pain worse with inhalation, unknown time of onset. Patient is a limited historian due to Hx of dementia. In the ED patient is found to have pneumonia.     PMHx:  Unable to get detail family hx as patient has dementia.

## 2023-10-12 NOTE — H&P ADULT - ASSESSMENT
A/P:    1.  Pneumonia  Chest pain  -started on IV Antibiotics  -follow cultures  -follow ID consult  -[id number]-neg  -No acute changes in EKG    2.  Heparin for DVT ppx    3.  Code status: Patient is DNR/DNI, MOLST form is in the chart.

## 2023-10-12 NOTE — ED ADULT NURSE REASSESSMENT NOTE - NS ED NURSE REASSESS COMMENT FT1
pt cleaned, fresh linens and clean diaper placed. call bell in reach, bed alarm in place. family member Felicia updated via phone.

## 2023-10-13 PROCEDURE — 99232 SBSQ HOSP IP/OBS MODERATE 35: CPT

## 2023-10-13 RX ORDER — IPRATROPIUM/ALBUTEROL SULFATE 18-103MCG
3 AEROSOL WITH ADAPTER (GRAM) INHALATION ONCE
Refills: 0 | Status: COMPLETED | OUTPATIENT
Start: 2023-10-13 | End: 2023-10-13

## 2023-10-13 RX ADMIN — PIPERACILLIN AND TAZOBACTAM 25 GRAM(S): 4; .5 INJECTION, POWDER, LYOPHILIZED, FOR SOLUTION INTRAVENOUS at 05:26

## 2023-10-13 RX ADMIN — DONEPEZIL HYDROCHLORIDE 10 MILLIGRAM(S): 10 TABLET, FILM COATED ORAL at 21:40

## 2023-10-13 RX ADMIN — Medication 50 MILLIGRAM(S): at 21:42

## 2023-10-13 RX ADMIN — Medication 10 MILLIGRAM(S): at 21:41

## 2023-10-13 RX ADMIN — PIPERACILLIN AND TAZOBACTAM 25 GRAM(S): 4; .5 INJECTION, POWDER, LYOPHILIZED, FOR SOLUTION INTRAVENOUS at 21:41

## 2023-10-13 RX ADMIN — PIPERACILLIN AND TAZOBACTAM 25 GRAM(S): 4; .5 INJECTION, POWDER, LYOPHILIZED, FOR SOLUTION INTRAVENOUS at 16:11

## 2023-10-13 RX ADMIN — Medication 600 MILLIGRAM(S): at 21:41

## 2023-10-13 RX ADMIN — Medication 600 MILLIGRAM(S): at 08:45

## 2023-10-13 RX ADMIN — HEPARIN SODIUM 5000 UNIT(S): 5000 INJECTION INTRAVENOUS; SUBCUTANEOUS at 05:28

## 2023-10-13 RX ADMIN — TAMSULOSIN HYDROCHLORIDE 0.4 MILLIGRAM(S): 0.4 CAPSULE ORAL at 21:42

## 2023-10-13 RX ADMIN — TIOTROPIUM BROMIDE AND OLODATEROL 2 PUFF(S): 3.124; 2.736 SPRAY, METERED RESPIRATORY (INHALATION) at 09:07

## 2023-10-13 RX ADMIN — LATANOPROST 1 DROP(S): 0.05 SOLUTION/ DROPS OPHTHALMIC; TOPICAL at 22:30

## 2023-10-13 RX ADMIN — Medication 50 MILLIGRAM(S): at 08:42

## 2023-10-13 RX ADMIN — Medication 40 MILLIGRAM(S): at 09:11

## 2023-10-13 RX ADMIN — Medication 1 DROP(S): at 08:43

## 2023-10-13 RX ADMIN — Medication 40 MILLIGRAM(S): at 08:45

## 2023-10-13 RX ADMIN — PRIMIDONE 100 MILLIGRAM(S): 250 TABLET ORAL at 21:41

## 2023-10-13 RX ADMIN — Medication 3 MILLILITER(S): at 08:55

## 2023-10-13 RX ADMIN — PRIMIDONE 100 MILLIGRAM(S): 250 TABLET ORAL at 08:42

## 2023-10-13 RX ADMIN — HEPARIN SODIUM 5000 UNIT(S): 5000 INJECTION INTRAVENOUS; SUBCUTANEOUS at 16:11

## 2023-10-13 RX ADMIN — HEPARIN SODIUM 5000 UNIT(S): 5000 INJECTION INTRAVENOUS; SUBCUTANEOUS at 21:41

## 2023-10-13 NOTE — PATIENT PROFILE ADULT - FUNCTIONAL ASSESSMENT - BASIC MOBILITY SCORE.
PT PAINFUL UPON TRYING TO GET OUT OF BED FOR BATHROOM. ADMINISTERED TYLENOL
WITH SECOND RN VERIFICATION AFIA HAHN. PT SWALLOWED MED WO DIFF. WILL GET
UP TO RESTROOM AFTER MED TAKES EFFECT. 6

## 2023-10-13 NOTE — PROGRESS NOTE ADULT - ASSESSMENT
89 y/o Male with PMHx of COPD, HLD, and dementia BIBEMS to the ED from [place] [facility] with complain of left-sided chest pain worse with inhalation, unknown time of onset. Patient is a limited historian due to Hx of dementia. In the ED patient is found to have pneumonia. Was given vancomcyin/zosyn.    1. RLL pneumonia. COPD. Alzheimers dementia  - on zosyn #2   - f/u cultures - no growth   - monitor temps  - tolerating abx well so far; no side effects noted  - reason for abx use and side effects reviewed with patient  - continue with abx coverage   - supportive care  - fu cbc  - aspiration precautions    2. other issues - care per medicine

## 2023-10-13 NOTE — PATIENT PROFILE ADULT - FUNCTIONAL ASSESSMENT - DAILY ACTIVITY 1.
Caller: Raul Gardner    Relationship: Self    Best call back number: [phone]      What medications are you currently taking:   Current Outpatient Medications on File Prior to Visit   Medication Sig Dispense Refill   • escitalopram (LEXAPRO) 10 MG tablet TAKE 1 TABLET BY MOUTH EVERY DAY 90 tablet 2   • metoprolol tartrate (LOPRESSOR) 50 MG tablet Take 1 tablet by mouth Every 12 (Twelve) Hours for 30 days. 60 tablet 0   • ondansetron ODT (ZOFRAN-ODT) 4 MG disintegrating tablet Place 1 tablet on the tongue Every 8 (Eight) Hours As Needed for Nausea or Vomiting. 30 tablet 0     No current facility-administered medications on file prior to visit.          When did you start taking these medications: 08/17/22    Which medication are you concerned about: METOPROLOL 50 MG TWICE DAILY.    Who prescribed you this medication: HOSPITAL     What are your concerns: SHE STATES THAT SHE FEELS VERY TIRED AND FATIGUED AND SHE HAS AN OFF AN ON HEADACHE FOR THE PAST 11 OR 12 DAYS AND SHE IS NOT SURE IF THIS HAS SOMETHING TO DO WITH THE INCREASE OF THIS DOSAGE SHE USED TO TAKE 25 MG ONCE DAILY AND NOW WITH IT BEING INCREASED SHE IS NOT FEELING RIGHT.           1 = Total assistance

## 2023-10-13 NOTE — PATIENT PROFILE ADULT - FALL HARM RISK - HARM RISK INTERVENTIONS
Assistance with ambulation/Assistance OOB with selected safe patient handling equipment/Communicate Risk of Fall with Harm to all staff/Discuss with provider need for PT consult/Monitor gait and stability/Reinforce activity limits and safety measures with patient and family/Tailored Fall Risk Interventions/Visual Cue: Yellow wristband and red socks/Bed in lowest position, wheels locked, appropriate side rails in place/Call bell, personal items and telephone in reach/Instruct patient to call for assistance before getting out of bed or chair/Non-slip footwear when patient is out of bed/[place] to call system/Physically safe environment - no spills, clutter or unnecessary equipment/Purposeful Proactive Rounding/Room/bathroom lighting operational, light cord in reach

## 2023-10-13 NOTE — PROVIDER CONTACT NOTE (OTHER) - ACTION/TREATMENT ORDERED:
MD aware, duoneb x2 ordered and IVP steroids
2mg of morphine to be given
Repeat trop, 650mg of tylenol

## 2023-10-13 NOTE — PATIENT PROFILE ADULT - FUNCTIONAL ASSESSMENT - BASIC MOBILITY 6.
1-calculated by average/Not able to assess (calculate score using [facility] averaging method)

## 2023-10-13 NOTE — PROGRESS NOTE ADULT - SUBJECTIVE AND OBJECTIVE BOX
HPI:  89 y/o Male with PMHx of COPD, HLD, and dementia BIBEMS to the ED from [place] [facility] with complain of left-sided chest pain worse with inhalation, unknown time of onset.      ROS- unable to get it as pt has dementia     Vital Signs Last 24 Hrs  T(C): 36.5 (13 Oct 2023 15:50), Max: 36.5 (13 Oct 2023 15:50)  T(F): 97.7 (13 Oct 2023 15:50), Max: 97.7 (13 Oct 2023 15:50)  HR: 85 (13 Oct 2023 15:50) (73 - 85)  BP: 128/56 (13 Oct 2023 15:50) (123/56 - 128/56)  BP(mean): --  RR: 18 (13 Oct 2023 15:50) (18 - 18)  SpO2: 92% (13 Oct 2023 15:50) (89% - 92%)    Parameters below as of 13 Oct 2023 15:50  Patient On (Oxygen Delivery Method): nasal cannula  O2 Flow (L/min): 3      GENERAL: comfortable   HEAD:  Atraumatic, Normocephalic  EYES: EOMI, PERRLA, conjunctiva and sclera clear  HEENT: Moist mucous membranes  NECK: Supple, No JVD  NERVOUS SYSTEM:  Alert  CHEST/LUNG: AEEB, b/l crackles present   HEART:S1S2 normal, no murmer  ABDOMEN: Soft, Nontender, Nondistended; Bowel sounds present  GENITOURINARY- Voiding, no palpable bladder  EXTREMITIES:  2+ Peripheral Pulses, No clubbing, cyanosis, or edema  MUSCULOSKELETAL No muscle tenderness, Muscle tone normal, No joint tenderness, no Joint swelling, Joint range of motion-normal  SKIN-no rash, no lesion  PSYCH- Mood stable  LYMPH Node- No palpable lymph node    LABS:                        15.0   9.24  )-----------( 255      ( 11 Oct 2023 18:57 )             46.7     10-11    138  |  104  |  28<H>  ----------------------------<  111<H>  4.4   |  31  |  1.09    Ca    9.0      11 Oct 2023 18:57  Mg     2.2     10-11    TPro  7.5  /  Alb  3.2<L>  /  TBili  0.4  /  DBili  x   /  AST  15  /  ALT  27  /  AlkPhos  144<H>  10-11    PT/INR - ( 11 Oct 2023 18:57 )   PT: 10.9 sec;   INR: 0.96 ratio         PTT - ( 11 Oct 2023 18:57 )  PTT:31.9 sec  Urinalysis Basic - ( 11 Oct 2023 18:57 )    Color: x / Appearance: x / SG: x / pH: x  Gluc: 111 mg/dL / Ketone: x  / Bili: x / Urobili: x   Blood: x / Protein: x / Nitrite: x   Leuk Esterase: x / RBC: x / WBC x   Sq Epi: x / Non Sq Epi: x / Bacteria: x        CAPILLARY BLOOD GLUCOSE                Standing medicine  acetaminophen     Tablet .. 650 milliGRAM(s) Oral every 6 hours PRN  albuterol    90 MICROgram(s) HFA Inhaler 2 Puff(s) Inhalation every 4 hours PRN  aluminum hydroxide/magnesium hydroxide/simethicone Suspension 30 milliLiter(s) Oral every 4 hours PRN  donepezil 10 milliGRAM(s) Oral at bedtime  furosemide    Tablet 40 milliGRAM(s) Oral daily  guaiFENesin  milliGRAM(s) Oral every 12 hours  heparin   Injectable 5000 Unit(s) SubCutaneous every 8 hours  latanoprost 0.005% Ophthalmic Solution 1 Drop(s) Both EYES at bedtime  melatonin 10 milliGRAM(s) Oral at bedtime  ondansetron Injectable 4 milliGRAM(s) IV Push every 8 hours PRN  piperacillin/tazobactam IVPB.. 3.375 Gram(s) IV Intermittent every 8 hours  predniSONE   Tablet 5 milliGRAM(s) Oral daily  primidone 100 milliGRAM(s) Oral two times a day  tamsulosin 0.4 milliGRAM(s) Oral at bedtime  timolol 0.5% Solution 1 Drop(s) Both EYES daily  tiotropium 2.5 MICROgram(s)/olodaterol 2.5 MICROgram(s) (STIOLTO) Inhaler 2 Puff(s) Inhalation daily  topiramate 50 milliGRAM(s) Oral two times a day

## 2023-10-13 NOTE — PATIENT PROFILE ADULT - FALL HARM RISK - TYPE OF ASSESSMENT
Device check personally reviewed by me. Normal device function on interrogation. See scanned interrogation document for complete details. Admission

## 2023-10-13 NOTE — PROGRESS NOTE ADULT - ASSESSMENT
A/P:    1.  Pneumonia  -pleuritic pain or pain due to cough   -abx, supportive care     2.  Heparin for DVT ppx    3.  Code status: Patient is DNR/DNI,

## 2023-10-13 NOTE — PROGRESS NOTE ADULT - SUBJECTIVE AND OBJECTIVE BOX
Date of service: 10-13-23 @ 13:19      pt seen and examined  no resp distress  afebrile  has some congestion    ROS: no fever or chills; denies dizziness, no HA, no abdominal pain, no diarrhea or constipation; no dysuria, no urinary frequency, no legs pain, no rashes    MEDICATIONS  (STANDING):  donepezil 10 milliGRAM(s) Oral at bedtime  furosemide    Tablet 40 milliGRAM(s) Oral daily  guaiFENesin  milliGRAM(s) Oral every 12 hours  heparin   Injectable 5000 Unit(s) SubCutaneous every 8 hours  latanoprost 0.005% Ophthalmic Solution 1 Drop(s) Both EYES at bedtime  melatonin 10 milliGRAM(s) Oral at bedtime  piperacillin/tazobactam IVPB.. 3.375 Gram(s) IV Intermittent every 8 hours  predniSONE   Tablet 5 milliGRAM(s) Oral daily  primidone 100 milliGRAM(s) Oral two times a day  tamsulosin 0.4 milliGRAM(s) Oral at bedtime  timolol 0.5% Solution 1 Drop(s) Both EYES daily  tiotropium 2.5 MICROgram(s)/olodaterol 2.5 MICROgram(s) (STIOLTO) Inhaler 2 Puff(s) Inhalation daily  topiramate 50 milliGRAM(s) Oral two times a day      Vital Signs Last 24 Hrs  T(C): 36.4 (13 Oct 2023 07:53), Max: 36.9 (12 Oct 2023 15:05)  T(F): 97.5 (13 Oct 2023 07:53), Max: 98.5 (12 Oct 2023 15:05)  HR: 73 (13 Oct 2023 07:53) (57 - 73)  BP: 123/56 (13 Oct 2023 07:53) (123/56 - 144/50)  BP(mean): --  RR: 18 (13 Oct 2023 07:53) (18 - 18)  SpO2: 89% (13 Oct 2023 07:53) (89% - 97%)    Parameters below as of 13 Oct 2023 07:53  Patient On (Oxygen Delivery Method): room air            PE:  Constitutional: frail looking  HEENT: NC/AT, EOMI, PERRLA, conjunctivae clear; ears and nose atraumatic; pharynx benign  Neck: supple; thyroid not palpable  Back: no tenderness  Respiratory: decreased breath sounds   Cardiovascular: S1S2 regular, no murmurs  Abdomen: soft, not tender, not distended, positive BS; liver and spleen WNL  Genitourinary: no suprapubic tenderness  Lymphatic: no LN palpable  Musculoskeletal: no muscle tenderness, no joint swelling or tenderness  Extremities: no pedal edema  Neurological/ Psychiatric:  moving all extremities  Skin: no rashes; no palpable lesions    Labs: all available labs reviewed                                   15.0   9.24  )-----------( 255      ( 11 Oct 2023 18:57 )             46.7     10-11    138  |  104  |  28<H>  ----------------------------<  111<H>  4.4   |  31  |  1.09    Ca    9.0      11 Oct 2023 18:57  Mg     2.2     10-11    TPro  7.5  /  Alb  3.2<L>  /  TBili  0.4  /  DBili  x   /  AST  15  /  ALT  27  /  AlkPhos  144<H>  10-11             Urinalysis Basic - ( 11 Oct 2023 18:57 )    Color: x / Appearance: x / SG: x / pH: x  Gluc: 111 mg/dL / Ketone: x  / Bili: x / Urobili: x   Blood: x / Protein: x / Nitrite: x   Leuk Esterase: x / RBC: x / WBC x   Sq Epi: x / Non Sq Epi: x / Bacteria: x    Culture - Blood (10.11.23 @ 20:18)   Specimen Source: .Blood None  Culture Results:   No growth at 24 hours  Culture - Blood (10.11.23 @ 18:57)   Specimen Source: .Blood Blood-Peripheral  Culture Results:   No growth at 24 hours      Radiology: all available radiological tests reviewed  < from: CT Abdomen and Pelvis No Cont (10.11.23 @ 19:58) >    ACC: [id number] EXAM:  CT ABDOMEN AND PELVIS   ORDERED BY: MOO SHAY     ACC: [id number] EXAM:  CT CHEST   ORDERED BY: MOO SHAY     PROCEDURE DATE:  10/11/2023          INTERPRETATION:  CLINICAL INFORMATION: Chest pain with questionable fall    COMPARISON: 8/22/2021    CONTRAST/COMPLICATIONS:  IV Contrast: NONE  Oral Contrast: NONE  Complications: None reported at time of study completion    PROCEDURE:  CT of the Chest, Abdomen and Pelvis was performed.  Sagittal and coronal reformats were performed.    FINDINGS:  CHEST:  LUNGS AND LARGE AIRWAYS: Patent central airways. No pulmonary nodules.   Emphysema. Infiltrate in the right lower lobe at the lung base posterior   medially  PLEURA: No pleural effusion.  VESSELS: Atherosclerotic changes of the aorta and coronary arteries.   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 No pericardial effusion.  MEDIASTINUM AND EMMA: No lymphadenopathy.  CHEST WALL AND LOWER NECK: Within normal limits.    ABDOMEN AND PELVIS:  LIVER: Multiple hepatic cysts  BILE DUCTS: Normal caliber.  GALLBLADDER: Within normal limits.  SPLEEN: Within normal limits.  PANCREAS: Within normal limits.  ADRENALS: Within normal limits.  KIDNEYS/URETERS: No hydronephrosis. Exophytic right upper pole cyst and   other small hypodensities too small to characterize. Small hyperdense   lesion at the lower pole of the right kidney too small to characterize.   Calcifications in the kidneys bilaterally are punctate and may be   vascular versus nonobstructive calculi    BLADDER: Mild diffuse bladder wall thickening likely due to outlet   obstruction. A small bladder calculus again seen  REPRODUCTIVE ORGANS: Fiducial markers in the prostate    BOWEL: No bowel obstruction. Appendix is not visualized. No evidence of   inflammation in the pericecal region There is a suture line in the   proximal sigmoid colon. There is diverticulosis without diverticulitis  PERITONEUM: No ascites.  VESSELS: Atherosclerotic changes.  RETROPERITONEUM/LYMPH NODES: No lymphadenopathy.  ABDOMINAL WALL: Within normal limits.  BONES: Degenerative changes.. ORIF of the left hip is seen with a single   screw. There is severe levoscoliosis of the upper lumbar spine    IMPRESSION:  Emphysema. Right lower lobe infiltrate  Thick-walled bladder with bladder calculus similar to the prior study  Hepatic and renal cysts  Diverticulosis without diverticulitis  No evidence of acute traumatic injury    --- End of Report ---        < end of copied text >    Advanced directives addressed: full resuscitation

## 2023-10-14 PROCEDURE — 93010 ELECTROCARDIOGRAM REPORT: CPT

## 2023-10-14 PROCEDURE — 99232 SBSQ HOSP IP/OBS MODERATE 35: CPT

## 2023-10-14 RX ADMIN — LATANOPROST 1 DROP(S): 0.05 SOLUTION/ DROPS OPHTHALMIC; TOPICAL at 21:56

## 2023-10-14 RX ADMIN — DONEPEZIL HYDROCHLORIDE 10 MILLIGRAM(S): 10 TABLET, FILM COATED ORAL at 21:55

## 2023-10-14 RX ADMIN — PIPERACILLIN AND TAZOBACTAM 25 GRAM(S): 4; .5 INJECTION, POWDER, LYOPHILIZED, FOR SOLUTION INTRAVENOUS at 21:55

## 2023-10-14 RX ADMIN — Medication 5 MILLIGRAM(S): at 13:17

## 2023-10-14 RX ADMIN — PRIMIDONE 100 MILLIGRAM(S): 250 TABLET ORAL at 21:56

## 2023-10-14 RX ADMIN — Medication 10 MILLIGRAM(S): at 21:56

## 2023-10-14 RX ADMIN — HEPARIN SODIUM 5000 UNIT(S): 5000 INJECTION INTRAVENOUS; SUBCUTANEOUS at 05:05

## 2023-10-14 RX ADMIN — Medication 600 MILLIGRAM(S): at 13:24

## 2023-10-14 RX ADMIN — Medication 1 DROP(S): at 13:24

## 2023-10-14 RX ADMIN — PIPERACILLIN AND TAZOBACTAM 25 GRAM(S): 4; .5 INJECTION, POWDER, LYOPHILIZED, FOR SOLUTION INTRAVENOUS at 05:05

## 2023-10-14 RX ADMIN — HEPARIN SODIUM 5000 UNIT(S): 5000 INJECTION INTRAVENOUS; SUBCUTANEOUS at 13:35

## 2023-10-14 RX ADMIN — HEPARIN SODIUM 5000 UNIT(S): 5000 INJECTION INTRAVENOUS; SUBCUTANEOUS at 21:55

## 2023-10-14 RX ADMIN — Medication 50 MILLIGRAM(S): at 13:18

## 2023-10-14 RX ADMIN — Medication 600 MILLIGRAM(S): at 21:56

## 2023-10-14 RX ADMIN — TAMSULOSIN HYDROCHLORIDE 0.4 MILLIGRAM(S): 0.4 CAPSULE ORAL at 21:56

## 2023-10-14 RX ADMIN — Medication 50 MILLIGRAM(S): at 21:56

## 2023-10-14 RX ADMIN — TIOTROPIUM BROMIDE AND OLODATEROL 2 PUFF(S): 3.124; 2.736 SPRAY, METERED RESPIRATORY (INHALATION) at 08:53

## 2023-10-14 RX ADMIN — PRIMIDONE 100 MILLIGRAM(S): 250 TABLET ORAL at 13:22

## 2023-10-14 RX ADMIN — PIPERACILLIN AND TAZOBACTAM 25 GRAM(S): 4; .5 INJECTION, POWDER, LYOPHILIZED, FOR SOLUTION INTRAVENOUS at 13:26

## 2023-10-14 RX ADMIN — Medication 40 MILLIGRAM(S): at 13:24

## 2023-10-14 NOTE — PROGRESS NOTE ADULT - ASSESSMENT
A/P:    1.  Pneumonia  Suspected gram negative rods  -pleuritic pain or pain due to cough   -Continue IV zosyn  -Dr Sultana follow up appreciated  -Aspiration precautions  - supportive care     2.  COPD-stable    3.  Code status: Patient is DNR/DNI,

## 2023-10-14 NOTE — PROGRESS NOTE ADULT - SUBJECTIVE AND OBJECTIVE BOX
HPI:  89 y/o Male with PMHx of COPD, HLD, and dementia BIBEMS to the ED from [facility] with complain of left-sided chest pain worse with inhalation, unknown time of onset.    10/14/23: Patient seen and examined. No new issues.     ROS- unable to get it as pt has dementia     Vital Signs Last 24 Hrs  T(C): 36.8 (14 Oct 2023 07:31), Max: 37.8 (13 Oct 2023 21:40)  T(F): 98.2 (14 Oct 2023 07:31), Max: 100.1 (13 Oct 2023 21:40)  HR: 75 (14 Oct 2023 08:50) (75 - 88)  BP: 101/47 (14 Oct 2023 07:31) (101/47 - 149/60)  BP(mean): --  RR: 18 (14 Oct 2023 07:31) (18 - 18)  SpO2: 94% (14 Oct 2023 08:50) (91% - 94%)    Parameters below as of 14 Oct 2023 08:50  Patient On (Oxygen Delivery Method): nasal cannula          GENERAL: comfortable   HEAD:  Atraumatic, Normocephalic  EYES: EOMI, PERRLA, conjunctiva and sclera clear  HEENT: Moist mucous membranes  NECK: Supple, No JVD  NERVOUS SYSTEM:  Alert, confused  CHEST/LUNG: AEEB, b/l crackles present   HEART:S1S2 normal, no murmer  ABDOMEN: Soft, Nontender, Nondistended; Bowel sounds present  GENITOURINARY- Voiding, no palpable bladder  EXTREMITIES:  2+ Peripheral Pulses, No clubbing, cyanosis, or edema  MUSCULOSKELETAL No muscle tenderness, Muscle tone normal, No joint tenderness, no Joint swelling, Joint range of motion-normal  SKIN-no rash, no lesion        LABS:                        15.0   9.24  )-----------( 255      ( 11 Oct 2023 18:57 )             46.7     10-11    138  |  104  |  28<H>  ----------------------------<  111<H>  4.4   |  31  |  1.09    Ca    9.0      11 Oct 2023 18:57  Mg     2.2     10-11    TPro  7.5  /  Alb  3.2<L>  /  TBili  0.4  /  DBili  x   /  AST  15  /  ALT  27  /  AlkPhos  144<H>  10-11    PT/INR - ( 11 Oct 2023 18:57 )   PT: 10.9 sec;   INR: 0.96 ratio         PTT - ( 11 Oct 2023 18:57 )  PTT:31.9 sec  Urinalysis Basic - ( 11 Oct 2023 18:57 )    Color: x / Appearance: x / SG: x / pH: x  Gluc: 111 mg/dL / Ketone: x  / Bili: x / Urobili: x   Blood: x / Protein: x / Nitrite: x   Leuk Esterase: x / RBC: x / WBC x   Sq Epi: x / Non Sq Epi: x / Bacteria: x        CAPILLARY BLOOD GLUCOSE                Standing medicine  acetaminophen     Tablet .. 650 milliGRAM(s) Oral every 6 hours PRN  albuterol    90 MICROgram(s) HFA Inhaler 2 Puff(s) Inhalation every 4 hours PRN  aluminum hydroxide/magnesium hydroxide/simethicone Suspension 30 milliLiter(s) Oral every 4 hours PRN  donepezil 10 milliGRAM(s) Oral at bedtime  furosemide    Tablet 40 milliGRAM(s) Oral daily  guaiFENesin  milliGRAM(s) Oral every 12 hours  heparin   Injectable 5000 Unit(s) SubCutaneous every 8 hours  latanoprost 0.005% Ophthalmic Solution 1 Drop(s) Both EYES at bedtime  melatonin 10 milliGRAM(s) Oral at bedtime  ondansetron Injectable 4 milliGRAM(s) IV Push every 8 hours PRN  piperacillin/tazobactam IVPB.. 3.375 Gram(s) IV Intermittent every 8 hours  predniSONE   Tablet 5 milliGRAM(s) Oral daily  primidone 100 milliGRAM(s) Oral two times a day  tamsulosin 0.4 milliGRAM(s) Oral at bedtime  timolol 0.5% Solution 1 Drop(s) Both EYES daily  tiotropium 2.5 MICROgram(s)/olodaterol 2.5 MICROgram(s) (STIOLTO) Inhaler 2 Puff(s) Inhalation daily  topiramate 50 milliGRAM(s) Oral two times a day

## 2023-10-15 PROCEDURE — 99232 SBSQ HOSP IP/OBS MODERATE 35: CPT

## 2023-10-15 RX ADMIN — TAMSULOSIN HYDROCHLORIDE 0.4 MILLIGRAM(S): 0.4 CAPSULE ORAL at 22:15

## 2023-10-15 RX ADMIN — PIPERACILLIN AND TAZOBACTAM 25 GRAM(S): 4; .5 INJECTION, POWDER, LYOPHILIZED, FOR SOLUTION INTRAVENOUS at 22:14

## 2023-10-15 RX ADMIN — HEPARIN SODIUM 5000 UNIT(S): 5000 INJECTION INTRAVENOUS; SUBCUTANEOUS at 22:14

## 2023-10-15 RX ADMIN — HEPARIN SODIUM 5000 UNIT(S): 5000 INJECTION INTRAVENOUS; SUBCUTANEOUS at 05:07

## 2023-10-15 RX ADMIN — Medication 5 MILLIGRAM(S): at 11:18

## 2023-10-15 RX ADMIN — Medication 40 MILLIGRAM(S): at 11:18

## 2023-10-15 RX ADMIN — Medication 1 DROP(S): at 11:19

## 2023-10-15 RX ADMIN — PIPERACILLIN AND TAZOBACTAM 25 GRAM(S): 4; .5 INJECTION, POWDER, LYOPHILIZED, FOR SOLUTION INTRAVENOUS at 05:08

## 2023-10-15 RX ADMIN — DONEPEZIL HYDROCHLORIDE 10 MILLIGRAM(S): 10 TABLET, FILM COATED ORAL at 22:15

## 2023-10-15 RX ADMIN — PRIMIDONE 100 MILLIGRAM(S): 250 TABLET ORAL at 22:14

## 2023-10-15 RX ADMIN — Medication 50 MILLIGRAM(S): at 11:18

## 2023-10-15 RX ADMIN — Medication 50 MILLIGRAM(S): at 22:15

## 2023-10-15 RX ADMIN — PRIMIDONE 100 MILLIGRAM(S): 250 TABLET ORAL at 11:19

## 2023-10-15 RX ADMIN — HEPARIN SODIUM 5000 UNIT(S): 5000 INJECTION INTRAVENOUS; SUBCUTANEOUS at 18:20

## 2023-10-15 RX ADMIN — TIOTROPIUM BROMIDE AND OLODATEROL 2 PUFF(S): 3.124; 2.736 SPRAY, METERED RESPIRATORY (INHALATION) at 08:37

## 2023-10-15 RX ADMIN — PIPERACILLIN AND TAZOBACTAM 25 GRAM(S): 4; .5 INJECTION, POWDER, LYOPHILIZED, FOR SOLUTION INTRAVENOUS at 14:39

## 2023-10-15 RX ADMIN — Medication 600 MILLIGRAM(S): at 11:19

## 2023-10-15 RX ADMIN — LATANOPROST 1 DROP(S): 0.05 SOLUTION/ DROPS OPHTHALMIC; TOPICAL at 22:15

## 2023-10-15 RX ADMIN — Medication 600 MILLIGRAM(S): at 22:15

## 2023-10-15 NOTE — PROGRESS NOTE ADULT - ASSESSMENT
A/P:    1.  Pneumonia  Suspected gram negative rods  -pleuritic pain or pain due to cough-resolved  -Continue IV zosyn  -Dr Sultana follow up appreciated  -Aspiration precautions  - supportive care  -Hold lasix     2.  COPD-stable    3.  Code status: Patient is DNR/DNI,

## 2023-10-15 NOTE — PROGRESS NOTE ADULT - SUBJECTIVE AND OBJECTIVE BOX
HPI:  89 y/o Male with PMHx of COPD, HLD, and dementia BIBEMS to the ED from [facility] with complain of left-sided chest pain worse with inhalation, unknown time of onset.    10/14/23: Patient seen and examined. No new issues.   10/15/23: Awake and alert today. Discussed with wife and daughter at bed side regarding management and d/c plan.     ROS- unable to get it as pt has dementia     Vital Signs Last 24 Hrs  T(C): 36.4 (15 Oct 2023 08:19), Max: 36.7 (14 Oct 2023 16:44)  T(F): 97.5 (15 Oct 2023 08:19), Max: 98.1 (14 Oct 2023 16:44)  HR: 76 (15 Oct 2023 08:38) (69 - 92)  BP: 132/81 (15 Oct 2023 08:19) (108/61 - 132/81)  BP(mean): --  RR: 18 (15 Oct 2023 08:19) (18 - 18)  SpO2: 95% (15 Oct 2023 08:19) (94% - 96%)    Parameters below as of 15 Oct 2023 08:38  Patient On (Oxygen Delivery Method): nasal cannula          GENERAL: comfortable   HEAD:  Atraumatic, Normocephalic  EYES: EOMI, PERRLA, conjunctiva and sclera clear  HEENT: Moist mucous membranes  NECK: Supple, No JVD  NERVOUS SYSTEM:  Alert, confused  CHEST/LUNG: AEEB, b/l crackles present   HEART:S1S2 normal, no murmer  ABDOMEN: Soft, Nontender, Nondistended; Bowel sounds present  GENITOURINARY- Voiding, no palpable bladder  EXTREMITIES:  2+ Peripheral Pulses, No clubbing, cyanosis, or edema  MUSCULOSKELETAL No muscle tenderness, Muscle tone normal, No joint tenderness, no Joint swelling, Joint range of motion-normal  SKIN-no rash, no lesion        LABS:                        15.0   9.24  )-----------( 255      ( 11 Oct 2023 18:57 )             46.7     10-11    138  |  104  |  28<H>  ----------------------------<  111<H>  4.4   |  31  |  1.09    Ca    9.0      11 Oct 2023 18:57  Mg     2.2     10-11    TPro  7.5  /  Alb  3.2<L>  /  TBili  0.4  /  DBili  x   /  AST  15  /  ALT  27  /  AlkPhos  144<H>  10-11    PT/INR - ( 11 Oct 2023 18:57 )   PT: 10.9 sec;   INR: 0.96 ratio         PTT - ( 11 Oct 2023 18:57 )  PTT:31.9 sec  Urinalysis Basic - ( 11 Oct 2023 18:57 )    Color: x / Appearance: x / SG: x / pH: x  Gluc: 111 mg/dL / Ketone: x  / Bili: x / Urobili: x   Blood: x / Protein: x / Nitrite: x   Leuk Esterase: x / RBC: x / WBC x   Sq Epi: x / Non Sq Epi: x / Bacteria: x              Standing medicine  acetaminophen     Tablet .. 650 milliGRAM(s) Oral every 6 hours PRN  albuterol    90 MICROgram(s) HFA Inhaler 2 Puff(s) Inhalation every 4 hours PRN  aluminum hydroxide/magnesium hydroxide/simethicone Suspension 30 milliLiter(s) Oral every 4 hours PRN  donepezil 10 milliGRAM(s) Oral at bedtime  furosemide    Tablet 40 milliGRAM(s) Oral daily  guaiFENesin  milliGRAM(s) Oral every 12 hours  heparin   Injectable 5000 Unit(s) SubCutaneous every 8 hours  latanoprost 0.005% Ophthalmic Solution 1 Drop(s) Both EYES at bedtime  melatonin 10 milliGRAM(s) Oral at bedtime  ondansetron Injectable 4 milliGRAM(s) IV Push every 8 hours PRN  piperacillin/tazobactam IVPB.. 3.375 Gram(s) IV Intermittent every 8 hours  predniSONE   Tablet 5 milliGRAM(s) Oral daily  primidone 100 milliGRAM(s) Oral two times a day  tamsulosin 0.4 milliGRAM(s) Oral at bedtime  timolol 0.5% Solution 1 Drop(s) Both EYES daily  tiotropium 2.5 MICROgram(s)/olodaterol 2.5 MICROgram(s) (STIOLTO) Inhaler 2 Puff(s) Inhalation daily  topiramate 50 milliGRAM(s) Oral two times a day

## 2023-10-16 LAB
ANION GAP SERPL CALC-SCNC: 6 MMOL/L — SIGNIFICANT CHANGE UP (ref 5–17)
BUN SERPL-MCNC: 36 MG/DL — HIGH (ref 7–23)
CALCIUM SERPL-MCNC: 9 MG/DL — SIGNIFICANT CHANGE UP (ref 8.5–10.1)
CHLORIDE SERPL-SCNC: 109 MMOL/L — HIGH (ref 96–108)
CO2 SERPL-SCNC: 27 MMOL/L — SIGNIFICANT CHANGE UP (ref 22–31)
CREAT SERPL-MCNC: 0.96 MG/DL — SIGNIFICANT CHANGE UP (ref 0.5–1.3)
EGFR: 76 ML/MIN/1.73M2 — SIGNIFICANT CHANGE UP
GLUCOSE SERPL-MCNC: 98 MG/DL — SIGNIFICANT CHANGE UP (ref 70–99)
HCT VFR BLD CALC: 41.4 % — SIGNIFICANT CHANGE UP (ref 39–50)
HGB BLD-MCNC: 13.3 G/DL — SIGNIFICANT CHANGE UP (ref 13–17)
MCHC RBC-ENTMCNC: 30 PG — SIGNIFICANT CHANGE UP (ref 27–34)
MCHC RBC-ENTMCNC: 32.1 GM/DL — SIGNIFICANT CHANGE UP (ref 32–36)
MCV RBC AUTO: 93.5 FL — SIGNIFICANT CHANGE UP (ref 80–100)
PLATELET # BLD AUTO: 230 K/UL — SIGNIFICANT CHANGE UP (ref 150–400)
POTASSIUM SERPL-MCNC: 3.5 MMOL/L — SIGNIFICANT CHANGE UP (ref 3.5–5.3)
POTASSIUM SERPL-SCNC: 3.5 MMOL/L — SIGNIFICANT CHANGE UP (ref 3.5–5.3)
RBC # BLD: 4.43 M/UL — SIGNIFICANT CHANGE UP (ref 4.2–5.8)
RBC # FLD: 13.6 % — SIGNIFICANT CHANGE UP (ref 10.3–14.5)
SODIUM SERPL-SCNC: 142 MMOL/L — SIGNIFICANT CHANGE UP (ref 135–145)
WBC # BLD: 8.06 K/UL — SIGNIFICANT CHANGE UP (ref 3.8–10.5)
WBC # FLD AUTO: 8.06 K/UL — SIGNIFICANT CHANGE UP (ref 3.8–10.5)

## 2023-10-16 PROCEDURE — 99232 SBSQ HOSP IP/OBS MODERATE 35: CPT

## 2023-10-16 RX ADMIN — DONEPEZIL HYDROCHLORIDE 10 MILLIGRAM(S): 10 TABLET, FILM COATED ORAL at 22:20

## 2023-10-16 RX ADMIN — TIOTROPIUM BROMIDE AND OLODATEROL 2 PUFF(S): 3.124; 2.736 SPRAY, METERED RESPIRATORY (INHALATION) at 08:28

## 2023-10-16 RX ADMIN — Medication 600 MILLIGRAM(S): at 22:17

## 2023-10-16 RX ADMIN — PRIMIDONE 100 MILLIGRAM(S): 250 TABLET ORAL at 22:21

## 2023-10-16 RX ADMIN — PIPERACILLIN AND TAZOBACTAM 25 GRAM(S): 4; .5 INJECTION, POWDER, LYOPHILIZED, FOR SOLUTION INTRAVENOUS at 13:15

## 2023-10-16 RX ADMIN — Medication 50 MILLIGRAM(S): at 13:14

## 2023-10-16 RX ADMIN — LATANOPROST 1 DROP(S): 0.05 SOLUTION/ DROPS OPHTHALMIC; TOPICAL at 22:21

## 2023-10-16 RX ADMIN — HEPARIN SODIUM 5000 UNIT(S): 5000 INJECTION INTRAVENOUS; SUBCUTANEOUS at 05:49

## 2023-10-16 RX ADMIN — HEPARIN SODIUM 5000 UNIT(S): 5000 INJECTION INTRAVENOUS; SUBCUTANEOUS at 22:18

## 2023-10-16 RX ADMIN — Medication 600 MILLIGRAM(S): at 13:13

## 2023-10-16 RX ADMIN — Medication 1 DROP(S): at 13:14

## 2023-10-16 RX ADMIN — TAMSULOSIN HYDROCHLORIDE 0.4 MILLIGRAM(S): 0.4 CAPSULE ORAL at 22:18

## 2023-10-16 RX ADMIN — Medication 50 MILLIGRAM(S): at 22:20

## 2023-10-16 RX ADMIN — PRIMIDONE 100 MILLIGRAM(S): 250 TABLET ORAL at 13:14

## 2023-10-16 RX ADMIN — PIPERACILLIN AND TAZOBACTAM 25 GRAM(S): 4; .5 INJECTION, POWDER, LYOPHILIZED, FOR SOLUTION INTRAVENOUS at 05:48

## 2023-10-16 RX ADMIN — PIPERACILLIN AND TAZOBACTAM 25 GRAM(S): 4; .5 INJECTION, POWDER, LYOPHILIZED, FOR SOLUTION INTRAVENOUS at 22:12

## 2023-10-16 RX ADMIN — Medication 5 MILLIGRAM(S): at 13:13

## 2023-10-16 RX ADMIN — HEPARIN SODIUM 5000 UNIT(S): 5000 INJECTION INTRAVENOUS; SUBCUTANEOUS at 13:15

## 2023-10-16 NOTE — PHYSICAL THERAPY INITIAL EVALUATION ADULT - PERTINENT HX OF CURRENT PROBLEM, REHAB EVAL
89 y/o Male with PMHx of COPD, HLD, and dementia BIBEMS to the ED from [facility] with complain of left-sided chest pain worse with inhalation, unknown time of onset.    CT CHEST/AP: Emphysema. Right lower lobe infiltrate. Thick-walled bladder with bladder calculus similar to the prior study. Hepatic and renal cysts. Diverticulosis without diverticulitis. No evidence of acute traumatic injury.  CT Head: No acute intracranial hemorrhage or calvarial fracture.  CT cervical spine: No acute cervical spine fracture or evidence of   traumatic malalignment.  CXR:  RIGHT infrahilar airspace of dense consolidation. Continued surveillance and follow-up radiograph recommended to complete resolution to exclude other etiologies such as  carcinoma.

## 2023-10-16 NOTE — PHYSICAL THERAPY INITIAL EVALUATION ADULT - NSPTDISCHREC_GEN_A_CORE
subacute rehab vs return to [facility] with assist and In house PT. pending further mobility./Sub-acute Rehab

## 2023-10-16 NOTE — PROGRESS NOTE ADULT - SUBJECTIVE AND OBJECTIVE BOX
HPI:  89 y/o Male with PMHx of COPD, HLD, and dementia BIBEMS to the ED from [facility] with complain of left-sided chest pain worse with inhalation, unknown time of onset.    10/14/23: Patient seen and examined. No new issues.   10/15/23: Awake and alert today. Discussed with wife and daughter at bed side regarding management and d/c plan.   10/16/23: No new issues.     ROS- unable to get it as pt has dementia     Vital Signs Last 24 Hrs  T(C): 36.3 (16 Oct 2023 08:02), Max: 36.5 (15 Oct 2023 18:26)  T(F): 97.4 (16 Oct 2023 08:02), Max: 97.7 (15 Oct 2023 18:26)  HR: 68 (16 Oct 2023 08:28) (62 - 93)  BP: 141/63 (16 Oct 2023 08:02) (110/72 - 148/60)  BP(mean): --  RR: 18 (16 Oct 2023 08:02) (18 - 18)  SpO2: 97% (16 Oct 2023 08:28) (97% - 98%)    Parameters below as of 16 Oct 2023 08:28  Patient On (Oxygen Delivery Method): nasal cannula, 3 Lpm          GENERAL: comfortable   HEAD:  Atraumatic, Normocephalic  EYES: EOMI, PERRLA, conjunctiva and sclera clear  HEENT: Moist mucous membranes  NECK: Supple, No JVD  NERVOUS SYSTEM:  Alert, confused  CHEST/LUNG: AEEB, b/l crackles present   HEART:S1S2 normal, no murmer  ABDOMEN: Soft, Nontender, Nondistended; Bowel sounds present  GENITOURINARY- Voiding, no palpable bladder  EXTREMITIES:  2+ Peripheral Pulses, No clubbing, cyanosis, or edema  MUSCULOSKELETAL No muscle tenderness, Muscle tone normal, No joint tenderness, no Joint swelling, Joint range of motion-normal  SKIN-no rash, no lesion        LABS:                                         13.3   8.06  )-----------( 230      ( 16 Oct 2023 07:01 )             41.4     16 Oct 2023 07:01    142    |  109    |  36     ----------------------------<  98     3.5     |  27     |  0.96     Ca    9.0        16 Oct 2023 07:01          Standing medicine    acetaminophen     Tablet .. 650 milliGRAM(s) Oral every 6 hours PRN  albuterol    90 MICROgram(s) HFA Inhaler 2 Puff(s) Inhalation every 4 hours PRN  aluminum hydroxide/magnesium hydroxide/simethicone Suspension 30 milliLiter(s) Oral every 4 hours PRN  donepezil 10 milliGRAM(s) Oral at bedtime  furosemide    Tablet 40 milliGRAM(s) Oral daily  guaiFENesin  milliGRAM(s) Oral every 12 hours  heparin   Injectable 5000 Unit(s) SubCutaneous every 8 hours  latanoprost 0.005% Ophthalmic Solution 1 Drop(s) Both EYES at bedtime  melatonin 10 milliGRAM(s) Oral at bedtime  ondansetron Injectable 4 milliGRAM(s) IV Push every 8 hours PRN  piperacillin/tazobactam IVPB.. 3.375 Gram(s) IV Intermittent every 8 hours  predniSONE   Tablet 5 milliGRAM(s) Oral daily  primidone 100 milliGRAM(s) Oral two times a day  tamsulosin 0.4 milliGRAM(s) Oral at bedtime  timolol 0.5% Solution 1 Drop(s) Both EYES daily  tiotropium 2.5 MICROgram(s)/olodaterol 2.5 MICROgram(s) (STIOLTO) Inhaler 2 Puff(s) Inhalation daily  topiramate 50 milliGRAM(s) Oral two times a day

## 2023-10-16 NOTE — PHYSICAL THERAPY INITIAL EVALUATION ADULT - GENERAL OBSERVATIONS, REHAB EVAL
Pt seen for 45min PT Eval. Pt rec'd semi supine in bed in NAD, +Sup O2, +IV. Pt requires min AX1 for all mobility, trans and amb bed>chair with RW. Pt left in chair in NAD, all needs met, +alarm, RN aware.

## 2023-10-17 DIAGNOSIS — F03.90 UNSPECIFIED DEMENTIA WITHOUT BEHAVIORAL DISTURBANCE: ICD-10-CM

## 2023-10-17 LAB
CULTURE RESULTS: SIGNIFICANT CHANGE UP
SPECIMEN SOURCE: SIGNIFICANT CHANGE UP

## 2023-10-17 PROCEDURE — 99497 ADVNCD CARE PLAN 30 MIN: CPT | Mod: 25

## 2023-10-17 PROCEDURE — 99232 SBSQ HOSP IP/OBS MODERATE 35: CPT

## 2023-10-17 PROCEDURE — 99223 1ST HOSP IP/OBS HIGH 75: CPT

## 2023-10-17 RX ADMIN — TAMSULOSIN HYDROCHLORIDE 0.4 MILLIGRAM(S): 0.4 CAPSULE ORAL at 21:22

## 2023-10-17 RX ADMIN — Medication 50 MILLIGRAM(S): at 21:23

## 2023-10-17 RX ADMIN — DONEPEZIL HYDROCHLORIDE 10 MILLIGRAM(S): 10 TABLET, FILM COATED ORAL at 21:23

## 2023-10-17 RX ADMIN — TIOTROPIUM BROMIDE AND OLODATEROL 2 PUFF(S): 3.124; 2.736 SPRAY, METERED RESPIRATORY (INHALATION) at 08:08

## 2023-10-17 RX ADMIN — PRIMIDONE 100 MILLIGRAM(S): 250 TABLET ORAL at 10:21

## 2023-10-17 RX ADMIN — LATANOPROST 1 DROP(S): 0.05 SOLUTION/ DROPS OPHTHALMIC; TOPICAL at 21:24

## 2023-10-17 RX ADMIN — Medication 600 MILLIGRAM(S): at 21:22

## 2023-10-17 RX ADMIN — Medication 1 DROP(S): at 10:22

## 2023-10-17 RX ADMIN — PRIMIDONE 100 MILLIGRAM(S): 250 TABLET ORAL at 21:23

## 2023-10-17 RX ADMIN — PIPERACILLIN AND TAZOBACTAM 25 GRAM(S): 4; .5 INJECTION, POWDER, LYOPHILIZED, FOR SOLUTION INTRAVENOUS at 05:31

## 2023-10-17 RX ADMIN — Medication 600 MILLIGRAM(S): at 10:20

## 2023-10-17 RX ADMIN — HEPARIN SODIUM 5000 UNIT(S): 5000 INJECTION INTRAVENOUS; SUBCUTANEOUS at 21:23

## 2023-10-17 RX ADMIN — HEPARIN SODIUM 5000 UNIT(S): 5000 INJECTION INTRAVENOUS; SUBCUTANEOUS at 14:23

## 2023-10-17 RX ADMIN — Medication 5 MILLIGRAM(S): at 10:21

## 2023-10-17 RX ADMIN — PIPERACILLIN AND TAZOBACTAM 25 GRAM(S): 4; .5 INJECTION, POWDER, LYOPHILIZED, FOR SOLUTION INTRAVENOUS at 14:12

## 2023-10-17 RX ADMIN — HEPARIN SODIUM 5000 UNIT(S): 5000 INJECTION INTRAVENOUS; SUBCUTANEOUS at 05:32

## 2023-10-17 RX ADMIN — PIPERACILLIN AND TAZOBACTAM 25 GRAM(S): 4; .5 INJECTION, POWDER, LYOPHILIZED, FOR SOLUTION INTRAVENOUS at 21:23

## 2023-10-17 RX ADMIN — Medication 50 MILLIGRAM(S): at 10:20

## 2023-10-17 NOTE — CONSULT NOTE ADULT - ASSESSMENT
Pt is a 88y old Male with hx of COPD, HLD, and dementia BIBEMS to the ED from [facility] with complain of left-sided chest pain worse with inhalation, unknown time of onset. Patient is a limited historian due to Hx of dementia. In the ED patient is found to have pneumonia.       1. Pneumonia  -IV zosyn  -ID following  -aspiration precautions   -supplemental O2, on 3LNC  -OOB to chair as tolerated  -PT consult      Process of Care   --Reviewed dx/treatment problems and alignment with Goals of Care     Physical Aspects of Care   --Pain   patient denies at this time   c/w current management     --Bowel Regimen   denies constipation   risk for constipation d/t immobility   daily dulcolax as needed    --Dyspnea   No SOB at this time   comfortable and in NAD   on 3LNC    --Nausea Vomiting   denies     --Weakness   PT as tolerated    OOB as tolerated        Psychological and Psychiatric Aspects of Care:    --Grief/ Bereavement: emotional support provided   --Hx of psychiatric dx: none   --Pastoral Care Available PRN         Social Aspects of Care   --SW involved         Cultural Aspects   -Primary Language: English        Goals of Care: Spoke with patient's wife, who confirmed limitations of DNR/DNI. New MOLST filled out reflecting these limitations.          We discussed Palliative Care team being a supportive team when a patient has ongoing illnesses.  We also discussed that it is not an end of life care service, but can help navigate symptoms and emotional support throughout their hospital stay here.           Ethical and Legal Aspects: NA           Capacity- A&Ox2/history of dementia/patient does not have medical decision making capacity          HCP/Surrogate:  Felicia Landrum, spouse [phone]      Code Status: DNR/DNI      MOLST: new MOLST filled out 10/17 reflecting limitations DNR/DNI      Dispo Plan: d/c to [facility]           Discussed With: Dr. Simeon & Dr. Ollie Coronado coordinated with attending and SW and RN            Time Spent: 75 minutes including the care, coordination and counseling of this patient, 50% of which was spent coordinating and counseling.  Pt is a 88y old Male with hx of COPD, HLD, and dementia BIBEMS to the ED from [facility] with complain of left-sided chest pain worse with inhalation, unknown time of onset. Patient is a limited historian due to Hx of dementia. In the ED patient is found to have pneumonia.       1. Pneumonia  -IV zosyn  -ID following  -aspiration precautions   -supplemental O2, on 3LNC  -OOB to chair as tolerated  -PT consult      Process of Care   --Reviewed dx/treatment problems and alignment with Goals of Care     Physical Aspects of Care   --Pain   patient denies at this time   c/w current management     --Bowel Regimen   denies constipation   risk for constipation d/t immobility   daily dulcolax as needed    --Dyspnea   No SOB at this time   comfortable and in NAD   on 3LNC    --Nausea Vomiting   denies     --Weakness   PT as tolerated    OOB as tolerated        Psychological and Psychiatric Aspects of Care:    --Grief/ Bereavement: emotional support provided   --Hx of psychiatric dx: none   --Pastoral Care Available PRN         Social Aspects of Care   --SW involved         Cultural Aspects   -Primary Language: English        Goals of Care: Spoke with patient's wife, who confirmed limitations of DNR/DNI. New MOLST filled out reflecting these limitations.          We discussed Palliative Care team being a supportive team when a patient has ongoing illnesses.  We also discussed that it is not an end of life care service, but can help navigate symptoms and emotional support throughout their hospital stay here.           Ethical and Legal Aspects: NA           Capacity- A&Ox2/history of dementia/patient does not have medical decision making capacity          HCP/Surrogate:  Felicia Landrum, spouse [phone]      Code Status: DNR/DNI      MOLST: new MOLST filled out 10/17 reflecting limitations DNR/DNI      Dispo Plan: d/c to [facility]           Discussed With: Dr. Simeon & Dr. Levin          Case coordinated with attending and SW and RN            Time Spent: 75 minutes including the care, coordination and counseling of this patient, 50% of which was spent coordinating and counseling.       Palliative will sign off at this time. GOC clear, continue current treatments with limitations of DNR/DNI. Please re-consult palliative if needed.

## 2023-10-17 NOTE — CONSULT NOTE ADULT - CONVERSATION DETAILS
Spoke with patient's spouse, Felicia, via phone. Explained role of palliative medicine.    Prior to admission, patient residing at [facility]. Per wife, he has been doing well at AL, has mild dementia and needs assistance with some ADLs. We discussed advanced directives. Wife is surrogate decision maker. Patient has a prior MOLST with DNR/DNI; confirmed DNR/DNI with wife and new form filled out as prior form was missing witness signatures.     Provided our team's contact information. Explained wife to reach out with any further questions or support.

## 2023-10-17 NOTE — CONSULT NOTE ADULT - SUBJECTIVE AND OBJECTIVE BOX
HPI: Pt is a 88y old Male with hx of COPD, HLD, and dementia BIBEMS to the ED from [place] [facility] with complain of left-sided chest pain worse with inhalation, unknown time of onset. Patient is a limited historian due to Hx of dementia. In the ED patient is found to have pneumonia.     10/17: Patient seen and examined at bedside. Drowsy but arouses to voice. A&Ox2. Denies pain, SOB, in NAD.        PAIN: ( )Yes   (X)No  denies pain      DYSPNEA: ( ) Yes  (X) No  no dyspnea at rest  on 3LNC      PAST MEDICAL & SURGICAL HISTORY:  Scoliosis  Chronic cough  Osteoporosis  Occasional tremors  severe essential tremors  COPD, severe  HLD (hyperlipidemia)  Dementia  H/O partial resection of colon      SOCIAL HX:    Hx opiate tolerance ( )YES  ( )NO    Baseline ADLs  (Prior to Admission)  ( ) Independent   (X)Dependent      FAMILY HISTORY:  FHx: dementia (Mother)  FH: colon cancer (Father)  FH: Parkinson's disease (Sibling)  FH: stroke (Sibling)        Review of Systems:    Unable to obtain/Limited due to: A&Ox2, history of dementia      PHYSICAL EXAM:    Vital Signs Last 24 Hrs  T(C): 36.4 (17 Oct 2023 15:44), Max: 37.1 (17 Oct 2023 08:36)  T(F): 97.5 (17 Oct 2023 15:44), Max: 98.7 (17 Oct 2023 08:36)  HR: 57 (17 Oct 2023 15:44) (56 - 62)  BP: 124/50 (17 Oct 2023 15:44) (124/50 - 148/72)  BP(mean): --  RR: 18 (17 Oct 2023 15:44) (18 - 18)  SpO2: 98% (17 Oct 2023 15:44) (86% - 98%)    Parameters below as of 17 Oct 2023 15:44  Patient On (Oxygen Delivery Method): room air      Daily     Daily     PPSV2:  40%  FAST: 6C    General: Drowsy, arouses to voice. Pleasant, in NAD.  Mental Status: A&Ox2.  HEENT: Dry mucous membranes. On 3LNC.  Lungs: Diminished to bilateral anterior lung fields.  Cardiac: Regular rate and rhythm. S1S2.  GI: Abdomen soft, nontender, nondistended. +BSx4.   : No suprapubic tenderness.  Ext: TINSLEY x4. No edema.  Neuro: A&Ox2. Speech intact. No focal neuro deficits.       LABS:                        13.3   8.06  )-----------( 230      ( 16 Oct 2023 07:01 )             41.4     10-16    142  |  109<H>  |  36<H>  ----------------------------<  98  3.5   |  27  |  0.96    Ca    9.0      16 Oct 2023 07:01        Albumin: Albumin: 3.2 g/dL (10-11 @ 18:57)      Allergies  azithromycin (Rash)    Intolerances    lactose (Diarrhea)    MEDICATIONS  (STANDING):  donepezil 10 milliGRAM(s) Oral at bedtime  guaiFENesin  milliGRAM(s) Oral every 12 hours  heparin   Injectable 5000 Unit(s) SubCutaneous every 8 hours  latanoprost 0.005% Ophthalmic Solution 1 Drop(s) Both EYES at bedtime  piperacillin/tazobactam IVPB.. 3.375 Gram(s) IV Intermittent every 8 hours  predniSONE   Tablet 5 milliGRAM(s) Oral daily  primidone 100 milliGRAM(s) Oral two times a day  tamsulosin 0.4 milliGRAM(s) Oral at bedtime  timolol 0.5% Solution 1 Drop(s) Both EYES daily  tiotropium 2.5 MICROgram(s)/olodaterol 2.5 MICROgram(s) (STIOLTO) Inhaler 2 Puff(s) Inhalation daily  topiramate 50 milliGRAM(s) Oral two times a day    MEDICATIONS  (PRN):  acetaminophen     Tablet .. 650 milliGRAM(s) Oral every 6 hours PRN Temp greater or equal to 38C (100.4F), Mild Pain (1 - 3)  albuterol    90 MICROgram(s) HFA Inhaler 2 Puff(s) Inhalation every 4 hours PRN Shortness of Breath and/or Wheezing  aluminum hydroxide/magnesium hydroxide/simethicone Suspension 30 milliLiter(s) Oral every 4 hours PRN Dyspepsia  ondansetron Injectable 4 milliGRAM(s) IV Push every 8 hours PRN Nausea and/or Vomiting      RADIOLOGY/ADDITIONAL STUDIES:  < from: CT Abdomen and Pelvis No Cont (10.11.23 @ 19:58) >  ACC: [id number] EXAM:  CT ABDOMEN AND PELVIS   ORDERED BY: MOO SHAY     ACC: [id number] EXAM:  CT CHEST   ORDERED BY: MOO SHAY     PROCEDURE DATE:  10/11/2023          INTERPRETATION:  CLINICAL INFORMATION: Chest pain with questionable fall    COMPARISON: 8/22/2021    CONTRAST/COMPLICATIONS:  IV Contrast: NONE  Oral Contrast: NONE  Complications: None reported at time of study completion    PROCEDURE:  CT of the Chest, Abdomen and Pelvis was performed.  Sagittal and coronal reformats were performed.    FINDINGS:  CHEST:  LUNGS AND LARGE AIRWAYS: Patent central airways. No pulmonary nodules.   Emphysema. Infiltrate in the right lower lobe at the lung base posterior   medially  PLEURA: No pleural effusion.  VESSELS: Atherosclerotic changes of the aorta and coronary arteries.   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 No pericardial effusion.  MEDIASTINUM AND EMMA: No lymphadenopathy.  CHEST WALL AND LOWER NECK: Within normal limits.    ABDOMEN AND PELVIS:  LIVER: Multiple hepatic cysts  BILE DUCTS: Normal caliber.  GALLBLADDER: Within normal limits.  SPLEEN: Within normal limits.  PANCREAS: Within normal limits.  ADRENALS: Within normal limits.  KIDNEYS/URETERS: No hydronephrosis. Exophytic right upper pole cyst and   other small hypodensities too small to characterize. Small hyperdense   lesion at the lower pole of the right kidney too small to characterize.   Calcifications in the kidneys bilaterally are punctate and may be   vascular versus nonobstructive calculi    BLADDER: Mild diffuse bladder wall thickening likely due to outlet   obstruction. A small bladder calculus again seen  REPRODUCTIVE ORGANS: Fiducial markers in the prostate    BOWEL: No bowel obstruction. Appendix is not visualized. No evidence of   inflammation in the pericecal region There is a suture line in the   proximal sigmoid colon. There is diverticulosis without diverticulitis  PERITONEUM: No ascites.  VESSELS: Atherosclerotic changes.  RETROPERITONEUM/LYMPH NODES: No lymphadenopathy.  ABDOMINAL WALL: Within normal limits.  BONES: Degenerative changes.. ORIF of the left hip is seen with a single   screw. There is severe levoscoliosis of the upper lumbar spine    IMPRESSION:  Emphysema. Right lower lobe infiltrate  Thick-walled bladder with bladder calculus similar to the prior study  Hepatic and renal cysts  Diverticulosis without diverticulitis  No evidence of acute traumatic injury    --- End of Report ---            FREDDY FERRARI MD; Attending Radiologist  This document has been electronically signed. Oct 11 2023  8:18PM    < end of copied text >

## 2023-10-17 NOTE — PROGRESS NOTE ADULT - ASSESSMENT
A/P:    1.  Pneumonia  Clinically improving  Suspected gram negative rods  -pleuritic pain or pain due to cough-resolved  -Continue IV zosyn for 2 more days  -Dr Sultana follow up appreciated  -Aspiration precautions  - supportive care  -Hold lasix     2.  COPD-stable    3.  Code status: Patient is DNR/DNI,    Possible d/c on Thursday

## 2023-10-17 NOTE — PROGRESS NOTE ADULT - SUBJECTIVE AND OBJECTIVE BOX
HPI:  87 y/o Male with PMHx of COPD, HLD, and dementia BIBEMS to the ED from [facility] with complain of left-sided chest pain worse with inhalation, unknown time of onset.    10/14/23: Patient seen and examined. No new issues.   10/15/23: Awake and alert today. Discussed with wife and daughter at bed side regarding management and d/c plan.   10/16/23: No new issues.   10/17/23: Patient seen and examined. Awake and alert, eating lunch on his own. Wife at bed side. Discussed with her regarding management and d/c plan.     ROS- unable to get it as pt has dementia     Vital Signs Last 24 Hrs  T(C): 37.1 (17 Oct 2023 08:36), Max: 37.1 (17 Oct 2023 08:36)  T(F): 98.7 (17 Oct 2023 08:36), Max: 98.7 (17 Oct 2023 08:36)  HR: 62 (17 Oct 2023 08:36) (56 - 69)  BP: 148/72 (17 Oct 2023 08:36) (108/55 - 148/72)  BP(mean): --  RR: 18 (17 Oct 2023 08:36) (18 - 19)  SpO2: 96% (17 Oct 2023 08:36) (93% - 97%)    Parameters below as of 17 Oct 2023 08:36  Patient On (Oxygen Delivery Method): nasal cannula  O2 Flow (L/min): 3        GENERAL: comfortable   HEAD:  Atraumatic, Normocephalic  EYES: EOMI, PERRLA, conjunctiva and sclera clear  HEENT: Moist mucous membranes  NECK: Supple, No JVD  NERVOUS SYSTEM:  Alert, confused  CHEST/LUNG: AEEB, b/l crackles present   HEART:S1S2 normal, no murmer  ABDOMEN: Soft, Nontender, Nondistended; Bowel sounds present  GENITOURINARY- Voiding, no palpable bladder  EXTREMITIES:  2+ Peripheral Pulses, No clubbing, cyanosis, or edema  MUSCULOSKELETAL No muscle tenderness, Muscle tone normal, No joint tenderness, no Joint swelling, Joint range of motion-normal  SKIN-no rash, no lesion        LABS:                                         13.3   8.06  )-----------( 230      ( 16 Oct 2023 07:01 )             41.4     16 Oct 2023 07:01    142    |  109    |  36     ----------------------------<  98     3.5     |  27     |  0.96     Ca    9.0        16 Oct 2023 07:01          Standing medicine    acetaminophen     Tablet .. 650 milliGRAM(s) Oral every 6 hours PRN  albuterol    90 MICROgram(s) HFA Inhaler 2 Puff(s) Inhalation every 4 hours PRN  aluminum hydroxide/magnesium hydroxide/simethicone Suspension 30 milliLiter(s) Oral every 4 hours PRN  donepezil 10 milliGRAM(s) Oral at bedtime  furosemide    Tablet 40 milliGRAM(s) Oral daily  guaiFENesin  milliGRAM(s) Oral every 12 hours  heparin   Injectable 5000 Unit(s) SubCutaneous every 8 hours  latanoprost 0.005% Ophthalmic Solution 1 Drop(s) Both EYES at bedtime  melatonin 10 milliGRAM(s) Oral at bedtime  ondansetron Injectable 4 milliGRAM(s) IV Push every 8 hours PRN  piperacillin/tazobactam IVPB.. 3.375 Gram(s) IV Intermittent every 8 hours  predniSONE   Tablet 5 milliGRAM(s) Oral daily  primidone 100 milliGRAM(s) Oral two times a day  tamsulosin 0.4 milliGRAM(s) Oral at bedtime  timolol 0.5% Solution 1 Drop(s) Both EYES daily  tiotropium 2.5 MICROgram(s)/olodaterol 2.5 MICROgram(s) (STIOLTO) Inhaler 2 Puff(s) Inhalation daily  topiramate 50 milliGRAM(s) Oral two times a day

## 2023-10-18 ENCOUNTER — TRANSCRIPTION ENCOUNTER (OUTPATIENT)
Age: 88
End: 2023-10-18

## 2023-10-18 PROCEDURE — 99232 SBSQ HOSP IP/OBS MODERATE 35: CPT

## 2023-10-18 RX ADMIN — TIOTROPIUM BROMIDE AND OLODATEROL 2 PUFF(S): 3.124; 2.736 SPRAY, METERED RESPIRATORY (INHALATION) at 09:16

## 2023-10-18 RX ADMIN — Medication 600 MILLIGRAM(S): at 09:17

## 2023-10-18 RX ADMIN — PIPERACILLIN AND TAZOBACTAM 25 GRAM(S): 4; .5 INJECTION, POWDER, LYOPHILIZED, FOR SOLUTION INTRAVENOUS at 05:06

## 2023-10-18 RX ADMIN — Medication 50 MILLIGRAM(S): at 09:17

## 2023-10-18 RX ADMIN — DONEPEZIL HYDROCHLORIDE 10 MILLIGRAM(S): 10 TABLET, FILM COATED ORAL at 21:07

## 2023-10-18 RX ADMIN — TAMSULOSIN HYDROCHLORIDE 0.4 MILLIGRAM(S): 0.4 CAPSULE ORAL at 21:06

## 2023-10-18 RX ADMIN — LATANOPROST 1 DROP(S): 0.05 SOLUTION/ DROPS OPHTHALMIC; TOPICAL at 21:07

## 2023-10-18 RX ADMIN — PRIMIDONE 100 MILLIGRAM(S): 250 TABLET ORAL at 09:17

## 2023-10-18 RX ADMIN — HEPARIN SODIUM 5000 UNIT(S): 5000 INJECTION INTRAVENOUS; SUBCUTANEOUS at 21:06

## 2023-10-18 RX ADMIN — Medication 5 MILLIGRAM(S): at 09:17

## 2023-10-18 RX ADMIN — HEPARIN SODIUM 5000 UNIT(S): 5000 INJECTION INTRAVENOUS; SUBCUTANEOUS at 05:06

## 2023-10-18 RX ADMIN — PRIMIDONE 100 MILLIGRAM(S): 250 TABLET ORAL at 21:07

## 2023-10-18 RX ADMIN — Medication 600 MILLIGRAM(S): at 21:07

## 2023-10-18 RX ADMIN — PIPERACILLIN AND TAZOBACTAM 25 GRAM(S): 4; .5 INJECTION, POWDER, LYOPHILIZED, FOR SOLUTION INTRAVENOUS at 14:12

## 2023-10-18 RX ADMIN — Medication 50 MILLIGRAM(S): at 21:07

## 2023-10-18 RX ADMIN — PIPERACILLIN AND TAZOBACTAM 25 GRAM(S): 4; .5 INJECTION, POWDER, LYOPHILIZED, FOR SOLUTION INTRAVENOUS at 21:06

## 2023-10-18 RX ADMIN — Medication 1 DROP(S): at 09:18

## 2023-10-18 RX ADMIN — HEPARIN SODIUM 5000 UNIT(S): 5000 INJECTION INTRAVENOUS; SUBCUTANEOUS at 14:12

## 2023-10-18 NOTE — DISCHARGE NOTE NURSING/CASE MANAGEMENT/SOCIAL WORK - PATIENT PORTAL LINK FT
You can access the FollowMyHealth Patient Portal offered by [facility] by registering at the following website: [email or web address]. By joining [facility]’s FollowMyHealth portal, you will also be able to view your health information using other applications (apps) compatible with our system.

## 2023-10-18 NOTE — PROGRESS NOTE ADULT - ASSESSMENT
A/P:    1.  Pneumonia  Acute hypoxic resp failure due to pneumonia  Clinically improving  O2 support. Titrate off keep sats >92%  Suspected gram negative rods  -pleuritic pain or pain due to cough-resolved  -Continue IV zosyn for 1 more day  -Dr Sultana follow up appreciated  -Aspiration precautions  - supportive care  -Hold lasix     2.  COPD-stable    3.  Code status: Patient is DNR/DNI,    Disposition: Follow O2 sats on RA  Possible  back to [facility] in 1 or 2 days time

## 2023-10-18 NOTE — DISCHARGE NOTE NURSING/CASE MANAGEMENT/SOCIAL WORK - NSDCPEFALRISK_GEN_ALL_CORE
For information on Fall & Injury Prevention, visit: [email or web address] OR  [email or web address] OR  https://www.cdc.gov/steadi/patient.html

## 2023-10-18 NOTE — PROGRESS NOTE ADULT - SUBJECTIVE AND OBJECTIVE BOX
HPI:  89 y/o Male with PMHx of COPD, HLD, and dementia BIBEMS to the ED from [facility] with complain of left-sided chest pain worse with inhalation, unknown time of onset.    10/14/23: Patient seen and examined. No new issues.   10/15/23: Awake and alert today. Discussed with wife and daughter at bed side regarding management and d/c plan.   10/16/23: No new issues.   10/17/23: Patient seen and examined. Awake and alert, eating lunch on his own. Wife at bed side. Discussed with her regarding management and d/c plan.   10/18/23: Sitting in chair, no new issues. Discussed with wife at bed side regarding management and d/c plan.     ROS- unable to get it as pt has dementia       Vital Signs Last 24 Hrs  T(C): 36.2 (18 Oct 2023 07:34), Max: 36.9 (18 Oct 2023 00:07)  T(F): 97.2 (18 Oct 2023 07:34), Max: 98.5 (18 Oct 2023 00:07)  HR: 66 (18 Oct 2023 07:34) (57 - 66)  BP: 128/65 (18 Oct 2023 07:34) (124/50 - 132/54)  BP(mean): --  RR: 18 (18 Oct 2023 07:34) (18 - 18)  SpO2: 98% (18 Oct 2023 07:34) (93% - 98%)    Parameters below as of 18 Oct 2023 07:34  Patient On (Oxygen Delivery Method): nasal cannula        Physical exam:       GENERAL: comfortable   HEAD:  Atraumatic, Normocephalic  EYES: EOMI, PERRLA, conjunctiva and sclera clear  HEENT: Moist mucous membranes  NECK: Supple, No JVD  NERVOUS SYSTEM:  Alert, confused  CHEST/LUNG: AEEB, b/l crackles present   HEART:S1S2 normal, no murmer  ABDOMEN: Soft, Nontender, Nondistended; Bowel sounds present  GENITOURINARY- Voiding, no palpable bladder  EXTREMITIES:  2+ Peripheral Pulses, No clubbing, cyanosis, or edema  MUSCULOSKELETAL No muscle tenderness, Muscle tone normal, No joint tenderness, no Joint swelling, Joint range of motion-normal  SKIN-no rash, no lesion        LABS:                                         13.3   8.06  )-----------( 230      ( 16 Oct 2023 07:01 )             41.4     16 Oct 2023 07:01    142    |  109    |  36     ----------------------------<  98     3.5     |  27     |  0.96     Ca    9.0        16 Oct 2023 07:01          Standing medicine    acetaminophen     Tablet .. 650 milliGRAM(s) Oral every 6 hours PRN  albuterol    90 MICROgram(s) HFA Inhaler 2 Puff(s) Inhalation every 4 hours PRN  aluminum hydroxide/magnesium hydroxide/simethicone Suspension 30 milliLiter(s) Oral every 4 hours PRN  donepezil 10 milliGRAM(s) Oral at bedtime  furosemide    Tablet 40 milliGRAM(s) Oral daily  guaiFENesin  milliGRAM(s) Oral every 12 hours  heparin   Injectable 5000 Unit(s) SubCutaneous every 8 hours  latanoprost 0.005% Ophthalmic Solution 1 Drop(s) Both EYES at bedtime  melatonin 10 milliGRAM(s) Oral at bedtime  ondansetron Injectable 4 milliGRAM(s) IV Push every 8 hours PRN  piperacillin/tazobactam IVPB.. 3.375 Gram(s) IV Intermittent every 8 hours  predniSONE   Tablet 5 milliGRAM(s) Oral daily  primidone 100 milliGRAM(s) Oral two times a day  tamsulosin 0.4 milliGRAM(s) Oral at bedtime  timolol 0.5% Solution 1 Drop(s) Both EYES daily  tiotropium 2.5 MICROgram(s)/olodaterol 2.5 MICROgram(s) (STIOLTO) Inhaler 2 Puff(s) Inhalation daily  topiramate 50 milliGRAM(s) Oral two times a day

## 2023-10-19 PROCEDURE — 99232 SBSQ HOSP IP/OBS MODERATE 35: CPT

## 2023-10-19 PROCEDURE — 71045 X-RAY EXAM CHEST 1 VIEW: CPT | Mod: 26

## 2023-10-19 RX ORDER — APIXABAN 2.5 MG/1
5 TABLET, FILM COATED ORAL EVERY 12 HOURS
Refills: 0 | Status: DISCONTINUED | OUTPATIENT
Start: 2023-10-19 | End: 2023-10-20

## 2023-10-19 RX ORDER — CHOLECALCIFEROL (VITAMIN D3) 125 MCG
1000 CAPSULE ORAL DAILY
Refills: 0 | Status: DISCONTINUED | OUTPATIENT
Start: 2023-10-19 | End: 2023-10-20

## 2023-10-19 RX ADMIN — Medication 1 DROP(S): at 09:22

## 2023-10-19 RX ADMIN — TAMSULOSIN HYDROCHLORIDE 0.4 MILLIGRAM(S): 0.4 CAPSULE ORAL at 22:04

## 2023-10-19 RX ADMIN — Medication 50 MILLIGRAM(S): at 22:04

## 2023-10-19 RX ADMIN — LATANOPROST 1 DROP(S): 0.05 SOLUTION/ DROPS OPHTHALMIC; TOPICAL at 22:03

## 2023-10-19 RX ADMIN — APIXABAN 5 MILLIGRAM(S): 2.5 TABLET, FILM COATED ORAL at 22:03

## 2023-10-19 RX ADMIN — TIOTROPIUM BROMIDE AND OLODATEROL 2 PUFF(S): 3.124; 2.736 SPRAY, METERED RESPIRATORY (INHALATION) at 08:25

## 2023-10-19 RX ADMIN — Medication 5 MILLIGRAM(S): at 09:22

## 2023-10-19 RX ADMIN — Medication 600 MILLIGRAM(S): at 22:03

## 2023-10-19 RX ADMIN — PRIMIDONE 100 MILLIGRAM(S): 250 TABLET ORAL at 09:22

## 2023-10-19 RX ADMIN — PRIMIDONE 100 MILLIGRAM(S): 250 TABLET ORAL at 22:02

## 2023-10-19 RX ADMIN — Medication 600 MILLIGRAM(S): at 09:22

## 2023-10-19 RX ADMIN — HEPARIN SODIUM 5000 UNIT(S): 5000 INJECTION INTRAVENOUS; SUBCUTANEOUS at 05:00

## 2023-10-19 RX ADMIN — DONEPEZIL HYDROCHLORIDE 10 MILLIGRAM(S): 10 TABLET, FILM COATED ORAL at 22:03

## 2023-10-19 RX ADMIN — Medication 50 MILLIGRAM(S): at 09:22

## 2023-10-19 NOTE — PROGRESS NOTE ADULT - ASSESSMENT
A/P:    1.  Pneumonia, suspected aspiration  Acute hypoxic resp failure due to pneumonia  O2 support. Titrate off keep sats >92%  Suspected gram negative rods  -pleuritic pain or pain due to cough-resolved  -Continue IV zosyn   -Dr Sultana follow up appreciated  -Aspiration precautions  - supportive care  -Repeat CXR  -Pulmonary eval  -Hold lasix     2.  COPD-stable    3.  Code status: Patient is DNR/DNI,    Disposition: Follow O2 sats on RA  Pulmonary eval

## 2023-10-19 NOTE — PROGRESS NOTE ADULT - SUBJECTIVE AND OBJECTIVE BOX
HPI:  87 y/o Male with PMHx of COPD, HLD, and dementia BIBEMS to the ED from [facility] with complain of left-sided chest pain worse with inhalation, unknown time of onset.    10/14/23: Patient seen and examined. No new issues.   10/15/23: Awake and alert today. Discussed with wife and daughter at bed side regarding management and d/c plan.   10/16/23: No new issues.   10/17/23: Patient seen and examined. Awake and alert, eating lunch on his own. Wife at bed side. Discussed with her regarding management and d/c plan.   10/18/23: Sitting in chair, no new issues. Discussed with wife at bed side regarding management and d/c plan.   10/19/23: Sleepy, still mildly hypoxic O2 sats on RA on rest 89-90%, congested.     ROS- unable to get it as pt has dementia       Vital Signs Last 24 Hrs  T(C): 36.3 (19 Oct 2023 07:51), Max: 36.7 (18 Oct 2023 22:43)  T(F): 97.4 (19 Oct 2023 07:51), Max: 98.1 (18 Oct 2023 22:43)  HR: 80 (19 Oct 2023 08:28) (58 - 80)  BP: 123/60 (19 Oct 2023 07:51) (121/64 - 124/54)  BP(mean): --  RR: 18 (19 Oct 2023 10:32) (18 - 18)  SpO2: 90% (19 Oct 2023 10:32) (90% - 98%)    Parameters below as of 19 Oct 2023 10:32  Patient On (Oxygen Delivery Method): room air          Physical exam:       GENERAL: comfortable   HEAD:  Atraumatic, Normocephalic  EYES: EOMI, PERRLA, conjunctiva and sclera clear  HEENT: Moist mucous membranes  NECK: Supple, No JVD  NERVOUS SYSTEM:  Alert, confused  CHEST/LUNG: AEEB, b/l crackles present   HEART:S1S2 normal, no murmer  ABDOMEN: Soft, Nontender, Nondistended; Bowel sounds present  GENITOURINARY- Voiding, no palpable bladder  EXTREMITIES:  2+ Peripheral Pulses, No clubbing, cyanosis, or edema  MUSCULOSKELETAL No muscle tenderness, Muscle tone normal, No joint tenderness, no Joint swelling, Joint range of motion-normal  SKIN-no rash, no lesion.        LABS:                   MEDICATIONS  (STANDING):  donepezil 10 milliGRAM(s) Oral at bedtime  guaiFENesin  milliGRAM(s) Oral every 12 hours  heparin   Injectable 5000 Unit(s) SubCutaneous every 8 hours  latanoprost 0.005% Ophthalmic Solution 1 Drop(s) Both EYES at bedtime  predniSONE   Tablet 5 milliGRAM(s) Oral daily  primidone 100 milliGRAM(s) Oral two times a day  tamsulosin 0.4 milliGRAM(s) Oral at bedtime  timolol 0.5% Solution 1 Drop(s) Both EYES daily  tiotropium 2.5 MICROgram(s)/olodaterol 2.5 MICROgram(s) (STIOLTO) Inhaler 2 Puff(s) Inhalation daily  topiramate 50 milliGRAM(s) Oral two times a day    MEDICATIONS  (PRN):  acetaminophen     Tablet .. 650 milliGRAM(s) Oral every 6 hours PRN Temp greater or equal to 38C (100.4F), Mild Pain (1 - 3)  albuterol    90 MICROgram(s) HFA Inhaler 2 Puff(s) Inhalation every 4 hours PRN Shortness of Breath and/or Wheezing  aluminum hydroxide/magnesium hydroxide/simethicone Suspension 30 milliLiter(s) Oral every 4 hours PRN Dyspepsia  ondansetron Injectable 4 milliGRAM(s) IV Push every 8 hours PRN Nausea and/or Vomiting

## 2023-10-19 NOTE — CONSULT NOTE ADULT - SUBJECTIVE AND OBJECTIVE BOX
HPI:    89 y/o Male with PMHx of COPD, HLD, and dementia admitted from [facility] with complain of left-sided chest pain worse with inhalation, unknown time of onset. Patient is a limited historian due to Hx of dementia. In the ED patient is found to have pneumonia. pat admitted seen for pulmonary eval.    PMHx:  Unable to get detail family hx as patient has dementia.  (12 Oct 2023 05:42)      PAST MEDICAL & SURGICAL HISTORY:  Scoliosis      Chronic cough      Osteoporosis      Occasional tremors  severe essential tremors      COPD, severe      HLD (hyperlipidemia)      Dementia      H/O partial resection of colon          Home Medications:  donepezil 10 mg oral tablet: 1 tab(s) orally once a day (at bedtime) (11 Oct 2023 18:51)  furosemide 40 mg oral tablet: 1 tab(s) orally once a day (11 Oct 2023 18:51)  guaiFENesin 600 mg oral tablet, extended release: 1 tab(s) orally once a day (in the evening) (11 Oct 2023 18:51)  Melatonin 10 mg oral capsule: 1 cap(s) orally once a day (at bedtime) (11 Oct 2023 18:51)  methenamine hippurate 1 g oral tablet: 1 tab(s) orally once a day (11 Oct 2023 18:51)  pravastatin 40 mg oral tablet: 1 tab(s) orally once a day (at bedtime) (11 Oct 2023 18:51)  predniSONE 5 mg oral tablet: 1 tab(s) orally once a day (11 Oct 2023 23:16)  primidone 50 mg oral tablet: 2 tab(s) orally 2 times a day (11 Oct 2023 18:51)  Stiolto Respimat 60 ACT 2.5 mcg-2.5 mcg/inh inhalation aerosol: 1 puff(s) inhaled once a day (11 Oct 2023 18:51)  tamsulosin 0.4 mg oral capsule: 1 cap(s) orally once a day (at bedtime) (11 Oct 2023 18:51)  Timoptic 0.5% ophthalmic solution: 1 drop(s) in each eye once a day (11 Oct 2023 18:51)  topiramate 50 mg oral tablet: 1 tab(s) orally 2 times a day (11 Oct 2023 18:51)  triamcinolone 0.1% topical cream: Apply topically to affected area once a day as needed for  itching (11 Oct 2023 23:16)  Tylenol 325 mg oral tablet: 2 tab(s) orally every 6 hours as needed for pain (11 Oct 2023 23:16)  Vitamin B-12: 1 tab(s) orally once a day (11 Oct 2023 18:51)  Xalatan 0.005% ophthalmic solution: 1 drop(s) in each eye once a day (at bedtime) (11 Oct 2023 18:50)      MEDICATIONS  (STANDING):  apixaban 5 milliGRAM(s) Oral every 12 hours  cholecalciferol 1000 Unit(s) Oral daily  donepezil 10 milliGRAM(s) Oral at bedtime  guaiFENesin  milliGRAM(s) Oral every 12 hours  latanoprost 0.005% Ophthalmic Solution 1 Drop(s) Both EYES at bedtime  predniSONE   Tablet 5 milliGRAM(s) Oral daily  primidone 100 milliGRAM(s) Oral two times a day  tamsulosin 0.4 milliGRAM(s) Oral at bedtime  timolol 0.5% Solution 1 Drop(s) Both EYES daily  tiotropium 2.5 MICROgram(s)/olodaterol 2.5 MICROgram(s) (STIOLTO) Inhaler 2 Puff(s) Inhalation daily  topiramate 50 milliGRAM(s) Oral two times a day    MEDICATIONS  (PRN):  acetaminophen     Tablet .. 650 milliGRAM(s) Oral every 6 hours PRN Temp greater or equal to 38C (100.4F), Mild Pain (1 - 3)  albuterol    90 MICROgram(s) HFA Inhaler 2 Puff(s) Inhalation every 4 hours PRN Shortness of Breath and/or Wheezing  aluminum hydroxide/magnesium hydroxide/simethicone Suspension 30 milliLiter(s) Oral every 4 hours PRN Dyspepsia  ondansetron Injectable 4 milliGRAM(s) IV Push every 8 hours PRN Nausea and/or Vomiting      Allergies    azithromycin (Rash)    Intolerances    lactose (Diarrhea)      SOCIAL HISTORY: Denies tobacco, etoh abuse or illicit drug use    FAMILY HISTORY:  FHx: dementia (Mother)    FH: colon cancer (Father)    FH: Parkinson's disease (Sibling)    FH: stroke (Sibling)        Vital Signs Last 24 Hrs  T(C): 36.8 (19 Oct 2023 15:29), Max: 36.8 (19 Oct 2023 15:29)  T(F): 98.2 (19 Oct 2023 15:29), Max: 98.2 (19 Oct 2023 15:29)  HR: 76 (19 Oct 2023 15:29) (66 - 80)  BP: 118/63 (19 Oct 2023 15:29) (118/63 - 123/60)  BP(mean): --  RR: 19 (19 Oct 2023 15:29) (18 - 19)  SpO2: 95% (19 Oct 2023 15:29) (90% - 98%)    Parameters below as of 19 Oct 2023 15:29  Patient On (Oxygen Delivery Method): room air            REVIEW OF SYSTEMS:    CONSTITUTIONAL:  As per HPI.  HEENT:  Eyes:  No diplopia or blurred vision. ENT:  No earache, sore throat or runny nose.  CARDIOVASCULAR:  No pressure, squeezing, tightness, heaviness or aching about the chest, neck, axilla or epigastrium.  RESPIRATORY:  No cough, shortness of breath, PND or orthopnea.  GASTROINTESTINAL:  No nausea, vomiting or diarrhea.  GENITOURINARY:  No dysuria, frequency or urgency.  MUSCULOSKELETAL:  As per HPI.  SKIN:  No change in skin, hair or nails.  NEUROLOGIC:  No paresthesias, fasciculations, seizures or weakness.  PSYCHIATRIC:  No disorder of thought or mood.  ENDOCRINE:  No heat or cold intolerance, polyuria or polydipsia.  HEMATOLOGICAL:  No easy bruising or bleedings:  .     PHYSICAL EXAMINATION:    GENERAL APPEARANCE:  Pt. is not currently dyspneic, in no distress. Pt. is alert, oriented, and pleasant.  HEENT:  Pupils are normal and react normally. No icterus. Mucous membranes well colored.  NECK:  Supple. No lymphadenopathy. Jugular venous pressure not elevated. Carotids equal.   HEART:   The cardiac impulse has a normal quality. Regular. Normal S1 and S2. There are no murmurs, rubs or gallops noted  CHEST:  Chest is clear to auscultation. Normal respiratory effort.  ABDOMEN:  Soft and nontender.   EXTREMITIES:  There is no cyanosis, clubbing or edema.   SKIN:  No rash or significant lesions are noted.    LABS:                          RADIOLOGY & ADDITIONAL STUDIES:       CT Chest No Cont (10.11.23 @ 19:58) >  IMPRESSION:  Emphysema. Right lower lobe infiltrate  Thick-walled bladder with bladder calculus similar to the prior study  Hepatic and renal cysts  Diverticulosis without diverticulitis  No evidence of acute traumatic injury

## 2023-10-19 NOTE — CONSULT NOTE ADULT - ASSESSMENT
PROBLEMS:    Pneumonia, suspected aspiration  Acute hypoxic resp failure due to pneumonia  COPD-stable    PLAN:    FiO2 support. Titrate off keep sats >92%  IV zosyn   Aspiration precautions  Supportive care  Repeat CXR  AEROSOLS  DVT PROPHYLASIX  Code status: Patient is DNR/DNI

## 2023-10-20 LAB
ANION GAP SERPL CALC-SCNC: 9 MMOL/L — SIGNIFICANT CHANGE UP (ref 5–17)
ANION GAP SERPL CALC-SCNC: 9 MMOL/L — SIGNIFICANT CHANGE UP (ref 5–17)
BUN SERPL-MCNC: 29 MG/DL — HIGH (ref 7–23)
BUN SERPL-MCNC: 29 MG/DL — HIGH (ref 7–23)
CALCIUM SERPL-MCNC: 9.1 MG/DL — SIGNIFICANT CHANGE UP (ref 8.5–10.1)
CALCIUM SERPL-MCNC: 9.1 MG/DL — SIGNIFICANT CHANGE UP (ref 8.5–10.1)
CHLORIDE SERPL-SCNC: 109 MMOL/L — HIGH (ref 96–108)
CHLORIDE SERPL-SCNC: 109 MMOL/L — HIGH (ref 96–108)
CO2 SERPL-SCNC: 24 MMOL/L — SIGNIFICANT CHANGE UP (ref 22–31)
CO2 SERPL-SCNC: 24 MMOL/L — SIGNIFICANT CHANGE UP (ref 22–31)
CREAT SERPL-MCNC: 0.9 MG/DL — SIGNIFICANT CHANGE UP (ref 0.5–1.3)
CREAT SERPL-MCNC: 0.9 MG/DL — SIGNIFICANT CHANGE UP (ref 0.5–1.3)
EGFR: 82 ML/MIN/1.73M2 — SIGNIFICANT CHANGE UP
EGFR: 82 ML/MIN/1.73M2 — SIGNIFICANT CHANGE UP
GLUCOSE SERPL-MCNC: 89 MG/DL — SIGNIFICANT CHANGE UP (ref 70–99)
GLUCOSE SERPL-MCNC: 89 MG/DL — SIGNIFICANT CHANGE UP (ref 70–99)
HCT VFR BLD CALC: 43.3 % — SIGNIFICANT CHANGE UP (ref 39–50)
HCT VFR BLD CALC: 43.3 % — SIGNIFICANT CHANGE UP (ref 39–50)
HGB BLD-MCNC: 14 G/DL — SIGNIFICANT CHANGE UP (ref 13–17)
HGB BLD-MCNC: 14 G/DL — SIGNIFICANT CHANGE UP (ref 13–17)
MCHC RBC-ENTMCNC: 30.4 PG — SIGNIFICANT CHANGE UP (ref 27–34)
MCHC RBC-ENTMCNC: 30.4 PG — SIGNIFICANT CHANGE UP (ref 27–34)
MCHC RBC-ENTMCNC: 32.3 GM/DL — SIGNIFICANT CHANGE UP (ref 32–36)
MCHC RBC-ENTMCNC: 32.3 GM/DL — SIGNIFICANT CHANGE UP (ref 32–36)
MCV RBC AUTO: 93.9 FL — SIGNIFICANT CHANGE UP (ref 80–100)
MCV RBC AUTO: 93.9 FL — SIGNIFICANT CHANGE UP (ref 80–100)
PLATELET # BLD AUTO: 275 K/UL — SIGNIFICANT CHANGE UP (ref 150–400)
PLATELET # BLD AUTO: 275 K/UL — SIGNIFICANT CHANGE UP (ref 150–400)
POTASSIUM SERPL-MCNC: 4.2 MMOL/L — SIGNIFICANT CHANGE UP (ref 3.5–5.3)
POTASSIUM SERPL-MCNC: 4.2 MMOL/L — SIGNIFICANT CHANGE UP (ref 3.5–5.3)
POTASSIUM SERPL-SCNC: 4.2 MMOL/L — SIGNIFICANT CHANGE UP (ref 3.5–5.3)
POTASSIUM SERPL-SCNC: 4.2 MMOL/L — SIGNIFICANT CHANGE UP (ref 3.5–5.3)
RBC # BLD: 4.61 M/UL — SIGNIFICANT CHANGE UP (ref 4.2–5.8)
RBC # BLD: 4.61 M/UL — SIGNIFICANT CHANGE UP (ref 4.2–5.8)
RBC # FLD: 13.2 % — SIGNIFICANT CHANGE UP (ref 10.3–14.5)
RBC # FLD: 13.2 % — SIGNIFICANT CHANGE UP (ref 10.3–14.5)
SODIUM SERPL-SCNC: 142 MMOL/L — SIGNIFICANT CHANGE UP (ref 135–145)
SODIUM SERPL-SCNC: 142 MMOL/L — SIGNIFICANT CHANGE UP (ref 135–145)
WBC # BLD: 12.69 K/UL — HIGH (ref 3.8–10.5)
WBC # BLD: 12.69 K/UL — HIGH (ref 3.8–10.5)
WBC # FLD AUTO: 12.69 K/UL — HIGH (ref 3.8–10.5)
WBC # FLD AUTO: 12.69 K/UL — HIGH (ref 3.8–10.5)

## 2023-10-20 PROCEDURE — 99233 SBSQ HOSP IP/OBS HIGH 50: CPT

## 2023-10-20 RX ORDER — ENOXAPARIN SODIUM 100 MG/ML
40 INJECTION SUBCUTANEOUS EVERY 24 HOURS
Refills: 0 | Status: DISCONTINUED | OUTPATIENT
Start: 2023-10-20 | End: 2023-10-24

## 2023-10-20 RX ADMIN — PRIMIDONE 100 MILLIGRAM(S): 250 TABLET ORAL at 22:48

## 2023-10-20 RX ADMIN — Medication 600 MILLIGRAM(S): at 09:33

## 2023-10-20 RX ADMIN — ENOXAPARIN SODIUM 40 MILLIGRAM(S): 100 INJECTION SUBCUTANEOUS at 14:48

## 2023-10-20 RX ADMIN — Medication 1 TABLET(S): at 14:47

## 2023-10-20 RX ADMIN — TAMSULOSIN HYDROCHLORIDE 0.4 MILLIGRAM(S): 0.4 CAPSULE ORAL at 22:49

## 2023-10-20 RX ADMIN — Medication 1 TABLET(S): at 22:49

## 2023-10-20 RX ADMIN — LATANOPROST 1 DROP(S): 0.05 SOLUTION/ DROPS OPHTHALMIC; TOPICAL at 22:54

## 2023-10-20 RX ADMIN — Medication 50 MILLIGRAM(S): at 22:49

## 2023-10-20 RX ADMIN — Medication 5 MILLIGRAM(S): at 09:33

## 2023-10-20 RX ADMIN — Medication 600 MILLIGRAM(S): at 22:49

## 2023-10-20 RX ADMIN — DONEPEZIL HYDROCHLORIDE 10 MILLIGRAM(S): 10 TABLET, FILM COATED ORAL at 22:49

## 2023-10-20 RX ADMIN — TIOTROPIUM BROMIDE AND OLODATEROL 2 PUFF(S): 3.124; 2.736 SPRAY, METERED RESPIRATORY (INHALATION) at 08:46

## 2023-10-20 RX ADMIN — Medication 50 MILLIGRAM(S): at 09:33

## 2023-10-20 RX ADMIN — Medication 1 DROP(S): at 09:33

## 2023-10-20 RX ADMIN — PRIMIDONE 100 MILLIGRAM(S): 250 TABLET ORAL at 09:32

## 2023-10-20 NOTE — PROGRESS NOTE ADULT - SUBJECTIVE AND OBJECTIVE BOX
Subjective:    pat lying in bed, sleepy, wbc 12, no respiratory distress.    Home Medications:  donepezil 10 mg oral tablet: 1 tab(s) orally once a day (at bedtime) (11 Oct 2023 18:51)  furosemide 40 mg oral tablet: 1 tab(s) orally once a day (11 Oct 2023 18:51)  guaiFENesin 600 mg oral tablet, extended release: 1 tab(s) orally once a day (in the evening) (11 Oct 2023 18:51)  Melatonin 10 mg oral capsule: 1 cap(s) orally once a day (at bedtime) (11 Oct 2023 18:51)  methenamine hippurate 1 g oral tablet: 1 tab(s) orally once a day (11 Oct 2023 18:51)  pravastatin 40 mg oral tablet: 1 tab(s) orally once a day (at bedtime) (11 Oct 2023 18:51)  predniSONE 5 mg oral tablet: 1 tab(s) orally once a day (11 Oct 2023 23:16)  primidone 50 mg oral tablet: 2 tab(s) orally 2 times a day (11 Oct 2023 18:51)  Stiolto Respimat 60 ACT 2.5 mcg-2.5 mcg/inh inhalation aerosol: 1 puff(s) inhaled once a day (11 Oct 2023 18:51)  tamsulosin 0.4 mg oral capsule: 1 cap(s) orally once a day (at bedtime) (11 Oct 2023 18:51)  Timoptic 0.5% ophthalmic solution: 1 drop(s) in each eye once a day (11 Oct 2023 18:51)  topiramate 50 mg oral tablet: 1 tab(s) orally 2 times a day (11 Oct 2023 18:51)  triamcinolone 0.1% topical cream: Apply topically to affected area once a day as needed for  itching (11 Oct 2023 23:16)  Tylenol 325 mg oral tablet: 2 tab(s) orally every 6 hours as needed for pain (11 Oct 2023 23:16)  Vitamin B-12: 1 tab(s) orally once a day (11 Oct 2023 18:51)  Xalatan 0.005% ophthalmic solution: 1 drop(s) in each eye once a day (at bedtime) (11 Oct 2023 18:50)    MEDICATIONS  (STANDING):  amoxicillin  500 milliGRAM(s)/clavulanate 1 Tablet(s) Oral two times a day  donepezil 10 milliGRAM(s) Oral at bedtime  enoxaparin Injectable 40 milliGRAM(s) SubCutaneous every 24 hours  guaiFENesin  milliGRAM(s) Oral every 12 hours  latanoprost 0.005% Ophthalmic Solution 1 Drop(s) Both EYES at bedtime  predniSONE   Tablet 5 milliGRAM(s) Oral daily  primidone 100 milliGRAM(s) Oral two times a day  tamsulosin 0.4 milliGRAM(s) Oral at bedtime  timolol 0.5% Solution 1 Drop(s) Both EYES daily  tiotropium 2.5 MICROgram(s)/olodaterol 2.5 MICROgram(s) (STIOLTO) Inhaler 2 Puff(s) Inhalation daily  topiramate 50 milliGRAM(s) Oral two times a day    MEDICATIONS  (PRN):  acetaminophen     Tablet .. 650 milliGRAM(s) Oral every 6 hours PRN Temp greater or equal to 38C (100.4F), Mild Pain (1 - 3)  albuterol    90 MICROgram(s) HFA Inhaler 2 Puff(s) Inhalation every 4 hours PRN Shortness of Breath and/or Wheezing  aluminum hydroxide/magnesium hydroxide/simethicone Suspension 30 milliLiter(s) Oral every 4 hours PRN Dyspepsia  ondansetron Injectable 4 milliGRAM(s) IV Push every 8 hours PRN Nausea and/or Vomiting      Allergies    azithromycin (Rash)    Intolerances    lactose (Diarrhea)      Vital Signs Last 24 Hrs  T(C): 37.1 (20 Oct 2023 08:06), Max: 37.2 (19 Oct 2023 20:34)  T(F): 98.8 (20 Oct 2023 08:06), Max: 99 (19 Oct 2023 20:34)  HR: 70 (20 Oct 2023 08:06) (70 - 76)  BP: 138/49 (20 Oct 2023 08:06) (118/60 - 138/49)  BP(mean): --  RR: 19 (20 Oct 2023 08:06) (19 - 19)  SpO2: 98% (20 Oct 2023 08:06) (94% - 98%)    Parameters below as of 20 Oct 2023 08:06  Patient On (Oxygen Delivery Method): nasal cannula          PHYSICAL EXAMINATION:    NECK:  Supple. No lymphadenopathy. Jugular venous pressure not elevated. Carotids equal.   HEART:   The cardiac impulse has a normal quality. Reg., Nl S1 and S2.  There are no murmurs, rubs or gallops noted  CHEST:  Chest is clear to auscultation. Normal respiratory effort.  ABDOMEN:  Soft and nontender.   EXTREMITIES:  There is no edema.       LABS:                        14.0   12.69 )-----------( 275      ( 20 Oct 2023 07:28 )             43.3     10-20    142  |  109<H>  |  29<H>  ----------------------------<  89  4.2   |  24  |  0.90    Ca    9.1      20 Oct 2023 07:28        Urinalysis Basic - ( 20 Oct 2023 07:28 )    Color: x / Appearance: x / SG: x / pH: x  Gluc: 89 mg/dL / Ketone: x  / Bili: x / Urobili: x   Blood: x / Protein: x / Nitrite: x   Leuk Esterase: x / RBC: x / WBC x   Sq Epi: x / Non Sq Epi: x / Bacteria: x

## 2023-10-20 NOTE — PROGRESS NOTE ADULT - ASSESSMENT
PROBLEMS:    Pneumonia, suspected aspiration  Acute hypoxic resp failure due to pneumonia  COPD-stable    PLAN:    FiO2 support. Titrate off keep sats >92%  Po augmentin  Aspiration precautions  Supportive care  Repeat CXR  AEROSOLS  DVT PROPHYLASIX  Code status: Patient is DNR/DNI

## 2023-10-20 NOTE — PROGRESS NOTE ADULT - SUBJECTIVE AND OBJECTIVE BOX
HPI:  87 y/o Male with PMHx of COPD, HLD, and dementia BIBEMS to the ED from [facility] with complain of left-sided chest pain worse with inhalation, unknown time of onset.    10/14/23: Patient seen and examined. No new issues.   10/15/23: Awake and alert today. Discussed with wife and daughter at bed side regarding management and d/c plan.   10/16/23: No new issues.   10/17/23: Patient seen and examined. Awake and alert, eating lunch on his own. Wife at bed side. Discussed with her regarding management and d/c plan.   10/18/23: Sitting in chair, no new issues. Discussed with wife at bed side regarding management and d/c plan.   10/19/23: Sleepy, still mildly hypoxic O2 sats on RA on rest 89-90%, congested.   10/20/23: pt sitting upright in bed, awake and alert. wife at bedside- agreeable to plan of care. reports pt seems improved today vs. yesterday, mentating better and more alert. on 2Lo2 via NC in no acute distress.     ROS- unable to get it as pt has dementia, states he feels "ok"    Vital Signs Last 24 Hrs  T(C): 37.1 (20 Oct 2023 08:06), Max: 37.2 (19 Oct 2023 20:34)  T(F): 98.8 (20 Oct 2023 08:06), Max: 99 (19 Oct 2023 20:34)  HR: 70 (20 Oct 2023 08:06) (70 - 76)  BP: 138/49 (20 Oct 2023 08:06) (118/60 - 138/49)  BP(mean): --  RR: 19 (20 Oct 2023 08:06) (19 - 19)  SpO2: 98% (20 Oct 2023 08:06) (94% - 98%)    Parameters below as of 20 Oct 2023 08:06  Patient On (Oxygen Delivery Method): nasal cannula      Physical exam:     GENERAL: comfortable appearing, awake and alert, answering questions with 1-word replies. A&OX1-2, forgetful.   HEAD:  Atraumatic, Normocephalic  EYES: EOMI, PERRLA, conjunctiva and sclera clear  HEENT: Moist mucous membranes  NECK: Supple, No JVD  NERVOUS SYSTEM:  Alert, confused  CHEST/LUNG: AEEB, b/l crackles present   HEART: S1S2 normal, no murmur   ABDOMEN: Soft, Nontender, Nondistended; Bowel sounds present  GENITOURINARY: Voiding, no palpable bladder  EXTREMITIES:  2+ Peripheral Pulses, No clubbing, cyanosis, or edema  MUSCULOSKELETAL No muscle tenderness, Muscle tone normal, No joint tenderness, no Joint swelling, Joint range of motion-normal  SKIN-no rash, no lesion.        LABS:                        14.0   12.69 )-----------( 275      ( 20 Oct 2023 07:28 )             43.3   10-20    142  |  109<H>  |  29<H>  ----------------------------<  89  4.2   |  24  |  0.90    Ca    9.1      20 Oct 2023 07:28               MEDICATIONS  (STANDING):  donepezil 10 milliGRAM(s) Oral at bedtime  guaiFENesin  milliGRAM(s) Oral every 12 hours  heparin   Injectable 5000 Unit(s) SubCutaneous every 8 hours  latanoprost 0.005% Ophthalmic Solution 1 Drop(s) Both EYES at bedtime  predniSONE   Tablet 5 milliGRAM(s) Oral daily  primidone 100 milliGRAM(s) Oral two times a day  tamsulosin 0.4 milliGRAM(s) Oral at bedtime  timolol 0.5% Solution 1 Drop(s) Both EYES daily  tiotropium 2.5 MICROgram(s)/olodaterol 2.5 MICROgram(s) (STIOLTO) Inhaler 2 Puff(s) Inhalation daily  topiramate 50 milliGRAM(s) Oral two times a day    MEDICATIONS  (PRN):  acetaminophen     Tablet .. 650 milliGRAM(s) Oral every 6 hours PRN Temp greater or equal to 38C (100.4F), Mild Pain (1 - 3)  albuterol    90 MICROgram(s) HFA Inhaler 2 Puff(s) Inhalation every 4 hours PRN Shortness of Breath and/or Wheezing  aluminum hydroxide/magnesium hydroxide/simethicone Suspension 30 milliLiter(s) Oral every 4 hours PRN Dyspepsia  ondansetron Injectable 4 milliGRAM(s) IV Push every 8 hours PRN Nausea and/or Vomiting

## 2023-10-20 NOTE — PROGRESS NOTE ADULT - ATTENDING COMMENTS
Metabolic encephalopathy due to acute Hypoxic Respiratory Failure due to aspiration pneumonia:  - was on zosyn, switched to augmentin   - mentation slowly improving.   - hypoxia improving - wean when appropriate.       dc to [facility] when medically stable and hypoxia resolves

## 2023-10-20 NOTE — PROGRESS NOTE ADULT - ASSESSMENT
This is an 88-year-old M with PMHx of COPD, HLD, and dementia BIBEMS to the ED from [place] [facility] with complain of left-sided chest pain worse with inhalation, unknown time of onset. Admitted for acute hypoxic respiratory failure 2/2 PNA, suspected aspiration     # Acute Hypoxic Respiratory Failure  # PNA, suspected aspiration  - supplemental O2 support, maintain sats > 92%   - suspected gram neg rods   - s/p Zosyn #7 days, start Augmentin   - pulm consult appreciated  - rpt CXR in AM   - trend CBC (WBC increasing) in AM   - c/w prednisone and inhalers   - continue to hold lasix     # COPD   - stable, continue to monitor     # DVT ppx  - enoxaparin     Code Status: pt is DNR/DNI     Dispo: f/u AM CBC, CXR. add Augmentin x 3 days for completion of 10-day abx course. Supportive care.          This is an 88-year-old M with PMHx of COPD, HLD, and dementia BIBEMS to the ED from [place] [facility] with complain of left-sided chest pain worse with inhalation, unknown time of onset. Admitted for acute hypoxic respiratory failure 2/2 PNA, suspected aspiration     # Metabolic encephalopathy due to acute Hypoxic Respiratory Failure due to aspiration pneumonia:  -CT: RLL PNA  - supplemental O2 support, maintain sats > 92%   - suspected gram neg rods   - s/p Zosyn #7 days, start Augmentin to complete 10 days total.   - pulm consult appreciated  - rpt CXR 10/19: Patchy and streaky bibasilar parenchymal opacities are reidentified may represent fibroatelectatic changes.  component of pneumonia difficult to exclude radiographically.  - mild leukocytosis, follow up repeat in AM.  Pt on prednisone which could effect this.   - c/w prednisone and inhalers   - pulmonary following   - continue to hold lasix, AM BNP, will re-eval if needs to be restarted.     # COPD/Emphysema:   - stable, continue to monitor     # Essential tremor:  -c/w home meds    # DVT ppx  - enoxaparin     Code Status: pt is DNR/DNI     Dispo: f/u AM CBC,  Augmentin x 3 days for completion of 10-day abx course. Supportive care.

## 2023-10-21 LAB
ANION GAP SERPL CALC-SCNC: 7 MMOL/L — SIGNIFICANT CHANGE UP (ref 5–17)
ANION GAP SERPL CALC-SCNC: 7 MMOL/L — SIGNIFICANT CHANGE UP (ref 5–17)
BUN SERPL-MCNC: 32 MG/DL — HIGH (ref 7–23)
BUN SERPL-MCNC: 32 MG/DL — HIGH (ref 7–23)
CALCIUM SERPL-MCNC: 8.9 MG/DL — SIGNIFICANT CHANGE UP (ref 8.5–10.1)
CALCIUM SERPL-MCNC: 8.9 MG/DL — SIGNIFICANT CHANGE UP (ref 8.5–10.1)
CHLORIDE SERPL-SCNC: 110 MMOL/L — HIGH (ref 96–108)
CHLORIDE SERPL-SCNC: 110 MMOL/L — HIGH (ref 96–108)
CO2 SERPL-SCNC: 23 MMOL/L — SIGNIFICANT CHANGE UP (ref 22–31)
CO2 SERPL-SCNC: 23 MMOL/L — SIGNIFICANT CHANGE UP (ref 22–31)
CREAT SERPL-MCNC: 0.84 MG/DL — SIGNIFICANT CHANGE UP (ref 0.5–1.3)
CREAT SERPL-MCNC: 0.84 MG/DL — SIGNIFICANT CHANGE UP (ref 0.5–1.3)
EGFR: 84 ML/MIN/1.73M2 — SIGNIFICANT CHANGE UP
EGFR: 84 ML/MIN/1.73M2 — SIGNIFICANT CHANGE UP
GLUCOSE BLDC GLUCOMTR-MCNC: 115 MG/DL — HIGH (ref 70–99)
GLUCOSE BLDC GLUCOMTR-MCNC: 115 MG/DL — HIGH (ref 70–99)
GLUCOSE SERPL-MCNC: 115 MG/DL — HIGH (ref 70–99)
GLUCOSE SERPL-MCNC: 115 MG/DL — HIGH (ref 70–99)
HCT VFR BLD CALC: 46.1 % — SIGNIFICANT CHANGE UP (ref 39–50)
HCT VFR BLD CALC: 46.1 % — SIGNIFICANT CHANGE UP (ref 39–50)
HGB BLD-MCNC: 15.1 G/DL — SIGNIFICANT CHANGE UP (ref 13–17)
HGB BLD-MCNC: 15.1 G/DL — SIGNIFICANT CHANGE UP (ref 13–17)
MCHC RBC-ENTMCNC: 30.1 PG — SIGNIFICANT CHANGE UP (ref 27–34)
MCHC RBC-ENTMCNC: 30.1 PG — SIGNIFICANT CHANGE UP (ref 27–34)
MCHC RBC-ENTMCNC: 32.8 GM/DL — SIGNIFICANT CHANGE UP (ref 32–36)
MCHC RBC-ENTMCNC: 32.8 GM/DL — SIGNIFICANT CHANGE UP (ref 32–36)
MCV RBC AUTO: 92 FL — SIGNIFICANT CHANGE UP (ref 80–100)
MCV RBC AUTO: 92 FL — SIGNIFICANT CHANGE UP (ref 80–100)
NT-PROBNP SERPL-SCNC: 573 PG/ML — HIGH (ref 0–450)
NT-PROBNP SERPL-SCNC: 573 PG/ML — HIGH (ref 0–450)
PLATELET # BLD AUTO: 286 K/UL — SIGNIFICANT CHANGE UP (ref 150–400)
PLATELET # BLD AUTO: 286 K/UL — SIGNIFICANT CHANGE UP (ref 150–400)
POTASSIUM SERPL-MCNC: 4.1 MMOL/L — SIGNIFICANT CHANGE UP (ref 3.5–5.3)
POTASSIUM SERPL-MCNC: 4.1 MMOL/L — SIGNIFICANT CHANGE UP (ref 3.5–5.3)
POTASSIUM SERPL-SCNC: 4.1 MMOL/L — SIGNIFICANT CHANGE UP (ref 3.5–5.3)
POTASSIUM SERPL-SCNC: 4.1 MMOL/L — SIGNIFICANT CHANGE UP (ref 3.5–5.3)
RBC # BLD: 5.01 M/UL — SIGNIFICANT CHANGE UP (ref 4.2–5.8)
RBC # BLD: 5.01 M/UL — SIGNIFICANT CHANGE UP (ref 4.2–5.8)
RBC # FLD: 13.3 % — SIGNIFICANT CHANGE UP (ref 10.3–14.5)
RBC # FLD: 13.3 % — SIGNIFICANT CHANGE UP (ref 10.3–14.5)
SODIUM SERPL-SCNC: 140 MMOL/L — SIGNIFICANT CHANGE UP (ref 135–145)
SODIUM SERPL-SCNC: 140 MMOL/L — SIGNIFICANT CHANGE UP (ref 135–145)
WBC # BLD: 10.56 K/UL — HIGH (ref 3.8–10.5)
WBC # BLD: 10.56 K/UL — HIGH (ref 3.8–10.5)
WBC # FLD AUTO: 10.56 K/UL — HIGH (ref 3.8–10.5)
WBC # FLD AUTO: 10.56 K/UL — HIGH (ref 3.8–10.5)

## 2023-10-21 PROCEDURE — 99233 SBSQ HOSP IP/OBS HIGH 50: CPT

## 2023-10-21 RX ORDER — TOPIRAMATE 25 MG
50 TABLET ORAL DAILY
Refills: 0 | Status: DISCONTINUED | OUTPATIENT
Start: 2023-10-22 | End: 2023-10-24

## 2023-10-21 RX ORDER — FUROSEMIDE 40 MG
40 TABLET ORAL DAILY
Refills: 0 | Status: DISCONTINUED | OUTPATIENT
Start: 2023-10-21 | End: 2023-10-22

## 2023-10-21 RX ADMIN — DONEPEZIL HYDROCHLORIDE 10 MILLIGRAM(S): 10 TABLET, FILM COATED ORAL at 21:01

## 2023-10-21 RX ADMIN — TAMSULOSIN HYDROCHLORIDE 0.4 MILLIGRAM(S): 0.4 CAPSULE ORAL at 21:01

## 2023-10-21 RX ADMIN — Medication 1 TABLET(S): at 09:47

## 2023-10-21 RX ADMIN — ENOXAPARIN SODIUM 40 MILLIGRAM(S): 100 INJECTION SUBCUTANEOUS at 13:47

## 2023-10-21 RX ADMIN — LATANOPROST 1 DROP(S): 0.05 SOLUTION/ DROPS OPHTHALMIC; TOPICAL at 21:01

## 2023-10-21 RX ADMIN — Medication 600 MILLIGRAM(S): at 09:50

## 2023-10-21 RX ADMIN — Medication 40 MILLIGRAM(S): at 13:47

## 2023-10-21 RX ADMIN — Medication 5 MILLIGRAM(S): at 09:50

## 2023-10-21 RX ADMIN — Medication 1 TABLET(S): at 21:01

## 2023-10-21 RX ADMIN — Medication 600 MILLIGRAM(S): at 21:01

## 2023-10-21 RX ADMIN — Medication 1 DROP(S): at 09:50

## 2023-10-21 RX ADMIN — PRIMIDONE 100 MILLIGRAM(S): 250 TABLET ORAL at 09:48

## 2023-10-21 RX ADMIN — Medication 50 MILLIGRAM(S): at 09:50

## 2023-10-21 RX ADMIN — TIOTROPIUM BROMIDE AND OLODATEROL 2 PUFF(S): 3.124; 2.736 SPRAY, METERED RESPIRATORY (INHALATION) at 11:26

## 2023-10-21 NOTE — PROVIDER CONTACT NOTE (OTHER) - DATE AND TIME:
12-Oct-2023 14:16
19-Oct-2023 16:00
12-Oct-2023 03:38
13-Oct-2023 08:43
21-Oct-2023 16:19
12-Oct-2023 02:05

## 2023-10-21 NOTE — PROGRESS NOTE ADULT - SUBJECTIVE AND OBJECTIVE BOX
Subjective:    pat better, family @ bedside, no respiratory distress.    Home Medications:  donepezil 10 mg oral tablet: 1 tab(s) orally once a day (at bedtime) (11 Oct 2023 18:51)  furosemide 40 mg oral tablet: 1 tab(s) orally once a day (11 Oct 2023 18:51)  guaiFENesin 600 mg oral tablet, extended release: 1 tab(s) orally once a day (in the evening) (11 Oct 2023 18:51)  Melatonin 10 mg oral capsule: 1 cap(s) orally once a day (at bedtime) (11 Oct 2023 18:51)  methenamine hippurate 1 g oral tablet: 1 tab(s) orally once a day (11 Oct 2023 18:51)  pravastatin 40 mg oral tablet: 1 tab(s) orally once a day (at bedtime) (11 Oct 2023 18:51)  predniSONE 5 mg oral tablet: 1 tab(s) orally once a day (11 Oct 2023 23:16)  primidone 50 mg oral tablet: 2 tab(s) orally 2 times a day (11 Oct 2023 18:51)  Stiolto Respimat 60 ACT 2.5 mcg-2.5 mcg/inh inhalation aerosol: 1 puff(s) inhaled once a day (11 Oct 2023 18:51)  tamsulosin 0.4 mg oral capsule: 1 cap(s) orally once a day (at bedtime) (11 Oct 2023 18:51)  Timoptic 0.5% ophthalmic solution: 1 drop(s) in each eye once a day (11 Oct 2023 18:51)  topiramate 50 mg oral tablet: 1 tab(s) orally 2 times a day (11 Oct 2023 18:51)  triamcinolone 0.1% topical cream: Apply topically to affected area once a day as needed for  itching (11 Oct 2023 23:16)  Tylenol 325 mg oral tablet: 2 tab(s) orally every 6 hours as needed for pain (11 Oct 2023 23:16)  Vitamin B-12: 1 tab(s) orally once a day (11 Oct 2023 18:51)  Xalatan 0.005% ophthalmic solution: 1 drop(s) in each eye once a day (at bedtime) (11 Oct 2023 18:50)    MEDICATIONS  (STANDING):  amoxicillin  500 milliGRAM(s)/clavulanate 1 Tablet(s) Oral two times a day  donepezil 10 milliGRAM(s) Oral at bedtime  enoxaparin Injectable 40 milliGRAM(s) SubCutaneous every 24 hours  furosemide    Tablet 40 milliGRAM(s) Oral daily  guaiFENesin  milliGRAM(s) Oral every 12 hours  latanoprost 0.005% Ophthalmic Solution 1 Drop(s) Both EYES at bedtime  predniSONE   Tablet 5 milliGRAM(s) Oral daily  primidone 100 milliGRAM(s) Oral two times a day  tamsulosin 0.4 milliGRAM(s) Oral at bedtime  timolol 0.5% Solution 1 Drop(s) Both EYES daily  tiotropium 2.5 MICROgram(s)/olodaterol 2.5 MICROgram(s) (STIOLTO) Inhaler 2 Puff(s) Inhalation daily  topiramate 50 milliGRAM(s) Oral two times a day    MEDICATIONS  (PRN):  acetaminophen     Tablet .. 650 milliGRAM(s) Oral every 6 hours PRN Temp greater or equal to 38C (100.4F), Mild Pain (1 - 3)  albuterol    90 MICROgram(s) HFA Inhaler 2 Puff(s) Inhalation every 4 hours PRN Shortness of Breath and/or Wheezing  aluminum hydroxide/magnesium hydroxide/simethicone Suspension 30 milliLiter(s) Oral every 4 hours PRN Dyspepsia  ondansetron Injectable 4 milliGRAM(s) IV Push every 8 hours PRN Nausea and/or Vomiting      Allergies    azithromycin (Rash)    Intolerances    lactose (Diarrhea)      Vital Signs Last 24 Hrs  T(C): 37.1 (21 Oct 2023 07:38), Max: 37.1 (21 Oct 2023 07:38)  T(F): 98.7 (21 Oct 2023 07:38), Max: 98.7 (21 Oct 2023 07:38)  HR: 80 (21 Oct 2023 07:38) (80 - 97)  BP: 118/57 (21 Oct 2023 07:38) (112/64 - 118/57)  BP(mean): --  RR: 18 (20 Oct 2023 22:08) (18 - 18)  SpO2: 96% (21 Oct 2023 07:38) (95% - 97%)    Parameters below as of 21 Oct 2023 07:38  Patient On (Oxygen Delivery Method): nasal cannula          PHYSICAL EXAMINATION:    NECK:  Supple. No lymphadenopathy. Jugular venous pressure not elevated. Carotids equal.   HEART:   The cardiac impulse has a normal quality. Reg., Nl S1 and S2.  There are no murmurs, rubs or gallops noted  CHEST:  Chest crackles to auscultation. Normal respiratory effort.  ABDOMEN:  Soft and nontender.   EXTREMITIES:  There is no edema.       LABS:                        15.1   10.56 )-----------( 286      ( 21 Oct 2023 08:11 )             46.1     10-21    140  |  110<H>  |  32<H>  ----------------------------<  115<H>  4.1   |  23  |  0.84    Ca    8.9      21 Oct 2023 08:11        Urinalysis Basic - ( 21 Oct 2023 08:11 )    Color: x / Appearance: x / SG: x / pH: x  Gluc: 115 mg/dL / Ketone: x  / Bili: x / Urobili: x   Blood: x / Protein: x / Nitrite: x   Leuk Esterase: x / RBC: x / WBC x   Sq Epi: x / Non Sq Epi: x / Bacteria: x

## 2023-10-21 NOTE — PROVIDER CONTACT NOTE (OTHER) - REASON
notified pcp
Neurology consult
Pt having Chest pain after tylenol
Pulmonary consult
Chest Pain
chest discomfort

## 2023-10-21 NOTE — PROGRESS NOTE ADULT - ASSESSMENT
PROBLEMS:    Pneumonia, suspected aspiration  Acute hypoxic resp failure due to pneumonia  COPD-stable  Hospital delirium    PLAN:    FiO2 support. Titrate off keep sats >92%  Po augmentin  Aspiration precautions  Supportive care  Repeat CXR  AEROSOLS  DVT PROPHYLASIX  Code status: Patient is DNR/DNI

## 2023-10-21 NOTE — PROVIDER CONTACT NOTE (OTHER) - SITUATION
Informed md office of consult
Informed Georgina at md office of consult
patient resides at [place]
patient c/o of chest discomfort and had audible wheezing

## 2023-10-21 NOTE — PROGRESS NOTE ADULT - SUBJECTIVE AND OBJECTIVE BOX
HPI:  87 y/o Male with PMHx of COPD, HLD, and dementia BIBEMS to the ED from [place] [facility] with complain of left-sided chest pain worse with inhalation, unknown time of onset.    S:  10/14/23: Patient seen and examined. No new issues.   10/15/23: Awake and alert today. Discussed with wife and daughter at bed side regarding management and d/c plan.   10/16/23: No new issues.   10/17/23: Patient seen and examined. Awake and alert, eating lunch on his own. Wife at bed side. Discussed with her regarding management and d/c plan.   10/18/23: Sitting in chair, no new issues. Discussed with wife at bed side regarding management and d/c plan.   10/19/23: Sleepy, still mildly hypoxic O2 sats on RA on rest 89-90%, congested.   10/20/23: pt sitting upright in bed, awake and alert. wife at bedside- agreeable to plan of care. reports pt seems improved today vs. yesterday, mentating better and more alert. on 2Lo2 via NC in no acute distress.   10/21: Pt weak, lethargic appearing, eyes closed mostly. Spoke to family at bedside at length regarding plan of care.  Improvement in pneumonia overall and will further evaluate prolonged lethargy.    ROS: Unable to obtain due to lethargy    Vital Signs Last 24 Hrs  T(C): 37.1 (21 Oct 2023 07:38), Max: 37.1 (21 Oct 2023 07:38)  T(F): 98.7 (21 Oct 2023 07:38), Max: 98.7 (21 Oct 2023 07:38)  HR: 80 (21 Oct 2023 07:38) (80 - 97)  BP: 118/57 (21 Oct 2023 07:38) (112/64 - 118/57)  BP(mean): --  RR: 18 (20 Oct 2023 22:08) (18 - 18)  SpO2: 96% (21 Oct 2023 07:38) (95% - 97%)    Parameters below as of 21 Oct 2023 07:38  Patient On (Oxygen Delivery Method): nasal cannula        Physical exam:     GENERAL: NAD, weak, lethargic appearing  HEAD:  Atraumatic, Normocephalic  EYES: EOMI, PERRLA, conjunctiva and sclera clear  HEENT: Moist mucous membranes  NECK: Supple, No JVD  NERVOUS SYSTEM:  Alert, confused  CHEST/LUNG: decreased breath sounds b/l  HEART: S1S2 normal, no murmur   ABDOMEN: Soft, Nontender, Nondistended; Bowel sounds present  GENITOURINARY: Voiding, no palpable bladder  EXTREMITIES:  2+ Peripheral Pulses, No clubbing, cyanosis, or edema  MUSCULOSKELETAL No muscle tenderness, Muscle tone normal, No joint tenderness, no Joint swelling, Joint range of motion-normal  SKIN-no rash, no lesion.      MEDICATIONS  (STANDING):  amoxicillin  500 milliGRAM(s)/clavulanate 1 Tablet(s) Oral two times a day  donepezil 10 milliGRAM(s) Oral at bedtime  enoxaparin Injectable 40 milliGRAM(s) SubCutaneous every 24 hours  furosemide    Tablet 40 milliGRAM(s) Oral daily  guaiFENesin  milliGRAM(s) Oral every 12 hours  latanoprost 0.005% Ophthalmic Solution 1 Drop(s) Both EYES at bedtime  predniSONE   Tablet 5 milliGRAM(s) Oral daily  primidone 100 milliGRAM(s) Oral two times a day  tamsulosin 0.4 milliGRAM(s) Oral at bedtime  timolol 0.5% Solution 1 Drop(s) Both EYES daily  tiotropium 2.5 MICROgram(s)/olodaterol 2.5 MICROgram(s) (STIOLTO) Inhaler 2 Puff(s) Inhalation daily  topiramate 50 milliGRAM(s) Oral two times a day    MEDICATIONS  (PRN):  acetaminophen     Tablet .. 650 milliGRAM(s) Oral every 6 hours PRN Temp greater or equal to 38C (100.4F), Mild Pain (1 - 3)  albuterol    90 MICROgram(s) HFA Inhaler 2 Puff(s) Inhalation every 4 hours PRN Shortness of Breath and/or Wheezing  aluminum hydroxide/magnesium hydroxide/simethicone Suspension 30 milliLiter(s) Oral every 4 hours PRN Dyspepsia  ondansetron Injectable 4 milliGRAM(s) IV Push every 8 hours PRN Nausea and/or Vomiting                                15.1   10.56 )-----------( 286      ( 21 Oct 2023 08:11 )             46.1     10-21    140  |  110<H>  |  32<H>  ----------------------------<  115<H>  4.1   |  23  |  0.84    Ca    8.9      21 Oct 2023 08:11      CAPILLARY BLOOD GLUCOSE            Urinalysis Basic - ( 21 Oct 2023 08:11 )    Color: x / Appearance: x / SG: x / pH: x  Gluc: 115 mg/dL / Ketone: x  / Bili: x / Urobili: x   Blood: x / Protein: x / Nitrite: x   Leuk Esterase: x / RBC: x / WBC x   Sq Epi: x / Non Sq Epi: x / Bacteria: x        Assessment and Plan:  88-year-old M with PMHx of COPD, HLD, and dementia BIBEMS to the ED from [place] [facility] with complain of left-sided chest pain worse with inhalation, unknown time of onset. Admitted for acute hypoxic respiratory failure 2/2 PNA, suspected aspiration     # toxic-Metabolic encephalopathy due to acute Hypoxic Respiratory Failure due to aspiration pneumonia, prolonged hospitalization and possible medication induced:  -CT: RLL PNA  - supplemental O2 support, maintain sats > 92%   - suspected gram neg rods   - s/p Zosyn #7 days, start Augmentin to complete 10 days total.   - rpt CXR 10/19: Patchy and streaky bibasilar parenchymal opacities are reidentified may represent fibroatelectatic changes.  component of pneumonia difficult to exclude radiographically.  - was on lasix at home, BNP elevated,  will give dose today and monitor.  - EEG  - neuro eval for adjustment of anticonvulsant meds contributing to lethargy    # COPD/Emphysema:   - d/c further steroids, no wheezing at this time.  - c/w inhalers   - pulm following  - stable, continue to monitor     # Essential tremor:  -on primidone  -on topiramate  -neuro eval for potential adjustment of meds    severe protein calorie malnutrition:  -diet liberalized to regular  -will add protein supplementation  -encouraged oral intake  -oral vitamins and protein shake supplementation recommended upon discharge    # DVT ppx  - enoxaparin     Code Status: pt is DNR/DNI     Dispo:   -d/c to [facility] once mentation improved           HPI:  89 y/o Male with PMHx of COPD, HLD, and dementia BIBEMS to the ED from [place] [facility] with complain of left-sided chest pain worse with inhalation, unknown time of onset.    S:  10/14/23: Patient seen and examined. No new issues.   10/15/23: Awake and alert today. Discussed with wife and daughter at bed side regarding management and d/c plan.   10/16/23: No new issues.   10/17/23: Patient seen and examined. Awake and alert, eating lunch on his own. Wife at bed side. Discussed with her regarding management and d/c plan.   10/18/23: Sitting in chair, no new issues. Discussed with wife at bed side regarding management and d/c plan.   10/19/23: Sleepy, still mildly hypoxic O2 sats on RA on rest 89-90%, congested.   10/20/23: pt sitting upright in bed, awake and alert. wife at bedside- agreeable to plan of care. reports pt seems improved today vs. yesterday, mentating better and more alert. on 2Lo2 via NC in no acute distress.   10/21: Pt weak, lethargic appearing, eyes closed mostly. Spoke to family at bedside at length regarding plan of care.  Improvement in pneumonia overall and will further evaluate prolonged lethargy.    ROS: Unable to obtain due to lethargy    Vital Signs Last 24 Hrs  T(C): 37.1 (21 Oct 2023 07:38), Max: 37.1 (21 Oct 2023 07:38)  T(F): 98.7 (21 Oct 2023 07:38), Max: 98.7 (21 Oct 2023 07:38)  HR: 80 (21 Oct 2023 07:38) (80 - 97)  BP: 118/57 (21 Oct 2023 07:38) (112/64 - 118/57)  BP(mean): --  RR: 18 (20 Oct 2023 22:08) (18 - 18)  SpO2: 96% (21 Oct 2023 07:38) (95% - 97%)    Parameters below as of 21 Oct 2023 07:38  Patient On (Oxygen Delivery Method): nasal cannula        Physical exam:     GENERAL: NAD, weak, lethargic appearing  HEAD:  Atraumatic, Normocephalic  EYES: EOMI, PERRLA, conjunctiva and sclera clear  HEENT: Moist mucous membranes  NECK: Supple, No JVD  NERVOUS SYSTEM:  Alert, confused  CHEST/LUNG: decreased breath sounds b/l  HEART: S1S2 normal, no murmur   ABDOMEN: Soft, Nontender, Nondistended; Bowel sounds present  GENITOURINARY: Voiding, no palpable bladder  EXTREMITIES:  2+ Peripheral Pulses, No clubbing, cyanosis, or edema  MUSCULOSKELETAL No muscle tenderness, Muscle tone normal, No joint tenderness, no Joint swelling, Joint range of motion-normal  SKIN-no rash, no lesion.      MEDICATIONS  (STANDING):  amoxicillin  500 milliGRAM(s)/clavulanate 1 Tablet(s) Oral two times a day  donepezil 10 milliGRAM(s) Oral at bedtime  enoxaparin Injectable 40 milliGRAM(s) SubCutaneous every 24 hours  furosemide    Tablet 40 milliGRAM(s) Oral daily  guaiFENesin  milliGRAM(s) Oral every 12 hours  latanoprost 0.005% Ophthalmic Solution 1 Drop(s) Both EYES at bedtime  predniSONE   Tablet 5 milliGRAM(s) Oral daily  primidone 100 milliGRAM(s) Oral two times a day  tamsulosin 0.4 milliGRAM(s) Oral at bedtime  timolol 0.5% Solution 1 Drop(s) Both EYES daily  tiotropium 2.5 MICROgram(s)/olodaterol 2.5 MICROgram(s) (STIOLTO) Inhaler 2 Puff(s) Inhalation daily  topiramate 50 milliGRAM(s) Oral two times a day    MEDICATIONS  (PRN):  acetaminophen     Tablet .. 650 milliGRAM(s) Oral every 6 hours PRN Temp greater or equal to 38C (100.4F), Mild Pain (1 - 3)  albuterol    90 MICROgram(s) HFA Inhaler 2 Puff(s) Inhalation every 4 hours PRN Shortness of Breath and/or Wheezing  aluminum hydroxide/magnesium hydroxide/simethicone Suspension 30 milliLiter(s) Oral every 4 hours PRN Dyspepsia  ondansetron Injectable 4 milliGRAM(s) IV Push every 8 hours PRN Nausea and/or Vomiting                                15.1   10.56 )-----------( 286      ( 21 Oct 2023 08:11 )             46.1     10-21    140  |  110<H>  |  32<H>  ----------------------------<  115<H>  4.1   |  23  |  0.84    Ca    8.9      21 Oct 2023 08:11      CAPILLARY BLOOD GLUCOSE            Urinalysis Basic - ( 21 Oct 2023 08:11 )    Color: x / Appearance: x / SG: x / pH: x  Gluc: 115 mg/dL / Ketone: x  / Bili: x / Urobili: x   Blood: x / Protein: x / Nitrite: x   Leuk Esterase: x / RBC: x / WBC x   Sq Epi: x / Non Sq Epi: x / Bacteria: x        Assessment and Plan:  88-year-old M with PMHx of COPD, HLD, and dementia BIBEMS to the ED from [place] [facility] with complain of left-sided chest pain worse with inhalation, unknown time of onset. Admitted for acute hypoxic respiratory failure 2/2 PNA, suspected aspiration     # toxic-Metabolic encephalopathy due to acute Hypoxic Respiratory Failure due to aspiration pneumonia, prolonged hospitalization and possible medication induced:  -CT: RLL PNA  - supplemental O2 support, maintain sats > 92%   - suspected gram neg rods   - s/p Zosyn #7 days, start Augmentin to complete 10 days total.   - rpt CXR 10/19: Patchy and streaky bibasilar parenchymal opacities are reidentified may represent fibroatelectatic changes.  component of pneumonia difficult to exclude radiographically.  - was on lasix at home, BNP elevated,  will give dose today and monitor.  - EEG  - neuro eval for adjustment of anticonvulsant meds contributing to lethargy, for now will stop primidone, get AM level, and adjust accordingly.  Will also reduce topamax BID to qd    # COPD/Emphysema:   - d/c further steroids, no wheezing at this time.  - c/w inhalers   - pulm following  - stable, continue to monitor     # Essential tremor:  -will stop primidone, get AM level, and adjust accordingly.  Will also reduce topamax BID to qd  -neuro eval for potential adjustment of meds    severe protein calorie malnutrition:  -diet liberalized to regular  -will add protein supplementation  -encouraged oral intake  -oral vitamins and protein shake supplementation recommended upon discharge    # DVT ppx  - enoxaparin     Code Status: pt is DNR/DNI     Dispo:   -d/c to [facility] once mentation improved

## 2023-10-22 LAB
ANION GAP SERPL CALC-SCNC: 6 MMOL/L — SIGNIFICANT CHANGE UP (ref 5–17)
ANION GAP SERPL CALC-SCNC: 6 MMOL/L — SIGNIFICANT CHANGE UP (ref 5–17)
BUN SERPL-MCNC: 34 MG/DL — HIGH (ref 7–23)
BUN SERPL-MCNC: 34 MG/DL — HIGH (ref 7–23)
CALCIUM SERPL-MCNC: 9 MG/DL — SIGNIFICANT CHANGE UP (ref 8.5–10.1)
CALCIUM SERPL-MCNC: 9 MG/DL — SIGNIFICANT CHANGE UP (ref 8.5–10.1)
CHLORIDE SERPL-SCNC: 112 MMOL/L — HIGH (ref 96–108)
CHLORIDE SERPL-SCNC: 112 MMOL/L — HIGH (ref 96–108)
CO2 SERPL-SCNC: 24 MMOL/L — SIGNIFICANT CHANGE UP (ref 22–31)
CO2 SERPL-SCNC: 24 MMOL/L — SIGNIFICANT CHANGE UP (ref 22–31)
CREAT SERPL-MCNC: 0.85 MG/DL — SIGNIFICANT CHANGE UP (ref 0.5–1.3)
CREAT SERPL-MCNC: 0.85 MG/DL — SIGNIFICANT CHANGE UP (ref 0.5–1.3)
EGFR: 84 ML/MIN/1.73M2 — SIGNIFICANT CHANGE UP
EGFR: 84 ML/MIN/1.73M2 — SIGNIFICANT CHANGE UP
GLUCOSE SERPL-MCNC: 92 MG/DL — SIGNIFICANT CHANGE UP (ref 70–99)
GLUCOSE SERPL-MCNC: 92 MG/DL — SIGNIFICANT CHANGE UP (ref 70–99)
HCT VFR BLD CALC: 45.4 % — SIGNIFICANT CHANGE UP (ref 39–50)
HCT VFR BLD CALC: 45.4 % — SIGNIFICANT CHANGE UP (ref 39–50)
HGB BLD-MCNC: 14.5 G/DL — SIGNIFICANT CHANGE UP (ref 13–17)
HGB BLD-MCNC: 14.5 G/DL — SIGNIFICANT CHANGE UP (ref 13–17)
MCHC RBC-ENTMCNC: 30 PG — SIGNIFICANT CHANGE UP (ref 27–34)
MCHC RBC-ENTMCNC: 30 PG — SIGNIFICANT CHANGE UP (ref 27–34)
MCHC RBC-ENTMCNC: 31.9 GM/DL — LOW (ref 32–36)
MCHC RBC-ENTMCNC: 31.9 GM/DL — LOW (ref 32–36)
MCV RBC AUTO: 93.8 FL — SIGNIFICANT CHANGE UP (ref 80–100)
MCV RBC AUTO: 93.8 FL — SIGNIFICANT CHANGE UP (ref 80–100)
NT-PROBNP SERPL-SCNC: 287 PG/ML — SIGNIFICANT CHANGE UP (ref 0–450)
NT-PROBNP SERPL-SCNC: 287 PG/ML — SIGNIFICANT CHANGE UP (ref 0–450)
PLATELET # BLD AUTO: 264 K/UL — SIGNIFICANT CHANGE UP (ref 150–400)
PLATELET # BLD AUTO: 264 K/UL — SIGNIFICANT CHANGE UP (ref 150–400)
POTASSIUM SERPL-MCNC: 4 MMOL/L — SIGNIFICANT CHANGE UP (ref 3.5–5.3)
POTASSIUM SERPL-MCNC: 4 MMOL/L — SIGNIFICANT CHANGE UP (ref 3.5–5.3)
POTASSIUM SERPL-SCNC: 4 MMOL/L — SIGNIFICANT CHANGE UP (ref 3.5–5.3)
POTASSIUM SERPL-SCNC: 4 MMOL/L — SIGNIFICANT CHANGE UP (ref 3.5–5.3)
RBC # BLD: 4.84 M/UL — SIGNIFICANT CHANGE UP (ref 4.2–5.8)
RBC # BLD: 4.84 M/UL — SIGNIFICANT CHANGE UP (ref 4.2–5.8)
RBC # FLD: 13.7 % — SIGNIFICANT CHANGE UP (ref 10.3–14.5)
RBC # FLD: 13.7 % — SIGNIFICANT CHANGE UP (ref 10.3–14.5)
SODIUM SERPL-SCNC: 142 MMOL/L — SIGNIFICANT CHANGE UP (ref 135–145)
SODIUM SERPL-SCNC: 142 MMOL/L — SIGNIFICANT CHANGE UP (ref 135–145)
WBC # BLD: 5.18 K/UL — SIGNIFICANT CHANGE UP (ref 3.8–10.5)
WBC # BLD: 5.18 K/UL — SIGNIFICANT CHANGE UP (ref 3.8–10.5)
WBC # FLD AUTO: 5.18 K/UL — SIGNIFICANT CHANGE UP (ref 3.8–10.5)
WBC # FLD AUTO: 5.18 K/UL — SIGNIFICANT CHANGE UP (ref 3.8–10.5)

## 2023-10-22 PROCEDURE — 99233 SBSQ HOSP IP/OBS HIGH 50: CPT

## 2023-10-22 RX ADMIN — Medication 50 MILLIGRAM(S): at 09:00

## 2023-10-22 RX ADMIN — Medication 40 MILLIGRAM(S): at 08:59

## 2023-10-22 RX ADMIN — Medication 1 DROP(S): at 09:00

## 2023-10-22 RX ADMIN — Medication 600 MILLIGRAM(S): at 20:42

## 2023-10-22 RX ADMIN — LATANOPROST 1 DROP(S): 0.05 SOLUTION/ DROPS OPHTHALMIC; TOPICAL at 20:43

## 2023-10-22 RX ADMIN — Medication 1 TABLET(S): at 20:43

## 2023-10-22 RX ADMIN — Medication 600 MILLIGRAM(S): at 08:59

## 2023-10-22 RX ADMIN — DONEPEZIL HYDROCHLORIDE 10 MILLIGRAM(S): 10 TABLET, FILM COATED ORAL at 20:42

## 2023-10-22 RX ADMIN — TAMSULOSIN HYDROCHLORIDE 0.4 MILLIGRAM(S): 0.4 CAPSULE ORAL at 20:43

## 2023-10-22 RX ADMIN — TIOTROPIUM BROMIDE AND OLODATEROL 2 PUFF(S): 3.124; 2.736 SPRAY, METERED RESPIRATORY (INHALATION) at 09:22

## 2023-10-22 RX ADMIN — Medication 1 TABLET(S): at 08:59

## 2023-10-22 RX ADMIN — ENOXAPARIN SODIUM 40 MILLIGRAM(S): 100 INJECTION SUBCUTANEOUS at 15:19

## 2023-10-22 NOTE — PROGRESS NOTE ADULT - ASSESSMENT
PROBLEMS:    Pneumonia, suspected aspiration  Acute hypoxic resp failure due to pneumonia  COPD-stable  Hospital delirium    PLAN:    pulmonary stable  Po augmentin  Aspiration precautions  Supportive care  Repeat CXR  AEROSOLS  DVT PROPHYLASIX  Code status: Patient is DNR/DNI

## 2023-10-22 NOTE — PROGRESS NOTE ADULT - SUBJECTIVE AND OBJECTIVE BOX
Subjective:    pat better, cough improving, no new complaint.    Home Medications:  donepezil 10 mg oral tablet: 1 tab(s) orally once a day (at bedtime) (11 Oct 2023 18:51)  furosemide 40 mg oral tablet: 1 tab(s) orally once a day (11 Oct 2023 18:51)  guaiFENesin 600 mg oral tablet, extended release: 1 tab(s) orally once a day (in the evening) (11 Oct 2023 18:51)  Melatonin 10 mg oral capsule: 1 cap(s) orally once a day (at bedtime) (11 Oct 2023 18:51)  methenamine hippurate 1 g oral tablet: 1 tab(s) orally once a day (11 Oct 2023 18:51)  pravastatin 40 mg oral tablet: 1 tab(s) orally once a day (at bedtime) (11 Oct 2023 18:51)  predniSONE 5 mg oral tablet: 1 tab(s) orally once a day (11 Oct 2023 23:16)  primidone 50 mg oral tablet: 2 tab(s) orally 2 times a day (11 Oct 2023 18:51)  Stiolto Respimat 60 ACT 2.5 mcg-2.5 mcg/inh inhalation aerosol: 1 puff(s) inhaled once a day (11 Oct 2023 18:51)  tamsulosin 0.4 mg oral capsule: 1 cap(s) orally once a day (at bedtime) (11 Oct 2023 18:51)  Timoptic 0.5% ophthalmic solution: 1 drop(s) in each eye once a day (11 Oct 2023 18:51)  topiramate 50 mg oral tablet: 1 tab(s) orally 2 times a day (11 Oct 2023 18:51)  triamcinolone 0.1% topical cream: Apply topically to affected area once a day as needed for  itching (11 Oct 2023 23:16)  Tylenol 325 mg oral tablet: 2 tab(s) orally every 6 hours as needed for pain (11 Oct 2023 23:16)  Vitamin B-12: 1 tab(s) orally once a day (11 Oct 2023 18:51)  Xalatan 0.005% ophthalmic solution: 1 drop(s) in each eye once a day (at bedtime) (11 Oct 2023 18:50)    MEDICATIONS  (STANDING):  amoxicillin  500 milliGRAM(s)/clavulanate 1 Tablet(s) Oral two times a day  donepezil 10 milliGRAM(s) Oral at bedtime  enoxaparin Injectable 40 milliGRAM(s) SubCutaneous every 24 hours  guaiFENesin  milliGRAM(s) Oral every 12 hours  latanoprost 0.005% Ophthalmic Solution 1 Drop(s) Both EYES at bedtime  tamsulosin 0.4 milliGRAM(s) Oral at bedtime  timolol 0.5% Solution 1 Drop(s) Both EYES daily  tiotropium 2.5 MICROgram(s)/olodaterol 2.5 MICROgram(s) (STIOLTO) Inhaler 2 Puff(s) Inhalation daily  topiramate 50 milliGRAM(s) Oral daily    MEDICATIONS  (PRN):  acetaminophen     Tablet .. 650 milliGRAM(s) Oral every 6 hours PRN Temp greater or equal to 38C (100.4F), Mild Pain (1 - 3)  albuterol    90 MICROgram(s) HFA Inhaler 2 Puff(s) Inhalation every 4 hours PRN Shortness of Breath and/or Wheezing  aluminum hydroxide/magnesium hydroxide/simethicone Suspension 30 milliLiter(s) Oral every 4 hours PRN Dyspepsia  ondansetron Injectable 4 milliGRAM(s) IV Push every 8 hours PRN Nausea and/or Vomiting      Allergies    azithromycin (Rash)    Intolerances    lactose (Diarrhea)      Vital Signs Last 24 Hrs  T(C): 36.5 (22 Oct 2023 10:40), Max: 37 (21 Oct 2023 15:24)  T(F): 97.7 (22 Oct 2023 10:40), Max: 98.6 (21 Oct 2023 15:24)  HR: 73 (22 Oct 2023 10:40) (71 - 79)  BP: 132/62 (22 Oct 2023 10:40) (115/50 - 132/62)  BP(mean): --  RR: 18 (22 Oct 2023 10:40) (18 - 19)  SpO2: 96% (22 Oct 2023 10:40) (95% - 96%)    Parameters below as of 22 Oct 2023 10:40  Patient On (Oxygen Delivery Method): nasal cannula  O2 Flow (L/min): 2        PHYSICAL EXAMINATION:    NECK:  Supple. No lymphadenopathy. Jugular venous pressure not elevated. Carotids equal.   HEART:   The cardiac impulse has a normal quality. Reg., Nl S1 and S2.  There are no murmurs, rubs or gallops noted  CHEST:  Chest rhonchi to auscultation. Normal respiratory effort.  ABDOMEN:  Soft and nontender.   EXTREMITIES:  There is no edema.       LABS:                        14.5   5.18  )-----------( 264      ( 22 Oct 2023 08:09 )             45.4     10-22    142  |  112<H>  |  34<H>  ----------------------------<  92  4.0   |  24  |  0.85    Ca    9.0      22 Oct 2023 08:09        Urinalysis Basic - ( 22 Oct 2023 08:09 )    Color: x / Appearance: x / SG: x / pH: x  Gluc: 92 mg/dL / Ketone: x  / Bili: x / Urobili: x   Blood: x / Protein: x / Nitrite: x   Leuk Esterase: x / RBC: x / WBC x   Sq Epi: x / Non Sq Epi: x / Bacteria: x

## 2023-10-22 NOTE — PROGRESS NOTE ADULT - SUBJECTIVE AND OBJECTIVE BOX
CC: weakness, lethargy    HPI:  89 y/o Male with PMHx of COPD, HLD, and dementia BIBEMS to the ED from [place] [facility] with complain of left-sided chest pain worse with inhalation, unknown time of onset.    S:  10/14/23: Patient seen and examined. No new issues.   10/15/23: Awake and alert today. Discussed with wife and daughter at bed side regarding management and d/c plan.   10/16/23: No new issues.   10/17/23: Patient seen and examined. Awake and alert, eating lunch on his own. Wife at bed side. Discussed with her regarding management and d/c plan.   10/18/23: Sitting in chair, no new issues. Discussed with wife at bed side regarding management and d/c plan.   10/19/23: Sleepy, still mildly hypoxic O2 sats on RA on rest 89-90%, congested.   10/20/23: pt sitting upright in bed, awake and alert. wife at bedside- agreeable to plan of care. reports pt seems improved today vs. yesterday, mentating better and more alert. on 2Lo2 via NC in no acute distress.   10/21: Pt weak, lethargic appearing, eyes closed mostly. Spoke to family at bedside at length regarding plan of care.  Improvement in pneumonia overall and will further evaluate prolonged lethargy.  10/22: Pt still remains weak, lethargic appearing and overall difficult to arouse.  Discussed this with wife, and also updated on plan of care.      ROS: Unable to obtain due to lethargy    Vital Signs Last 24 Hrs  T(C): 36.5 (22 Oct 2023 10:40), Max: 37 (21 Oct 2023 15:24)  T(F): 97.7 (22 Oct 2023 10:40), Max: 98.6 (21 Oct 2023 15:24)  HR: 73 (22 Oct 2023 10:40) (71 - 79)  BP: 132/62 (22 Oct 2023 10:40) (115/50 - 132/62)  BP(mean): --  RR: 18 (22 Oct 2023 10:40) (18 - 19)  SpO2: 96% (22 Oct 2023 10:40) (95% - 96%)    Parameters below as of 22 Oct 2023 10:40  Patient On (Oxygen Delivery Method): nasal cannula  O2 Flow (L/min): 2      Physical exam:     GENERAL: NAD, weak, lethargic appearing  HEAD:  Atraumatic, Normocephalic  EYES: EOMI, PERRLA, conjunctiva and sclera clear  HEENT: Moist mucous membranes  NECK: Supple, No JVD  NERVOUS SYSTEM:  Alert, confused  CHEST/LUNG: decreased breath sounds b/l  HEART: S1S2 normal, no murmur   ABDOMEN: Soft, Nontender, Nondistended; Bowel sounds present  GENITOURINARY: Voiding, no palpable bladder  EXTREMITIES:  2+ Peripheral Pulses, No clubbing, cyanosis, or edema  MUSCULOSKELETAL No muscle tenderness, Muscle tone normal, No joint tenderness, no Joint swelling, Joint range of motion-normal  SKIN-no rash, no lesion.      MEDICATIONS  (STANDING):  amoxicillin  500 milliGRAM(s)/clavulanate 1 Tablet(s) Oral two times a day  donepezil 10 milliGRAM(s) Oral at bedtime  enoxaparin Injectable 40 milliGRAM(s) SubCutaneous every 24 hours  guaiFENesin  milliGRAM(s) Oral every 12 hours  latanoprost 0.005% Ophthalmic Solution 1 Drop(s) Both EYES at bedtime  tamsulosin 0.4 milliGRAM(s) Oral at bedtime  timolol 0.5% Solution 1 Drop(s) Both EYES daily  tiotropium 2.5 MICROgram(s)/olodaterol 2.5 MICROgram(s) (STIOLTO) Inhaler 2 Puff(s) Inhalation daily  topiramate 50 milliGRAM(s) Oral daily    MEDICATIONS  (PRN):  acetaminophen     Tablet .. 650 milliGRAM(s) Oral every 6 hours PRN Temp greater or equal to 38C (100.4F), Mild Pain (1 - 3)  albuterol    90 MICROgram(s) HFA Inhaler 2 Puff(s) Inhalation every 4 hours PRN Shortness of Breath and/or Wheezing  aluminum hydroxide/magnesium hydroxide/simethicone Suspension 30 milliLiter(s) Oral every 4 hours PRN Dyspepsia  ondansetron Injectable 4 milliGRAM(s) IV Push every 8 hours PRN Nausea and/or Vomiting                              14.5   5.18  )-----------( 264      ( 22 Oct 2023 08:09 )             45.4     10-22    142  |  112<H>  |  34<H>  ----------------------------<  92  4.0   |  24  |  0.85    Ca    9.0      22 Oct 2023 08:09      CAPILLARY BLOOD GLUCOSE      POCT Blood Glucose.: 115 mg/dL (21 Oct 2023 16:34)        Urinalysis Basic - ( 22 Oct 2023 08:09 )    Color: x / Appearance: x / SG: x / pH: x  Gluc: 92 mg/dL / Ketone: x  / Bili: x / Urobili: x   Blood: x / Protein: x / Nitrite: x   Leuk Esterase: x / RBC: x / WBC x   Sq Epi: x / Non Sq Epi: x / Bacteria: x          Assessment and Plan:  88-year-old M with PMHx of COPD, HLD, and dementia BIBEMS to the ED from [place] [facility] with complain of left-sided chest pain worse with inhalation, unknown time of onset. Admitted for acute hypoxic respiratory failure 2/2 PNA, suspected aspiration     # toxic-Metabolic encephalopathy due to acute Hypoxic Respiratory Failure due to aspiration pneumonia, prolonged hospitalization and possible medication induced:  -CT: RLL PNA  - supplemental O2 support, maintain sats > 92%   - suspected gram neg rods   - s/p Zosyn #7 days, start Augmentin to complete 10 days total. Will d/c after today.   - rpt CXR 10/19: Patchy and streaky bibasilar parenchymal opacities are reidentified may represent fibroatelectatic changes.  component of pneumonia difficult to exclude radiographically.  - was on lasix at home, BNP elevated, s/p 2 oral doses 40mg (10/21, 10/22) - hold further.   - EEG pending  - stopped primodine due to lethargy, awaiting levels  - reduced topamax BID to qd due to lethargy  - neuro eval for adjustment of anticonvulsant meds     # COPD/Emphysema:   - off steroids  - c/w inhalers   - pulm following  - stable, continue to monitor     # Essential tremor:  - stopped primodine due to lethargy, awaiting levels  - reduced topamax BID to qd due to lethargy  - neuro eval for adjustment of anticonvulsant meds     severe protein calorie malnutrition:  -diet liberalized to regular  -will add protein supplementation  -encouraged oral intake  -oral vitamins and protein shake supplementation recommended upon discharge    # DVT ppx  - enoxaparin     Code Status: pt is DNR/DNI     Dispo:   -d/c to [facility] once mentation improved  -wife updated on plan of care 10/22

## 2023-10-23 PROCEDURE — 99233 SBSQ HOSP IP/OBS HIGH 50: CPT

## 2023-10-23 PROCEDURE — 99222 1ST HOSP IP/OBS MODERATE 55: CPT

## 2023-10-23 PROCEDURE — 95816 EEG AWAKE AND DROWSY: CPT | Mod: 26

## 2023-10-23 RX ORDER — PRIMIDONE 250 MG/1
25 TABLET ORAL
Refills: 0 | Status: DISCONTINUED | OUTPATIENT
Start: 2023-10-23 | End: 2023-10-24

## 2023-10-23 RX ADMIN — Medication 600 MILLIGRAM(S): at 22:19

## 2023-10-23 RX ADMIN — PRIMIDONE 25 MILLIGRAM(S): 250 TABLET ORAL at 18:18

## 2023-10-23 RX ADMIN — Medication 1 TABLET(S): at 10:05

## 2023-10-23 RX ADMIN — TAMSULOSIN HYDROCHLORIDE 0.4 MILLIGRAM(S): 0.4 CAPSULE ORAL at 22:20

## 2023-10-23 RX ADMIN — ENOXAPARIN SODIUM 40 MILLIGRAM(S): 100 INJECTION SUBCUTANEOUS at 15:02

## 2023-10-23 RX ADMIN — TIOTROPIUM BROMIDE AND OLODATEROL 2 PUFF(S): 3.124; 2.736 SPRAY, METERED RESPIRATORY (INHALATION) at 07:45

## 2023-10-23 RX ADMIN — Medication 50 MILLIGRAM(S): at 10:03

## 2023-10-23 RX ADMIN — Medication 600 MILLIGRAM(S): at 10:05

## 2023-10-23 RX ADMIN — DONEPEZIL HYDROCHLORIDE 10 MILLIGRAM(S): 10 TABLET, FILM COATED ORAL at 22:20

## 2023-10-23 RX ADMIN — Medication 1 DROP(S): at 10:11

## 2023-10-23 RX ADMIN — Medication 650 MILLIGRAM(S): at 23:04

## 2023-10-23 RX ADMIN — LATANOPROST 1 DROP(S): 0.05 SOLUTION/ DROPS OPHTHALMIC; TOPICAL at 22:20

## 2023-10-23 RX ADMIN — Medication 650 MILLIGRAM(S): at 22:19

## 2023-10-23 NOTE — CONSULT NOTE ADULT - ASSESSMENT
88 year old man with COPD, essential tremor, HLD, dementia, admitted with aspiration PNA, noted to be excessively lethargic several days during this admission, with concern it is due to medication effect.  On exam, appears fatigued, has mild essential tremors, does not appear to be greatly limiting, no other neurological findings.  No acute findings on imaging.    Would reinstate his primidone at a lower dose to avoid withdrawal seizures.    Can gradually titrate up as tolerated.    -primidone 25mg twice daily (ordered).  If tolerating can increase to 50mg BID after 3 days if still having tremors  -can be further increased in outpatient setting as necessary  -please page or call neurology for any acute neuro changes, or other issues we can  help address.

## 2023-10-23 NOTE — CONSULT NOTE ADULT - SUBJECTIVE AND OBJECTIVE BOX
88 year old man with HLD, COPD, dementia, essential tremor on primidone, admitted 10/11 from [facility] to  on for L sided chest pain, worse with inhalation, found to have PNA.  During this admission, treated with IV antibiotics, reportedly improving.  Noted on multiple occasions to be very lethargic, concern for medication effect of primidone and topiramate.    Primidone discontinued on 10/21, home dose was 100mg BID, was continued initially during this admission.    On evaluation, patient denies having any neurological complaints either than  88 year old man with HLD, COPD, dementia, essential tremor on primidone, admitted 10/11 from [facility] living to  on for L sided chest pain, worse with inhalation, found to have PNA.  During this admission, treated with IV antibiotics, reportedly improving.  Noted on multiple occasions to be very lethargic, concern for medication effect of primidone and topiramate.    Primidone discontinued on 10/21, home dose was 100mg BID, was continued initially during this admission.    On evaluation, patient denies having any neurological complaints other than fatigue.  He denies having headaches, visual changes, disorientation, weakness in the arms or legs.  he does report he has tremors at baseline, he takes primidone, and it has a very modest effect on the tremor.  The tremor does not inhibit him from eating or drinking.      Medically, he reports having shortness of breath.  Denies any more chest pain.  No abdominal pain.  No swelling in the extremities.      On exam:   GEN; NAD  CV: RRR, S1, S2  PULM CTAB  GI: soft, non tender  HEENT: no nuchal rigidity, no temporal tenderness  LYMPH: no cervical lymphadenopathy.   NEURO:   Awake, alert, oriented to hospital, month, year, normal naming, repetition.  Appears fatigued.    Pupils 3-2mm, symmetric, full VF's, normal EOM, no nystagmus, no facial weakness, no dysarthria, voice is weak and hypophonic.   MOTOR: normal bulk and tone.  no drift. 5/5 throughout to confrontation.   SENSORY: intact symmetrically to light touch, no extinction.   COORDINATION: normal fNF    CT images (10/11) reviewed: no hemorrhage.  Diffuse midl cortical atrophy.

## 2023-10-23 NOTE — PROGRESS NOTE ADULT - SUBJECTIVE AND OBJECTIVE BOX
Subjective:    pat lying in bed, sleepy but arousable, no respiratory distress.    Home Medications:  donepezil 10 mg oral tablet: 1 tab(s) orally once a day (at bedtime) (11 Oct 2023 18:51)  furosemide 40 mg oral tablet: 1 tab(s) orally once a day (11 Oct 2023 18:51)  guaiFENesin 600 mg oral tablet, extended release: 1 tab(s) orally once a day (in the evening) (11 Oct 2023 18:51)  Melatonin 10 mg oral capsule: 1 cap(s) orally once a day (at bedtime) (11 Oct 2023 18:51)  methenamine hippurate 1 g oral tablet: 1 tab(s) orally once a day (11 Oct 2023 18:51)  pravastatin 40 mg oral tablet: 1 tab(s) orally once a day (at bedtime) (11 Oct 2023 18:51)  predniSONE 5 mg oral tablet: 1 tab(s) orally once a day (11 Oct 2023 23:16)  primidone 50 mg oral tablet: 2 tab(s) orally 2 times a day (11 Oct 2023 18:51)  Stiolto Respimat 60 ACT 2.5 mcg-2.5 mcg/inh inhalation aerosol: 1 puff(s) inhaled once a day (11 Oct 2023 18:51)  tamsulosin 0.4 mg oral capsule: 1 cap(s) orally once a day (at bedtime) (11 Oct 2023 18:51)  Timoptic 0.5% ophthalmic solution: 1 drop(s) in each eye once a day (11 Oct 2023 18:51)  topiramate 50 mg oral tablet: 1 tab(s) orally 2 times a day (11 Oct 2023 18:51)  triamcinolone 0.1% topical cream: Apply topically to affected area once a day as needed for  itching (11 Oct 2023 23:16)  Tylenol 325 mg oral tablet: 2 tab(s) orally every 6 hours as needed for pain (11 Oct 2023 23:16)  Vitamin B-12: 1 tab(s) orally once a day (11 Oct 2023 18:51)  Xalatan 0.005% ophthalmic solution: 1 drop(s) in each eye once a day (at bedtime) (11 Oct 2023 18:50)    MEDICATIONS  (STANDING):  donepezil 10 milliGRAM(s) Oral at bedtime  enoxaparin Injectable 40 milliGRAM(s) SubCutaneous every 24 hours  guaiFENesin  milliGRAM(s) Oral every 12 hours  latanoprost 0.005% Ophthalmic Solution 1 Drop(s) Both EYES at bedtime  tamsulosin 0.4 milliGRAM(s) Oral at bedtime  timolol 0.5% Solution 1 Drop(s) Both EYES daily  tiotropium 2.5 MICROgram(s)/olodaterol 2.5 MICROgram(s) (STIOLTO) Inhaler 2 Puff(s) Inhalation daily  topiramate 50 milliGRAM(s) Oral daily    MEDICATIONS  (PRN):  acetaminophen     Tablet .. 650 milliGRAM(s) Oral every 6 hours PRN Temp greater or equal to 38C (100.4F), Mild Pain (1 - 3)  albuterol    90 MICROgram(s) HFA Inhaler 2 Puff(s) Inhalation every 4 hours PRN Shortness of Breath and/or Wheezing  aluminum hydroxide/magnesium hydroxide/simethicone Suspension 30 milliLiter(s) Oral every 4 hours PRN Dyspepsia  ondansetron Injectable 4 milliGRAM(s) IV Push every 8 hours PRN Nausea and/or Vomiting      Allergies    azithromycin (Rash)    Intolerances    lactose (Diarrhea)      Vital Signs Last 24 Hrs  T(C): 36.2 (23 Oct 2023 08:39), Max: 37 (22 Oct 2023 15:43)  T(F): 97.2 (23 Oct 2023 08:39), Max: 98.6 (22 Oct 2023 15:43)  HR: 72 (23 Oct 2023 08:39) (72 - 90)  BP: 122/71 (23 Oct 2023 08:39) (117/65 - 133/89)  BP(mean): --  RR: 18 (23 Oct 2023 00:17) (18 - 19)  SpO2: 96% (23 Oct 2023 08:39) (94% - 97%)    Parameters below as of 23 Oct 2023 08:39  Patient On (Oxygen Delivery Method): nasal cannula          PHYSICAL EXAMINATION:    NECK:  Supple. No lymphadenopathy. Jugular venous pressure not elevated. Carotids equal.   HEART:   The cardiac impulse has a normal quality. Reg., Nl S1 and S2.  There are no murmurs, rubs or gallops noted  CHEST:  Chest crackles to auscultation. Normal respiratory effort.  ABDOMEN:  Soft and nontender.   EXTREMITIES:  There is no edema.       LABS:                        14.5   5.18  )-----------( 264      ( 22 Oct 2023 08:09 )             45.4     10-22    142  |  112<H>  |  34<H>  ----------------------------<  92  4.0   |  24  |  0.85    Ca    9.0      22 Oct 2023 08:09        Urinalysis Basic - ( 22 Oct 2023 08:09 )    Color: x / Appearance: x / SG: x / pH: x  Gluc: 92 mg/dL / Ketone: x  / Bili: x / Urobili: x   Blood: x / Protein: x / Nitrite: x   Leuk Esterase: x / RBC: x / WBC x   Sq Epi: x / Non Sq Epi: x / Bacteria: x

## 2023-10-23 NOTE — PROGRESS NOTE ADULT - SUBJECTIVE AND OBJECTIVE BOX
CC: weakness, lethargy    HPI:  87 y/o Male with PMHx of COPD, HLD, and dementia BIBEMS to the ED from [place] [facility] with complain of left-sided chest pain worse with inhalation, unknown time of onset.    S:  10/14/23: Patient seen and examined. No new issues.   10/15/23: Awake and alert today. Discussed with wife and daughter at bed side regarding management and d/c plan.   10/16/23: No new issues.   10/17/23: Patient seen and examined. Awake and alert, eating lunch on his own. Wife at bed side. Discussed with her regarding management and d/c plan.   10/18/23: Sitting in chair, no new issues. Discussed with wife at bed side regarding management and d/c plan.   10/19/23: Sleepy, still mildly hypoxic O2 sats on RA on rest 89-90%, congested.   10/20/23: pt sitting upright in bed, awake and alert. wife at bedside- agreeable to plan of care. reports pt seems improved today vs. yesterday, mentating better and more alert. on 2Lo2 via NC in no acute distress.   10/21: Pt weak, lethargic appearing, eyes closed mostly. Spoke to family at bedside at length regarding plan of care.  Improvement in pneumonia overall and will further evaluate prolonged lethargy.  10/22: Pt still remains weak, lethargic appearing and overall difficult to arouse.  Discussed this with wife, and also updated on plan of care.  10/23: More alert today, still overall weak but answering basic questions.  Pt comfortable.  Updated son on plan of care.    ROS: Unable to obtain due to lethargy    Vital Signs Last 24 Hrs  T(C): 36.2 (23 Oct 2023 08:39), Max: 37 (22 Oct 2023 15:43)  T(F): 97.2 (23 Oct 2023 08:39), Max: 98.6 (22 Oct 2023 15:43)  HR: 72 (23 Oct 2023 08:39) (72 - 90)  BP: 122/71 (23 Oct 2023 08:39) (117/65 - 133/89)  BP(mean): --  RR: 18 (23 Oct 2023 00:17) (18 - 19)  SpO2: 96% (23 Oct 2023 08:39) (94% - 97%)    Parameters below as of 23 Oct 2023 08:39  Patient On (Oxygen Delivery Method): nasal cannula        Physical exam:     GENERAL: NAD, weak appearing  HEAD:  Atraumatic, Normocephalic  EYES: EOMI, PERRLA, conjunctiva and sclera clear  HEENT: Moist mucous membranes  NECK: Supple, No JVD  NERVOUS SYSTEM:  Alert, confused  CHEST/LUNG: decreased breath sounds b/l  HEART: S1S2 normal, no murmur   ABDOMEN: Soft, Nontender, Nondistended; Bowel sounds present  GENITOURINARY: Voiding, no palpable bladder  EXTREMITIES:  2+ Peripheral Pulses, No clubbing, cyanosis, or edema  MUSCULOSKELETAL No muscle tenderness, Muscle tone normal, No joint tenderness, no Joint swelling, Joint range of motion-normal  SKIN-no rash, no lesion.      MEDICATIONS  (STANDING):  donepezil 10 milliGRAM(s) Oral at bedtime  enoxaparin Injectable 40 milliGRAM(s) SubCutaneous every 24 hours  guaiFENesin  milliGRAM(s) Oral every 12 hours  latanoprost 0.005% Ophthalmic Solution 1 Drop(s) Both EYES at bedtime  tamsulosin 0.4 milliGRAM(s) Oral at bedtime  timolol 0.5% Solution 1 Drop(s) Both EYES daily  tiotropium 2.5 MICROgram(s)/olodaterol 2.5 MICROgram(s) (STIOLTO) Inhaler 2 Puff(s) Inhalation daily  topiramate 50 milliGRAM(s) Oral daily    MEDICATIONS  (PRN):  acetaminophen     Tablet .. 650 milliGRAM(s) Oral every 6 hours PRN Temp greater or equal to 38C (100.4F), Mild Pain (1 - 3)  albuterol    90 MICROgram(s) HFA Inhaler 2 Puff(s) Inhalation every 4 hours PRN Shortness of Breath and/or Wheezing  aluminum hydroxide/magnesium hydroxide/simethicone Suspension 30 milliLiter(s) Oral every 4 hours PRN Dyspepsia  ondansetron Injectable 4 milliGRAM(s) IV Push every 8 hours PRN Nausea and/or Vomiting                                14.5   5.18  )-----------( 264      ( 22 Oct 2023 08:09 )             45.4     10-22    142  |  112<H>  |  34<H>  ----------------------------<  92  4.0   |  24  |  0.85    Ca    9.0      22 Oct 2023 08:09      CAPILLARY BLOOD GLUCOSE            Urinalysis Basic - ( 22 Oct 2023 08:09 )    Color: x / Appearance: x / SG: x / pH: x  Gluc: 92 mg/dL / Ketone: x  / Bili: x / Urobili: x   Blood: x / Protein: x / Nitrite: x   Leuk Esterase: x / RBC: x / WBC x   Sq Epi: x / Non Sq Epi: x / Bacteria: x        Assessment and Plan:  88-year-old M with PMHx of COPD, HLD, and dementia BIBEMS to the ED from [place] [facility] with complain of left-sided chest pain worse with inhalation, unknown time of onset. Admitted for acute hypoxic respiratory failure 2/2 PNA, suspected aspiration     # toxic-Metabolic encephalopathy due to acute Hypoxic Respiratory Failure due to aspiration pneumonia, prolonged hospitalization and possible medication induced:  -CT: RLL PNA  - supplemental O2 support, maintain sats > 92%   - suspected gram neg rods   - s/p Zosyn #7 days, start Augmentin to complete 10 days total. last day today.  - rpt CXR 10/19: Patchy and streaky bibasilar parenchymal opacities are reidentified may represent fibroatelectatic changes.  component of pneumonia difficult to exclude radiographically.  - was on lasix at home, BNP elevated, s/p 2 oral doses 40mg (10/21, 10/22) - hold further.   - EEG pending  - stopped primodine due to lethargy, awaiting levels  - reduced topamax BID to qd due to lethargy  - neuro eval for adjustment of anticonvulsant meds   - wean off supplemental O2    # COPD/Emphysema:   - off steroids  - c/w inhalers   - pulm following  - stable, continue to monitor     # Essential tremor:  - stopped primodine due to lethargy, awaiting levels  - reduced topamax BID to qd due to lethargy  - neuro eval for adjustment of anticonvulsant meds     severe protein calorie malnutrition:  -diet liberalized to regular  -will add protein supplementation  -encouraged oral intake  -oral vitamins and protein shake supplementation recommended upon discharge    # DVT ppx  - enoxaparin     Code Status: pt is DNR/DNI     Dispo:   -d/c to [facility] likely Tues v Wednesday  -family updated on plan of care 10/22, 10/23

## 2023-10-23 NOTE — PROGRESS NOTE ADULT - ASSESSMENT
PROBLEMS:    Pneumonia, suspected aspiration  Acute hypoxic resp failure due to pneumonia  COPD-stable  Hospital delirium    PLAN:    pulmonary stable-rehab palcement  Off abx  Aspiration precautions  Supportive care  AEROSOLS  DVT PROPHYLASIX  Code status: Patient is DNR/DNI

## 2023-10-24 ENCOUNTER — TRANSCRIPTION ENCOUNTER (OUTPATIENT)
Age: 88
End: 2023-10-24

## 2023-10-24 VITALS
OXYGEN SATURATION: 96 % | TEMPERATURE: 98 F | SYSTOLIC BLOOD PRESSURE: 113 MMHG | DIASTOLIC BLOOD PRESSURE: 54 MMHG | RESPIRATION RATE: 18 BRPM | HEART RATE: 60 BPM

## 2023-10-24 LAB
PHENOBARB SERPL-MCNC: 11.6 MG/L — LOW (ref 15–40)
PHENOBARB SERPL-MCNC: 11.6 MG/L — LOW (ref 15–40)
PRIMIDONE FLD-MCNC: 2.8 MG/L — LOW (ref 5–12)
PRIMIDONE FLD-MCNC: 2.8 MG/L — LOW (ref 5–12)

## 2023-10-24 PROCEDURE — 99239 HOSP IP/OBS DSCHRG MGMT >30: CPT

## 2023-10-24 RX ORDER — LATANOPROST 0.05 MG/ML
1 SOLUTION/ DROPS OPHTHALMIC; TOPICAL
Refills: 0 | DISCHARGE

## 2023-10-24 RX ORDER — LATANOPROST 0.05 MG/ML
1 SOLUTION/ DROPS OPHTHALMIC; TOPICAL
Qty: 0 | Refills: 0 | DISCHARGE
Start: 2023-10-24

## 2023-10-24 RX ORDER — TIMOLOL 0.5 %
1 DROPS OPHTHALMIC (EYE)
Qty: 0 | Refills: 0 | DISCHARGE
Start: 2023-10-24

## 2023-10-24 RX ORDER — TOPIRAMATE 25 MG
1 TABLET ORAL
Qty: 0 | Refills: 0 | DISCHARGE
Start: 2023-10-24

## 2023-10-24 RX ORDER — PRIMIDONE 250 MG/1
0.5 TABLET ORAL
Qty: 15 | Refills: 0
Start: 2023-10-24 | End: 2023-11-22

## 2023-10-24 RX ORDER — FUROSEMIDE 40 MG
1 TABLET ORAL
Qty: 0 | Refills: 0 | DISCHARGE

## 2023-10-24 RX ORDER — TIMOLOL 0.5 %
1 DROPS OPHTHALMIC (EYE)
Refills: 0 | DISCHARGE

## 2023-10-24 RX ADMIN — PRIMIDONE 25 MILLIGRAM(S): 250 TABLET ORAL at 06:14

## 2023-10-24 RX ADMIN — Medication 600 MILLIGRAM(S): at 09:14

## 2023-10-24 RX ADMIN — Medication 1 DROP(S): at 09:15

## 2023-10-24 RX ADMIN — ENOXAPARIN SODIUM 40 MILLIGRAM(S): 100 INJECTION SUBCUTANEOUS at 13:22

## 2023-10-24 RX ADMIN — TIOTROPIUM BROMIDE AND OLODATEROL 2 PUFF(S): 3.124; 2.736 SPRAY, METERED RESPIRATORY (INHALATION) at 08:08

## 2023-10-24 RX ADMIN — Medication 50 MILLIGRAM(S): at 09:14

## 2023-10-24 NOTE — DISCHARGE NOTE PROVIDER - [id number]_GEN_ALL_CORE
[FreeTextEntry1] : 78M with upper back sebaceous cyst presented to [facility] for excision today. Had presented to clinic for excision two weeks ago but no discrete cyst was present on exam. Has not had fever, drainage, pain. Only complains of itching at upper back.
This document is complete and the patient is ready for discharge.

## 2023-10-24 NOTE — DISCHARGE NOTE PROVIDER - NSDCMRMEDTOKEN_GEN_ALL_CORE_FT
donepezil 10 mg oral tablet: 1 tab(s) orally once a day (at bedtime)  guaiFENesin 600 mg oral tablet, extended release: 1 tab(s) orally once a day (in the evening)  latanoprost 0.005% ophthalmic solution: 1 drop(s) to each affected eye once a day (at bedtime)  Melatonin 10 mg oral capsule: 1 cap(s) orally once a day (at bedtime)  methenamine hippurate 1 g oral tablet: 1 tab(s) orally once a day  pravastatin 40 mg oral tablet: 1 tab(s) orally once a day (at bedtime)  primidone 50 mg oral tablet: 0.5 tab(s) orally once a day  Stiolto Respimat 60 ACT 2.5 mcg-2.5 mcg/inh inhalation aerosol: 1 puff(s) inhaled once a day  tamsulosin 0.4 mg oral capsule: 1 cap(s) orally once a day (at bedtime)  timolol maleate 0.5% ophthalmic solution: 1 drop(s) to each affected eye once a day  topiramate 50 mg oral tablet: 1 tab(s) orally once a day  Tylenol 325 mg oral tablet: 2 tab(s) orally every 6 hours as needed for pain  Vitamin B-12: 1 tab(s) orally once a day

## 2023-10-24 NOTE — DISCHARGE NOTE PROVIDER - NSDCCPCAREPLAN_GEN_ALL_CORE_FT
PRINCIPAL DISCHARGE DIAGNOSIS  Diagnosis: Pneumonia  Assessment and Plan of Treatment: RESOLVED  continue meds for COPD.  Weaned off prednisone.      SECONDARY DISCHARGE DIAGNOSES  Diagnosis: Toxic metabolic encephalopathy  Assessment and Plan of Treatment: mentation better.    Continue essential tremor meds at reduced dosage (See med rec) and adjust accordingly as out patient.

## 2023-10-24 NOTE — PROGRESS NOTE ADULT - SUBJECTIVE AND OBJECTIVE BOX
Subjective:    pat better, still sleepy, but arousabe to verbral command.    Home Medications:  donepezil 10 mg oral tablet: 1 tab(s) orally once a day (at bedtime) (11 Oct 2023 18:51)  furosemide 40 mg oral tablet: 1 tab(s) orally once a day (11 Oct 2023 18:51)  guaiFENesin 600 mg oral tablet, extended release: 1 tab(s) orally once a day (in the evening) (11 Oct 2023 18:51)  Melatonin 10 mg oral capsule: 1 cap(s) orally once a day (at bedtime) (11 Oct 2023 18:51)  methenamine hippurate 1 g oral tablet: 1 tab(s) orally once a day (11 Oct 2023 18:51)  pravastatin 40 mg oral tablet: 1 tab(s) orally once a day (at bedtime) (11 Oct 2023 18:51)  predniSONE 5 mg oral tablet: 1 tab(s) orally once a day (11 Oct 2023 23:16)  primidone 50 mg oral tablet: 2 tab(s) orally 2 times a day (11 Oct 2023 18:51)  Stiolto Respimat 60 ACT 2.5 mcg-2.5 mcg/inh inhalation aerosol: 1 puff(s) inhaled once a day (11 Oct 2023 18:51)  tamsulosin 0.4 mg oral capsule: 1 cap(s) orally once a day (at bedtime) (11 Oct 2023 18:51)  Timoptic 0.5% ophthalmic solution: 1 drop(s) in each eye once a day (11 Oct 2023 18:51)  topiramate 50 mg oral tablet: 1 tab(s) orally 2 times a day (11 Oct 2023 18:51)  triamcinolone 0.1% topical cream: Apply topically to affected area once a day as needed for  itching (11 Oct 2023 23:16)  Tylenol 325 mg oral tablet: 2 tab(s) orally every 6 hours as needed for pain (11 Oct 2023 23:16)  Vitamin B-12: 1 tab(s) orally once a day (11 Oct 2023 18:51)  Xalatan 0.005% ophthalmic solution: 1 drop(s) in each eye once a day (at bedtime) (11 Oct 2023 18:50)    MEDICATIONS  (STANDING):  donepezil 10 milliGRAM(s) Oral at bedtime  enoxaparin Injectable 40 milliGRAM(s) SubCutaneous every 24 hours  guaiFENesin  milliGRAM(s) Oral every 12 hours  latanoprost 0.005% Ophthalmic Solution 1 Drop(s) Both EYES at bedtime  primidone 25 milliGRAM(s) Oral two times a day  tamsulosin 0.4 milliGRAM(s) Oral at bedtime  timolol 0.5% Solution 1 Drop(s) Both EYES daily  tiotropium 2.5 MICROgram(s)/olodaterol 2.5 MICROgram(s) (STIOLTO) Inhaler 2 Puff(s) Inhalation daily  topiramate 50 milliGRAM(s) Oral daily    MEDICATIONS  (PRN):  acetaminophen     Tablet .. 650 milliGRAM(s) Oral every 6 hours PRN Temp greater or equal to 38C (100.4F), Mild Pain (1 - 3)  albuterol    90 MICROgram(s) HFA Inhaler 2 Puff(s) Inhalation every 4 hours PRN Shortness of Breath and/or Wheezing  aluminum hydroxide/magnesium hydroxide/simethicone Suspension 30 milliLiter(s) Oral every 4 hours PRN Dyspepsia  ondansetron Injectable 4 milliGRAM(s) IV Push every 8 hours PRN Nausea and/or Vomiting      Allergies    azithromycin (Rash)    Intolerances    lactose (Diarrhea)      Vital Signs Last 24 Hrs  T(C): 36.2 (24 Oct 2023 08:14), Max: 36.9 (23 Oct 2023 15:41)  T(F): 97.1 (24 Oct 2023 08:14), Max: 98.5 (23 Oct 2023 15:41)  HR: 57 (24 Oct 2023 08:14) (57 - 66)  BP: 129/55 (24 Oct 2023 08:14) (101/62 - 129/55)  BP(mean): --  RR: 18 (24 Oct 2023 08:14) (17 - 18)  SpO2: 98% (24 Oct 2023 08:14) (95% - 98%)    Parameters below as of 24 Oct 2023 08:14  Patient On (Oxygen Delivery Method): room air          PHYSICAL EXAMINATION:    NECK:  Supple. No lymphadenopathy. Jugular venous pressure not elevated. Carotids equal.   HEART:   The cardiac impulse has a normal quality. Reg., Nl S1 and S2.  There are no murmurs, rubs or gallops noted  CHEST:  Chest crackles to auscultation. Normal respiratory effort.  ABDOMEN:  Soft and nontender.   EXTREMITIES:  There is no edema.       LABS:

## 2023-10-24 NOTE — DISCHARGE NOTE PROVIDER - HOSPITAL COURSE
CC: weakness, lethargy    HPI:  89 y/o Male with PMHx of COPD, HLD, and dementia BIBEMS to the ED from [place] with complain of left-sided chest pain worse with inhalation, unknown time of onset.    Hospital course:  Pt admitted and found to have toxic-Metabolic encephalopathy due to acute Hypoxic Respiratory Failure due to aspiration pneumonia, prolonged hospitalization and possible medication induced.  He was treated with IV zosyn, transitioned to oral augmentin x 10 days for RLL PNA.  Pt slowly weaned off O2.  His essential tremor meds were adjusted/lowered per guidance of neurology.  EEG neg for seizures.  Pt more alert but overall weak and will need discharge to [facility].  Wife and family updated on plan of care.      REVIEW OF SYSTEMS: All other review of systems is negative unless indicated above.    Vital Signs Last 24 Hrs  T(C): 36.2 (24 Oct 2023 08:14), Max: 36.9 (23 Oct 2023 15:41)  T(F): 97.1 (24 Oct 2023 08:14), Max: 98.5 (23 Oct 2023 15:41)  HR: 57 (24 Oct 2023 08:14) (57 - 66)  BP: 129/55 (24 Oct 2023 08:14) (101/62 - 129/55)  BP(mean): --  RR: 18 (24 Oct 2023 08:14) (17 - 18)  SpO2: 98% (24 Oct 2023 08:14) (95% - 98%)    Parameters below as of 24 Oct 2023 08:14  Patient On (Oxygen Delivery Method): room air    Physical exam:     GENERAL: NAD, weak appearing  HEAD:  Atraumatic, Normocephalic  EYES: EOMI, PERRLA, conjunctiva and sclera clear  HEENT: Moist mucous membranes  NECK: Supple, No JVD  NERVOUS SYSTEM:  Alert, confused  CHEST/LUNG: decreased breath sounds b/l  HEART: S1S2 normal, no murmur   ABDOMEN: Soft, Nontender, Nondistended; Bowel sounds present  GENITOURINARY: Voiding, no palpable bladder  EXTREMITIES:  2+ Peripheral Pulses, No clubbing, cyanosis, or edema  MUSCULOSKELETAL No muscle tenderness, Muscle tone normal, No joint tenderness, no Joint swelling, Joint range of motion-normal  SKIN-no rash, no lesion.      med/labs: Reviewed and interpreted     Assessment and Plan:  88-year-old M with PMHx of COPD, HLD, and dementia BIBEMS to the ED from [place] with complain of left-sided chest pain worse with inhalation, unknown time of onset. Admitted for acute hypoxic respiratory failure 2/2 PNA, suspected aspiration     # toxic-Metabolic encephalopathy due to acute Hypoxic Respiratory Failure due to aspiration pneumonia, prolonged hospitalization and possible medication induced:  -CT: RLL PNA  - supplemental O2 support, maintain sats > 92%   - suspected gram neg rods   - completed 10 days of zosyn with transition to oral augmentin.   - was on lasix at home, BNP elevated, s/p 2 oral doses 40mg (10/21, 10/22) - hold further.   - EEG no seizure activity   - stopped primodine due to lethargy, restarted at 25mg per neurology recommendations  - reduced topamax BID to qd due to lethargy      # COPD/Emphysema: STABLE  - off steroids  - c/w inhalers    # Essential tremor:  - stopped primodine due to lethargy, restarted at 25mg per neurology recommendations  - reduced topamax BID to qd due to lethargy    severe protein calorie malnutrition:  -diet liberalized to regular  -will add protein supplementation  -encouraged oral intake  -oral vitamins and protein shake supplementation recommended upon discharge    # DVT ppx  - enoxaparin     Code Status: pt is DNR/DNI     Dispo:   -d/c to [facility].  Family/wife updated on plan of care.    Attending Statement: 40 minutes spent on total encounter and discharge planning.

## 2023-10-24 NOTE — PROGRESS NOTE ADULT - REASON FOR ADMISSION
Pneumonia

## 2023-10-24 NOTE — PROGRESS NOTE ADULT - PROVIDER SPECIALTY LIST ADULT
Hospitalist
Infectious Disease
Hospitalist
Pulmonology
Pulmonology
Hospitalist
Pulmonology

## 2023-10-30 DIAGNOSIS — J44.9 CHRONIC OBSTRUCTIVE PULMONARY DISEASE, UNSPECIFIED: ICD-10-CM

## 2023-10-30 DIAGNOSIS — E78.5 HYPERLIPIDEMIA, UNSPECIFIED: ICD-10-CM

## 2023-10-30 DIAGNOSIS — R05.3 CHRONIC COUGH: ICD-10-CM

## 2023-10-30 DIAGNOSIS — Z66 DO NOT RESUSCITATE: ICD-10-CM

## 2023-10-30 DIAGNOSIS — G30.9 ALZHEIMER'S DISEASE, UNSPECIFIED: ICD-10-CM

## 2023-10-30 DIAGNOSIS — F02.80 DEMENTIA IN OTHER DISEASES CLASSIFIED ELSEWHERE, UNSPECIFIED SEVERITY, WITHOUT BEHAVIORAL DISTURBANCE, PSYCHOTIC DISTURBANCE, MOOD DISTURBANCE, AND ANXIETY: ICD-10-CM

## 2023-10-30 DIAGNOSIS — E43 UNSPECIFIED SEVERE PROTEIN-CALORIE MALNUTRITION: ICD-10-CM

## 2023-10-30 DIAGNOSIS — J69.0 PNEUMONITIS DUE TO INHALATION OF FOOD AND VOMIT: ICD-10-CM

## 2023-10-30 DIAGNOSIS — J96.01 ACUTE RESPIRATORY FAILURE WITH HYPOXIA: ICD-10-CM

## 2023-10-30 DIAGNOSIS — M81.0 AGE-RELATED OSTEOPOROSIS WITHOUT CURRENT PATHOLOGICAL FRACTURE: ICD-10-CM

## 2023-10-30 DIAGNOSIS — G92.9 UNSPECIFIED TOXIC ENCEPHALOPATHY: ICD-10-CM

## 2023-11-01 ENCOUNTER — TRANSCRIPTION ENCOUNTER (OUTPATIENT)
Age: 88
End: 2023-11-01

## 2023-11-15 ENCOUNTER — TRANSCRIPTION ENCOUNTER (OUTPATIENT)
Age: 88
End: 2023-11-15

## 2024-07-03 NOTE — PATIENT PROFILE ADULT - LEGAL HELP
Subjective  History of Present Illness:    30-year-old obese female presenting today for evaluation of lumbar back pain.  Patient reports that this is a chronic problem for which she has been seen multiple times.  She has had CT scans in the past which have indicated degenerative disc disease.  She started having pain approximately a week ago with numbness and tingling extending down her left leg.  She denies saddle anesthesia, bladder or bowel incontinence.      Nurses Notes reviewed and agree, including vitals, allergies, social history and prior medical history.     REVIEW OF SYSTEMS: All systems reviewed and not pertinent unless noted.  Review of Systems   Constitutional:  Negative for chills, fatigue and fever.   HENT:  Negative for ear pain, rhinorrhea, sinus pain and sore throat.    Respiratory:  Negative for cough and shortness of breath.    Cardiovascular:  Negative for chest pain and palpitations.   Gastrointestinal:  Negative for diarrhea, nausea and vomiting.   Musculoskeletal:  Positive for back pain.   Neurological:  Positive for numbness. Negative for syncope, weakness, light-headedness and headaches.   All other systems reviewed and are negative.      No past medical history on file.    Allergies:    Patient has no known allergies.      Past Surgical History:   Procedure Laterality Date    TONSILLECTOMY AND ADENOIDECTOMY           Social History     Socioeconomic History    Marital status: Single   Tobacco Use    Smoking status: Passive Smoke Exposure - Never Smoker    Smokeless tobacco: Never   Substance and Sexual Activity    Alcohol use: Yes     Comment: 3 times a week    Drug use: Never    Sexual activity: Yes     Partners: Male     Birth control/protection: Condom         Family History   Problem Relation Age of Onset    Arthritis Mother     Hypertension Mother     Diabetes Mother     Heart disease Mother     Cancer Father     Thyroid disease Father        Objective  Physical Exam:  /89  "(BP Location: Left arm, Patient Position: Sitting)   Pulse 102   Temp 98.4 °F (36.9 °C) (Oral)   Resp 17   Ht 167.6 cm (66\")   Wt 136 kg (300 lb)   SpO2 95%   BMI 48.42 kg/m²      Physical Exam  Vitals and nursing note reviewed.   Constitutional:       Appearance: Normal appearance. She is normal weight.   HENT:      Head: Normocephalic and atraumatic.      Right Ear: Tympanic membrane, ear canal and external ear normal.      Left Ear: Tympanic membrane, ear canal and external ear normal.      Nose: Nose normal.      Mouth/Throat:      Mouth: Mucous membranes are moist.      Pharynx: Oropharynx is clear.   Eyes:      Extraocular Movements: Extraocular movements intact.      Conjunctiva/sclera: Conjunctivae normal.      Pupils: Pupils are equal, round, and reactive to light.   Cardiovascular:      Rate and Rhythm: Normal rate and regular rhythm.      Pulses: Normal pulses.      Heart sounds: Normal heart sounds.   Pulmonary:      Effort: Pulmonary effort is normal.      Breath sounds: Normal breath sounds.   Abdominal:      General: Abdomen is flat. Bowel sounds are normal.      Palpations: Abdomen is soft.   Musculoskeletal:         General: Normal range of motion.      Cervical back: Normal range of motion and neck supple.      Lumbar back: Positive left straight leg raise test.   Skin:     General: Skin is warm.      Capillary Refill: Capillary refill takes less than 2 seconds.   Neurological:      General: No focal deficit present.      Mental Status: She is alert and oriented to person, place, and time. Mental status is at baseline.   Psychiatric:         Mood and Affect: Mood normal.         Behavior: Behavior normal.         Thought Content: Thought content normal.         Judgment: Judgment normal.         Procedures    ED Course:         Lab Results (last 24 hours)       ** No results found for the last 24 hours. **             No radiology results from the last 24 hrs       MDM      Initial " impression of presenting illness: 30-year-old female presenting today for evaluation of lower back pain radiating to her left leg    DDX: includes but is not limited to: Sciatica, lumbago, degenerative disc disease    Patient arrives ambulatory, afebrile, normotensive with heart rate 100 with vitals interpreted by myself.     Pertinent features from physical exam: Patient is morbidly obese with positive straight leg raise.    Diagnostic information from other sources: Previous emergency room visits, office visits    Interventions / Re-evaluation: Patient given Toradol, Decadron and discharged home with follow-up    Medications   dexAMETHasone (DECADRON) injection 10 mg (has no administration in time range)   ketorolac (TORADOL) injection 60 mg (has no administration in time range)       Results/clinical rationale were discussed with patient    Consultations/Discussion of results with other physicians: None    Data interpreted: Nursing notes reviewed, vital signs reviewed.   O2 saturation: 95% room air    Counseling: Discussed the results above with the patient regarding need for admission or discharge.  Patient understands and agrees plan of care.      -----  ED Disposition       ED Disposition   Discharge    Condition   Stable    Comment   --             Final diagnoses:   Acute left-sided low back pain with left-sided sciatica      Your Follow-Up Providers       PATIENT CONNECTION - [facility]. Schedule an appointment as soon as possible for a visit in 1 week.    Follow up details: If symptoms worsen, For further evaluation  [place] [address]  [phone]                     Contact information for after-discharge care    Follow-up information has not been specified.                    Your medication list        START taking these medications        Instructions Last Dose Given Next Dose Due   cyclobenzaprine 10 MG tablet  Commonly known as: FLEXERIL      Take 1 tablet by mouth 3 (Three) Times a Day As  Needed for Muscle Spasms.       diclofenac 50 MG EC tablet  Commonly known as: VOLTAREN      Take 1 tablet by mouth 3 (Three) Times a Day.       predniSONE 20 MG tablet  Commonly known as: DELTASONE      Take 3 tablets by mouth Daily for 5 days.              CONTINUE taking these medications        Instructions Last Dose Given Next Dose Due   gabapentin 600 MG tablet  Commonly known as: NEURONTIN      Take 1 tablet by mouth 3 (Three) Times a Day.       guaifenesin 100 MG/5ML liquid  Commonly known as: ROBITUSSIN      Take 10 mL by mouth Every 4 (Four) Hours As Needed for Cough.       metoclopramide 10 MG tablet  Commonly known as: REGLAN      Take 1 tablet by mouth 4 (Four) Times a Day Before Meals & at Bedtime.       pantoprazole 40 MG EC tablet  Commonly known as: PROTONIX      Take 1 tablet by mouth Every Morning Before Breakfast.       traZODone 50 MG tablet  Commonly known as: DESYREL      Take 1 tablet by mouth Every Night.                 Where to Get Your Medications        These medications were sent to [facility] [id number] - [place], [place] - [facility] [address] AT [facility] [address] - [phone]  - [phone]   [address], [facility] [address]      Phone: [phone]   cyclobenzaprine 10 MG tablet  diclofenac 50 MG EC tablet  predniSONE 20 MG tablet          no
